# Patient Record
Sex: FEMALE | Race: BLACK OR AFRICAN AMERICAN | NOT HISPANIC OR LATINO | Employment: OTHER | URBAN - METROPOLITAN AREA
[De-identification: names, ages, dates, MRNs, and addresses within clinical notes are randomized per-mention and may not be internally consistent; named-entity substitution may affect disease eponyms.]

---

## 2023-05-17 ENCOUNTER — HOSPITAL ENCOUNTER (OUTPATIENT)
Dept: DATA CONVERSION | Facility: HOSPITAL | Age: 53
End: 2023-05-17
Attending: HOSPITALIST | Admitting: HOSPITALIST
Payer: COMMERCIAL

## 2023-05-17 DIAGNOSIS — Z99.2 DEPENDENCE ON RENAL DIALYSIS (CMS-HCC): ICD-10-CM

## 2023-05-17 DIAGNOSIS — I12.0 HYPERTENSIVE CHRONIC KIDNEY DISEASE WITH STAGE 5 CHRONIC KIDNEY DISEASE OR END STAGE RENAL DISEASE (MULTI): ICD-10-CM

## 2023-05-17 DIAGNOSIS — N18.6 END STAGE RENAL DISEASE (MULTI): ICD-10-CM

## 2023-05-17 DIAGNOSIS — I25.2 OLD MYOCARDIAL INFARCTION: ICD-10-CM

## 2023-05-17 DIAGNOSIS — Z01.810 ENCOUNTER FOR PREPROCEDURAL CARDIOVASCULAR EXAMINATION: ICD-10-CM

## 2023-05-17 DIAGNOSIS — E11.22 TYPE 2 DIABETES MELLITUS WITH DIABETIC CHRONIC KIDNEY DISEASE (MULTI): ICD-10-CM

## 2023-05-17 DIAGNOSIS — I25.10 ATHEROSCLEROTIC HEART DISEASE OF NATIVE CORONARY ARTERY WITHOUT ANGINA PECTORIS: ICD-10-CM

## 2023-05-17 DIAGNOSIS — Z95.5 PRESENCE OF CORONARY ANGIOPLASTY IMPLANT AND GRAFT: ICD-10-CM

## 2023-05-17 DIAGNOSIS — Z01.818 ENCOUNTER FOR OTHER PREPROCEDURAL EXAMINATION: ICD-10-CM

## 2023-05-17 DIAGNOSIS — Z94.0 KIDNEY TRANSPLANT STATUS (HHS-HCC): ICD-10-CM

## 2023-05-17 DIAGNOSIS — E78.5 HYPERLIPIDEMIA, UNSPECIFIED: ICD-10-CM

## 2023-05-17 DIAGNOSIS — Z86.718 PERSONAL HISTORY OF OTHER VENOUS THROMBOSIS AND EMBOLISM: ICD-10-CM

## 2023-05-17 LAB
ANION GAP IN SER/PLAS: 16 MMOL/L (ref 10–20)
CALCIUM (MG/DL) IN SER/PLAS: 10.4 MG/DL (ref 8.6–10.3)
CARBON DIOXIDE, TOTAL (MMOL/L) IN SER/PLAS: 29 MMOL/L (ref 21–32)
CHLORIDE (MMOL/L) IN SER/PLAS: 89 MMOL/L (ref 98–107)
CREATININE (MG/DL) IN SER/PLAS: 10.1 MG/DL (ref 0.5–1.05)
ERYTHROCYTE DISTRIBUTION WIDTH (RATIO) BY AUTOMATED COUNT: 14.4 % (ref 11.5–14.5)
ERYTHROCYTE MEAN CORPUSCULAR HEMOGLOBIN CONCENTRATION (G/DL) BY AUTOMATED: 35.2 G/DL (ref 32–36)
ERYTHROCYTE MEAN CORPUSCULAR VOLUME (FL) BY AUTOMATED COUNT: 88 FL (ref 80–100)
ERYTHROCYTES (10*6/UL) IN BLOOD BY AUTOMATED COUNT: 4.01 X10E12/L (ref 4–5.2)
GFR FEMALE: 4 ML/MIN/1.73M2
GLUCOSE (MG/DL) IN SER/PLAS: 181 MG/DL (ref 74–99)
HEMATOCRIT (%) IN BLOOD BY AUTOMATED COUNT: 35.2 % (ref 36–46)
HEMOGLOBIN (G/DL) IN BLOOD: 12.4 G/DL (ref 12–16)
LEUKOCYTES (10*3/UL) IN BLOOD BY AUTOMATED COUNT: 9.7 X10E9/L (ref 4.4–11.3)
NRBC (PER 100 WBCS) BY AUTOMATED COUNT: 0.2 /100 WBC (ref 0–0)
PLATELETS (10*3/UL) IN BLOOD AUTOMATED COUNT: 214 X10E9/L (ref 150–450)
POTASSIUM (MMOL/L) IN SER/PLAS: 4.3 MMOL/L (ref 3.5–5.3)
SODIUM (MMOL/L) IN SER/PLAS: 130 MMOL/L (ref 136–145)
UREA NITROGEN (MG/DL) IN SER/PLAS: 37 MG/DL (ref 6–23)

## 2023-06-01 LAB
FLOW AUTOCROSSMATCH: NORMAL
HLA CLASS I ANTIBODY SCREEN, FLOW CYTOMETRY: NORMAL

## 2023-10-02 DIAGNOSIS — E78.5 HYPERLIPIDEMIA, UNSPECIFIED HYPERLIPIDEMIA TYPE: ICD-10-CM

## 2023-10-02 RX ORDER — ATORVASTATIN CALCIUM 40 MG/1
40 TABLET, FILM COATED ORAL NIGHTLY
COMMUNITY
Start: 2023-06-26 | End: 2023-10-03 | Stop reason: ENTERED-IN-ERROR

## 2023-10-03 RX ORDER — ATORVASTATIN CALCIUM 40 MG/1
40 TABLET, FILM COATED ORAL NIGHTLY
Qty: 90 TABLET | Refills: 0 | Status: SHIPPED | OUTPATIENT
Start: 2023-10-03 | End: 2024-02-16

## 2023-10-12 PROCEDURE — 86808 CYTOTOXIC ANTIBODY SCREENING: CPT | Mod: OUT | Performed by: SURGERY

## 2023-10-26 ENCOUNTER — HOSPITAL ENCOUNTER (EMERGENCY)
Facility: HOSPITAL | Age: 53
Discharge: HOME | End: 2023-10-26
Attending: EMERGENCY MEDICINE
Payer: COMMERCIAL

## 2023-10-26 VITALS
HEIGHT: 67 IN | DIASTOLIC BLOOD PRESSURE: 73 MMHG | WEIGHT: 187.39 LBS | OXYGEN SATURATION: 96 % | BODY MASS INDEX: 29.41 KG/M2 | SYSTOLIC BLOOD PRESSURE: 126 MMHG | HEART RATE: 87 BPM | RESPIRATION RATE: 18 BRPM | TEMPERATURE: 97 F

## 2023-10-26 DIAGNOSIS — L02.91 ABSCESS: Primary | ICD-10-CM

## 2023-10-26 PROCEDURE — 99283 EMERGENCY DEPT VISIT LOW MDM: CPT | Mod: 25 | Performed by: EMERGENCY MEDICINE

## 2023-10-26 PROCEDURE — 76882 US LMTD JT/FCL EVL NVASC XTR: CPT | Performed by: EMERGENCY MEDICINE

## 2023-10-26 RX ORDER — DOXYCYCLINE 100 MG/1
100 TABLET ORAL 2 TIMES DAILY
Qty: 14 TABLET | Refills: 0 | Status: SHIPPED | OUTPATIENT
Start: 2023-10-26 | End: 2023-11-02

## 2023-10-26 ASSESSMENT — COLUMBIA-SUICIDE SEVERITY RATING SCALE - C-SSRS
6. HAVE YOU EVER DONE ANYTHING, STARTED TO DO ANYTHING, OR PREPARED TO DO ANYTHING TO END YOUR LIFE?: NO
2. HAVE YOU ACTUALLY HAD ANY THOUGHTS OF KILLING YOURSELF?: NO
1. IN THE PAST MONTH, HAVE YOU WISHED YOU WERE DEAD OR WISHED YOU COULD GO TO SLEEP AND NOT WAKE UP?: NO

## 2023-10-26 NOTE — ED PROVIDER NOTES
HPI   Chief Complaint   Patient presents with    Abscess     Abscess to left side of face/chin since Friday morning that has become increasingly more swollen.       HPI    HISTORY OF PRESENT ILLNESS:  52 y.o. female presenting to the ED with complaint of an abscess on the left cheek.  Has been present for about 1 week.  Has been increasing in size.  She tried to get appointment with her dermatologist, but was unable to schedule one until November.  She states it is mildly tender, but has increased in size, which prompted her ED visit today.  She denies any bleeding or drainage.  She denies any intraoral extension, and is unable to feel the abscess from the inside of the mouth.  No pain in the upper cheek, underside of the jaw, within the mouth, or the neck.  She has had no fevers or chills.  No neck pain or stiffness.  No dizziness or lightheadedness.  No headaches.  No blurry vision.  No other complaints     PMH: DM2, ESRD on HD  Family history: noncontributory  Social history: non smoker, no ETOH, no illicit substances                  No data recorded                Patient History   Past Medical History:   Diagnosis Date    Allergic purpura (CMS/HCC)     HSP (Henoch-Schonlein purpura) nephritis    Benign neoplasm of connective and other soft tissue, unspecified     Fibroid tumor    Compression of vein 11/16/2022    SVC syndrome    End stage renal disease (CMS/HCC)     ESRD (end stage renal disease)    Other conditions influencing health status     Acute Myocardial Infarction    Streptococcal meningitis     Streptococcal meningitis     Past Surgical History:   Procedure Laterality Date    HYSTEROSCOPY  05/15/2013    Hysteroscopy With Endometrial Ablation    OTHER SURGICAL HISTORY  05/15/2013    Renal Transplant    OTHER SURGICAL HISTORY  05/15/2013    Parathyroid Resection Sub-Total Parathyroidectomy    OTHER SURGICAL HISTORY  05/15/2013    Parathyroid Surgery    OTHER SURGICAL HISTORY  05/15/2013    Repair Of  Brow Ptosis Right Upper Eyelid    OTHER SURGICAL HISTORY  05/15/2013    Cardiac Cath Lesion 1, 1st Adjunct Treat Device: Stent    OTHER SURGICAL HISTORY  05/15/2013    Biopsy Renal    OTHER SURGICAL HISTORY  10/25/2018    Hemodialysis Access Type Arteriovenous Graft Right Thigh    OTHER SURGICAL HISTORY  10/25/2018    Hemodialysis Access Type Arteriovenous Fistula Left Arm    OTHER SURGICAL HISTORY  10/22/2014    Percutaneous Transluminal Angioplasty (Non-Coronary)     No family history on file.  Social History     Tobacco Use    Smoking status: Not on file    Smokeless tobacco: Not on file   Substance Use Topics    Alcohol use: Not on file    Drug use: Not on file       Physical Exam   ED Triage Vitals [10/26/23 1412]   Temp Heart Rate Resp BP   36.1 °C (97 °F) 87 18 126/73      SpO2 Temp src Heart Rate Source Patient Position   96 % -- -- --      BP Location FiO2 (%)     -- --       Physical Exam  Constitutional:       General: She is not in acute distress.     Appearance: She is not toxic-appearing.   HENT:      Head: Normocephalic and atraumatic.      Mouth/Throat:      Mouth: Mucous membranes are moist.   Eyes:      General: No scleral icterus.     Extraocular Movements: Extraocular movements intact.      Conjunctiva/sclera: Conjunctivae normal.      Pupils: Pupils are equal, round, and reactive to light.   Cardiovascular:      Rate and Rhythm: Normal rate and regular rhythm.      Pulses: Normal pulses.   Pulmonary:      Effort: Pulmonary effort is normal. No respiratory distress.   Abdominal:      Palpations: Abdomen is soft.   Musculoskeletal:         General: Normal range of motion.      Cervical back: Normal range of motion and neck supple. No rigidity.   Skin:     General: Skin is warm and dry.      Capillary Refill: Capillary refill takes less than 2 seconds.      Comments: +2cm focal area of soft edema on the mid to lower left cheek, there is a punctate head of purulence in the center, is not indurated,  soft but not overtly fluctuant, mobile, does not extend into the submandibular area of the neck, the upper cheek, or inside the mouth.  There are no lesions within the mouth, teeth are intact, no evidence of dental infection.  Full range of motion of the neck.  No tenderness or swelling over the anterior neck.  Normal phonation.   Neurological:      General: No focal deficit present.      Mental Status: She is alert and oriented to person, place, and time.      Cranial Nerves: No cranial nerve deficit.      Gait: Gait normal.   Psychiatric:         Mood and Affect: Mood normal.         Behavior: Behavior normal.         Judgment: Judgment normal.       ED Course & MDM   Diagnoses as of 10/26/23 1518   Abscess       Medical Decision Making    ED course / MDM     Summary:  Patient presented with an abscess on the left cheek which has been present for 1 week.  Not overtly painful.  Vital signs are stable, patient appears nontoxic.  She has no fevers or chills, no systemic symptoms.  On exam, there is a soft area of edema over the left cheek that is mobile, does not extend into the oral cavity, below the angle of the jaw, or into the superior cheek.  Not indurated, is soft but not overtly fluctuant.  There is a pustular head in the center. Patient case discussed with ED attending Dr. Chacon, who also saw and evaluated the patient, and completed a point-of-care ultrasound, which appears more consistent with a cyst or potentially a budding abscess. Results and differential were discussed in detail with the patient.  Discussed management options including antibiotic treatment versus incision and drainage, discussed that we would like to avoid an incision and drainage of the face if possible, and as the ultrasound did not reveal an obvious abscess and exam is not obviously reflective of an abscess, patient is in agreement the plan for antibiotic treatment with outpatient follow-up.  Prescription written for doxycycline.  Patient was given strict return precautions, understands reasons to return to the ED including signs of worsening infection. Also discussed supportive care instructions. I expressed the importance of outpatient follow up with their PCP.  She does have an appointment with dermatology next week.  All questions were answered, patient expressed understanding and stated that they would comply.    Patient was advised to follow up with PCP or recommended provider in 2-3 days for another evaluation and exam. I advised patient and family/friend/caregiver/guardian to return or go to closest emergency room immediately if symptoms change, get worse, new symptoms develop prior to follow up. If there is no improvement in symptoms in the next 24 hours they are advised to return for further evaluation and exam. I also explained the plan and treatment course. Patient and family/friend/caregiver/guardian is in agreement with plan, treatment course, and follow up and states verbally that they will comply.    Impression:  1. See diagnosis    Plan: Homegoing. I discussed the differential, results, and discharge plan with the patient and family/friend/caregiver. I emphasized the importance of follow-up with the physician I referred them to in the timeframe recommended.  I explained reasons for the patient to return to the Emergency Department. They agreed that if they feel their condition is worsening or if they have any other concern they should call 911 immediately for further assistance. We also discussed medications that were prescribed including common side effects and interactions. The patient was advised to abstain from driving, operating heavy machinery, or making significant decisions while taking medications such as opiates and muscle relaxers that may impair this. I gave the patient an opportunity to ask all questions they had and answered all of them accordingly. They understand return precautions and discharge instructions.  The patient and family/friend/caregiver expressed understanding verbally and that they would comply.       Disposition: Discharge    Patient seen and discussed with Dr. Chacon    This note has been transcribed using voice recognition and may contain grammatical errors, misplaced words, incorrect words, incorrect phrases or other errors.     Procedure  Procedures     Divine Cuellar PA-C  10/26/23 1855

## 2023-10-26 NOTE — ED PROCEDURE NOTE
Procedure    Performed by: Michael Chacon MD  Authorized by: Michael Chacon MD          Integumentary Indications: fluctuance and swelling        Procedure: Soft Tissue Ultrasound    Findings:  Fluid Collection: A FLUID COLLECTION was identified.        Impression:  Soft Tissue: The soft tissue ultrasound exam had ABNORMAL findings as specified. and superficial cystic hypoechoic structure approximately 1 cm x 0.5 cm without internal Doppler signal                   Michael Chacon MD  10/26/23 1500

## 2023-10-31 ENCOUNTER — LAB REQUISITION (OUTPATIENT)
Dept: LAB | Facility: CLINIC | Age: 53
End: 2023-10-31
Payer: COMMERCIAL

## 2023-10-31 DIAGNOSIS — N18.6 END STAGE RENAL DISEASE (MULTI): ICD-10-CM

## 2023-10-31 LAB — FREEZE CROSSMATCH: NORMAL

## 2023-12-05 DIAGNOSIS — Z01.810 ENCOUNTER FOR PREPROCEDURAL CARDIOVASCULAR EXAMINATION: ICD-10-CM

## 2023-12-05 DIAGNOSIS — Z01.818 PRE-TRANSPLANT EVALUATION FOR KIDNEY TRANSPLANT: Primary | ICD-10-CM

## 2023-12-05 RX ORDER — REGADENOSON 0.08 MG/ML
0.4 INJECTION, SOLUTION INTRAVENOUS
Status: CANCELLED | OUTPATIENT
Start: 2023-12-05

## 2023-12-11 ENCOUNTER — OTHER (OUTPATIENT)
Dept: TRANSPLANT | Facility: HOSPITAL | Age: 53
End: 2023-12-11
Payer: COMMERCIAL

## 2023-12-12 PROCEDURE — 86833 HLA CLASS II HIGH DEFIN QUAL: CPT | Mod: OUT | Performed by: SURGERY

## 2023-12-12 PROCEDURE — 86832 HLA CLASS I HIGH DEFIN QUAL: CPT | Mod: OUT | Performed by: SURGERY

## 2023-12-19 ENCOUNTER — LAB REQUISITION (OUTPATIENT)
Dept: LAB | Facility: CLINIC | Age: 53
End: 2023-12-19
Payer: COMMERCIAL

## 2023-12-19 DIAGNOSIS — N18.6 END STAGE RENAL DISEASE (MULTI): ICD-10-CM

## 2023-12-19 LAB
HLA RESULTS: NORMAL
HLA-A+B+C AB NFR SER: NORMAL %
HLA-DP+DQ+DR AB NFR SER: NORMAL %

## 2023-12-21 ENCOUNTER — APPOINTMENT (OUTPATIENT)
Dept: CARDIOLOGY | Facility: CLINIC | Age: 53
End: 2023-12-21
Payer: COMMERCIAL

## 2023-12-22 ENCOUNTER — APPOINTMENT (OUTPATIENT)
Dept: RADIOLOGY | Facility: CLINIC | Age: 53
End: 2023-12-22
Payer: COMMERCIAL

## 2023-12-22 ENCOUNTER — APPOINTMENT (OUTPATIENT)
Dept: CARDIOLOGY | Facility: CLINIC | Age: 53
End: 2023-12-22
Payer: COMMERCIAL

## 2023-12-27 ENCOUNTER — APPOINTMENT (OUTPATIENT)
Dept: TRANSPLANT | Facility: HOSPITAL | Age: 53
End: 2023-12-27
Payer: COMMERCIAL

## 2023-12-27 ENCOUNTER — LAB REQUISITION (OUTPATIENT)
Dept: LAB | Facility: CLINIC | Age: 53
End: 2023-12-27
Payer: COMMERCIAL

## 2023-12-27 DIAGNOSIS — N18.6 END STAGE RENAL DISEASE (MULTI): ICD-10-CM

## 2023-12-29 ENCOUNTER — APPOINTMENT (OUTPATIENT)
Dept: TRANSPLANT | Facility: HOSPITAL | Age: 53
End: 2023-12-29
Payer: COMMERCIAL

## 2023-12-29 ENCOUNTER — APPOINTMENT (OUTPATIENT)
Dept: CARDIOLOGY | Facility: HOSPITAL | Age: 53
End: 2023-12-29
Payer: COMMERCIAL

## 2024-01-09 PROCEDURE — 86808 CYTOTOXIC ANTIBODY SCREENING: CPT | Mod: OUT | Performed by: SURGERY

## 2024-01-10 PROBLEM — R42 DIZZINESS: Status: ACTIVE | Noted: 2024-01-10

## 2024-01-10 PROBLEM — E66.9 OBESITY, CLASS I, BMI 30-34.9: Status: ACTIVE | Noted: 2019-04-24

## 2024-01-10 PROBLEM — I89.0 LYMPHEDEMA: Status: ACTIVE | Noted: 2024-01-10

## 2024-01-10 PROBLEM — E87.5 HYPERKALEMIA: Status: ACTIVE | Noted: 2023-11-10

## 2024-01-10 PROBLEM — E11.3593 PROLIFERATIVE DIABETIC RETINOPATHY OF BOTH EYES WITHOUT MACULAR EDEMA ASSOCIATED WITH TYPE 2 DIABETES MELLITUS (MULTI): Status: ACTIVE | Noted: 2020-01-29

## 2024-01-10 PROBLEM — S06.5XAA SDH (SUBDURAL HEMATOMA) (MULTI): Status: ACTIVE | Noted: 2023-07-02

## 2024-01-10 PROBLEM — T82.590A DIALYSIS AV FISTULA MALFUNCTION (CMS-HCC): Status: ACTIVE | Noted: 2021-11-11

## 2024-01-10 PROBLEM — R60.9 SWELLING: Status: ACTIVE | Noted: 2017-04-12

## 2024-01-10 PROBLEM — I10 BENIGN ESSENTIAL HYPERTENSION: Status: ACTIVE | Noted: 2024-01-10

## 2024-01-10 PROBLEM — I25.10 CORONARY ARTERY STENOSIS: Status: ACTIVE | Noted: 2024-01-10

## 2024-01-10 PROBLEM — E66.811 OBESITY, CLASS I, BMI 30-34.9: Status: ACTIVE | Noted: 2019-04-24

## 2024-01-10 PROBLEM — E21.3 HYPERPARATHYROIDISM (MULTI): Status: ACTIVE | Noted: 2024-01-10

## 2024-01-10 PROBLEM — T82.41XA: Status: ACTIVE | Noted: 2023-11-05

## 2024-01-10 PROBLEM — D69.0: Status: ACTIVE | Noted: 2023-09-01

## 2024-01-10 PROBLEM — I95.9 HYPOTENSION: Status: ACTIVE | Noted: 2019-05-24

## 2024-01-10 PROBLEM — E78.5 HYPERLIPIDEMIA: Status: ACTIVE | Noted: 2024-01-10

## 2024-01-10 PROBLEM — M79.89 SWELLING, LIMB: Status: ACTIVE | Noted: 2019-02-13

## 2024-01-10 PROBLEM — L67.8 ABNORMAL FACIAL HAIR: Status: ACTIVE | Noted: 2024-01-10

## 2024-01-10 PROBLEM — I10 HTN (HYPERTENSION): Status: ACTIVE | Noted: 2024-01-10

## 2024-01-10 PROBLEM — N80.03 ADENOMYOSIS: Status: ACTIVE | Noted: 2024-01-10

## 2024-01-10 PROBLEM — N94.6 DYSMENORRHEA: Status: ACTIVE | Noted: 2024-01-10

## 2024-01-10 PROBLEM — I87.1 SUPERIOR VENA CAVA COMPRESSION SYNDROME: Status: ACTIVE | Noted: 2017-03-01

## 2024-01-10 PROBLEM — Z94.0 KIDNEY REPLACED BY TRANSPLANT (HHS-HCC): Status: ACTIVE | Noted: 2024-01-10

## 2024-01-10 PROBLEM — E11.9 CONTROLLED TYPE 2 DIABETES MELLITUS WITHOUT COMPLICATION, WITHOUT LONG-TERM CURRENT USE OF INSULIN (MULTI): Status: ACTIVE | Noted: 2019-04-24

## 2024-01-10 PROBLEM — E83.51 HYPOCALCEMIA: Status: ACTIVE | Noted: 2024-01-10

## 2024-01-10 PROBLEM — I77.0 AVF (ARTERIOVENOUS FISTULA) (CMS-HCC): Status: ACTIVE | Noted: 2022-08-03

## 2024-01-10 PROBLEM — R87.811 VAGINAL HIGH RISK HUMAN PAPILLOMAVIRUS (HPV) DNA TEST POSITIVE: Status: ACTIVE | Noted: 2020-11-26

## 2024-01-10 PROBLEM — G47.33 OBSTRUCTIVE SLEEP APNEA: Status: ACTIVE | Noted: 2024-01-10

## 2024-01-10 PROBLEM — R11.10 VOMITING: Status: ACTIVE | Noted: 2024-01-10

## 2024-01-10 PROBLEM — T82.858A AV FISTULA STENOSIS (CMS-HCC): Status: ACTIVE | Noted: 2019-02-13

## 2024-01-10 PROBLEM — K21.9 GASTROESOPHAGEAL REFLUX DISEASE WITHOUT ESOPHAGITIS: Status: ACTIVE | Noted: 2019-04-24

## 2024-01-10 PROBLEM — Z99.2: Status: ACTIVE | Noted: 2024-01-10

## 2024-01-10 PROBLEM — I82.90 VENOUS THROMBOSIS: Status: ACTIVE | Noted: 2024-01-10

## 2024-01-10 PROBLEM — R25.2 BILATERAL LEG CRAMPS: Status: ACTIVE | Noted: 2024-01-10

## 2024-01-10 PROBLEM — N08: Status: ACTIVE | Noted: 2023-09-01

## 2024-01-10 PROBLEM — F32.A DEPRESSION: Status: ACTIVE | Noted: 2024-01-10

## 2024-01-10 PROBLEM — N18.6 ESRD (END STAGE RENAL DISEASE) (MULTI): Status: ACTIVE | Noted: 2017-12-15

## 2024-01-10 PROBLEM — I73.9 PERIPHERAL VASCULAR DISEASE (CMS-HCC): Status: ACTIVE | Noted: 2024-01-10

## 2024-01-10 RX ORDER — HYDROQUINONE 40 MG/G
CREAM TOPICAL
COMMUNITY
Start: 2023-05-14 | End: 2024-02-05 | Stop reason: ALTCHOICE

## 2024-01-10 RX ORDER — DIPHENHYDRAMINE HCL 25 MG
TABLET ORAL NIGHTLY PRN
COMMUNITY
Start: 2018-10-25

## 2024-01-10 RX ORDER — TRIAMCINOLONE ACETONIDE 1 MG/G
OINTMENT TOPICAL
COMMUNITY
Start: 2023-10-19 | End: 2024-02-05 | Stop reason: ALTCHOICE

## 2024-01-10 RX ORDER — OMEPRAZOLE 40 MG/1
40 CAPSULE, DELAYED RELEASE ORAL 2 TIMES DAILY
COMMUNITY
Start: 2023-12-20

## 2024-01-10 RX ORDER — PREGABALIN 25 MG/1
25 CAPSULE ORAL DAILY
COMMUNITY
Start: 2023-10-11 | End: 2024-02-05 | Stop reason: ALTCHOICE

## 2024-01-10 RX ORDER — MINERAL/VITAMIN SUPPLEMENT 66; 40; 20; 10; 10; 1.7; 1.5; 1; .3; .006 MG/1; MG/1; MG/1; MG/1; MG/1; MG/1; MG/1; MG/1; MG/1; MG/1
1 TABLET, COATED ORAL DAILY
COMMUNITY
Start: 2023-09-30

## 2024-01-10 RX ORDER — SEVELAMER CARBONATE 800 MG/1
1600 TABLET, FILM COATED ORAL
COMMUNITY
Start: 2023-11-27

## 2024-01-10 RX ORDER — CETIRIZINE HYDROCHLORIDE 5 MG/1
5 TABLET ORAL
COMMUNITY
Start: 2023-11-14

## 2024-01-10 RX ORDER — CALCITRIOL 0.5 UG/1
CAPSULE ORAL
COMMUNITY

## 2024-01-10 RX ORDER — ACETAMINOPHEN 325 MG/1
650 TABLET ORAL EVERY 4 HOURS PRN
COMMUNITY
Start: 2021-11-13

## 2024-01-10 RX ORDER — GEMFIBROZIL 600 MG/1
600 TABLET, FILM COATED ORAL 2 TIMES DAILY
COMMUNITY
Start: 2003-03-18 | End: 2024-02-05 | Stop reason: ALTCHOICE

## 2024-01-10 RX ORDER — SODIUM BICARBONATE 325 MG/1
TABLET ORAL
COMMUNITY
Start: 2003-03-18

## 2024-01-10 RX ORDER — CLOBETASOL PROPIONATE 0.5 MG/G
OINTMENT TOPICAL
COMMUNITY
Start: 2023-06-21

## 2024-01-10 RX ORDER — ONDANSETRON 4 MG/1
TABLET, FILM COATED ORAL EVERY 8 HOURS PRN
COMMUNITY
Start: 2018-10-25

## 2024-01-10 RX ORDER — MULTIVITAMIN
1 TABLET ORAL DAILY
COMMUNITY

## 2024-01-10 RX ORDER — KETOCONAZOLE 20 MG/ML
SHAMPOO, SUSPENSION TOPICAL
COMMUNITY
Start: 2023-07-23

## 2024-01-10 RX ORDER — PREDNISONE 2.5 MG/1
TABLET ORAL
COMMUNITY
Start: 2003-02-22 | End: 2024-02-05 | Stop reason: ALTCHOICE

## 2024-01-10 RX ORDER — METOPROLOL SUCCINATE 25 MG/1
25 TABLET, EXTENDED RELEASE ORAL DAILY
COMMUNITY
Start: 2023-10-11 | End: 2024-02-21 | Stop reason: SDUPTHER

## 2024-01-10 RX ORDER — GLIPIZIDE 5 MG/1
10 TABLET ORAL 2 TIMES DAILY
COMMUNITY
Start: 2023-07-02

## 2024-01-10 RX ORDER — DOCUSATE SODIUM 100 MG/1
100 CAPSULE, LIQUID FILLED ORAL 2 TIMES DAILY
COMMUNITY
Start: 2023-07-04

## 2024-01-11 ENCOUNTER — APPOINTMENT (OUTPATIENT)
Dept: CARDIOLOGY | Facility: CLINIC | Age: 54
End: 2024-01-11
Payer: COMMERCIAL

## 2024-01-11 NOTE — PROGRESS NOTES
Location of visit: [address]   Type of Visit: New    Chief Complaint  Patient was referred to Cardiology by transplant Team for pretransplant evaluation.    History Of Present Illness:    Rody Espinosa is a 53 y.o. female, with history significant for ***, who visits Cardiology today as a new patient  for ***.    Patient denies chest pain, dyspnea on exertion, shortness of breath, orthopnea, PND, nocturia, edema, palpitations, dizziness, lightheadedness, syncope, or claudication.    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    Past Surgical History:  She has a past surgical history that includes Other surgical history (05/15/2013); Other surgical history (05/15/2013); Other surgical history (05/15/2013); Hysteroscopy (05/15/2013); Other surgical history (05/15/2013); Other surgical history (05/15/2013); Other surgical history (05/15/2013); Other surgical history (10/25/2018); Other surgical history (10/25/2018); and Other surgical history (10/22/2014).    Social History:  She has no history on file for tobacco use, alcohol use, and drug use.    No family history on file.  Allergies:  Betadine [povidone-iodine], Adhesive tape-silicones, and Iodine    Outpatient Medications:  Current Outpatient Medications   Medication Instructions    acetaminophen (TYLENOL) 650 mg, oral, Every 4 hours PRN    atorvastatin (LIPITOR) 40 mg, oral, Nightly    calcitriol (Rocaltrol) 0.5 mcg capsule oral    cetirizine (ZYRTEC) 5 mg, oral, Daily RT    clobetasol (Temovate) 0.05 % ointment Apply to itchy spot on back twice daily five days a week    diphenhydrAMINE (Benadryl Allergy) 25 mg  "tablet oral, Nightly PRN    docusate sodium (COLACE) 100 mg, oral, 2 times daily    gemfibrozil (LOPID) 600 mg, oral, 2 times daily    glipiZIDE (GLUCOTROL) 10 mg, oral, 2 times daily    hydroquinone 4 % cream apply to dark marks twice a day    ketoconazole (NIZOral) 2 % shampoo WASH SCALP ONCE EVERY OTHER WEEK  LEAVE ON FOR 15 MINUTES THEN RINSE    metoprolol succinate XL (TOPROL-XL) 25 mg, oral, Daily    multivitamin tablet 1 tablet, oral, Daily    Nephron FA 66 mg iron- 1,000 mcg tablet 1 tablet, oral, Daily    omeprazole (PRILOSEC) 40 mg, oral, 2 times daily    ondansetron (Zofran) 4 mg tablet oral, Every 8 hours PRN    predniSONE (Deltasone) 2.5 mg tablet 5mg po one day 2.5 mg other day    pregabalin (LYRICA) 25 mg, oral, Daily    sevelamer carbonate (RENVELA) 1,600 mg, oral, 3 times daily with meals    sodium bicarbonate 325 mg tablet 3 tablet po daily    triamcinolone (Kenalog) 0.1 % ointment apply to scalp every other day     Last Recorded Vitals:  There were no vitals filed for this visit.  Physical Exam:      10/26/2023     2:12 PM 8/21/2023    11:03 AM 3/13/2023    10:44 AM 11/23/2022     1:18 PM 11/16/2022     8:13 AM 11/10/2021    10:35 AM   Vitals   Systolic 126 143 116 121 109 110   Diastolic 73 79 73 74 65 69   Heart Rate 87 86 89 96 90 88   Temp 36.1 °C (97 °F)    36.4 °C (97.6 °F) 36.3 °C (97.3 °F)   Resp 18    18    Height (in) 1.689 m (5' 6.5\") 1.676 m (5' 6\") 1.676 m (5' 6\") 1.676 m (5' 6\") 1.715 m (5' 7.5\") 1.702 m (5' 7\")   Weight (lb) 187.39 189.06 192.5 192 188 192.2   BMI 29.79 kg/m2 30.52 kg/m2 31.07 kg/m2 30.99 kg/m2 29.01 kg/m2 30.1 kg/m2   BSA (m2) 2 m2 2 m2 2.02 m2 2.01 m2 2.02 m2 2.03 m2     Wt Readings from Last 5 Encounters:   10/26/23 85 kg (187 lb 6.3 oz)   08/21/23 85.8 kg (189 lb 1 oz)   03/13/23 87.3 kg (192 lb 8 oz)   11/23/22 87.1 kg (192 lb)   11/16/22 85.3 kg (188 lb)     General: Sitting up comfortably in chair; in no apparent distress.  HEENT: Normocephalic; atraumatic. " "Well hydrated.  Eyes: Anicteric sclera. Extraocular movement intact.  Neck: Supple; no thyromegaly; normal jugular venous pressure, no bruits.  Respiratory: Bilateral air entry equal. No wheezing.  Cardiovascular: Normal S1, S2; no murmurs auscultated.  Abdomen: Nondistended; nontender. (+) bowel sounds.  Extremities: No peripheral edema present. Pulses 2+ diffusely.  Neurological: Oriented to time, place, and person; nonfocal.  Psychiatric: Normal affect.     Last Labs Reviewed:  CBC -  Recent Labs     05/17/23  1205 04/04/23  1136 11/16/22  1240   WBC 9.7 7.7 7.2   HGB 12.4 12.9 14.3   HCT 35.2* 36.8 39.7    167 171   MCV 88 88 91     CMP -  Recent Labs     05/17/23  1205 04/04/23  1136 11/16/22  1240   * 135* 136   K 4.3 3.8 3.7   CL 89* 91* 93*   CO2 29 26 29   ANIONGAP 16 22* 18   BUN 37* 42* 31*   CREATININE 10.10* 12.43* 11.29*     Recent Labs     11/16/22  1240   ALBUMIN 4.3   ALKPHOS 72   ALT 20   AST 16   BILITOT 0.9     LIPID PANEL -   No results for input(s): \"CHOL\", \"LDLF\", \"LDLCALC\", \"HDL\", \"TRIG\" in the last 88722 hours.  COAGULATION PANEL -  Recent Labs     04/04/23  1136   INR 1.0     HEME/ENDO -  Recent Labs     11/16/22  1240 03/15/22  0902 12/30/20  1115 11/11/20  1531   HGBA1C 5.8* 6.4* 5.4 5.2      CARDIOVASCULAR  No results for input(s): \"LDH\", \"CKMB\", \"TROPHS\", \"BNP\", \"CRP\" in the last 37752 hours.    No lab exists for component: \"CK\", \"CKMBP\", \"CRPUS\", \"USCRP\"  Last Cardiology/Imaging Tests Personally Reviewed (if images available) and Interpreted:  ECG:  No results found for this or any previous visit (from the past 4464 hour(s)).No results found for this or any previous visit from the past 1095 days.    Echocardiogram:  ECHOCARDIOGRAM     Narrative  Ordered by an unspecified provider.  No results found for this or any previous visit from the past 1095 days.    Cath:  Adult Cath     Narrative  [facility], Cath Lab, [address] [place] [address] " [id number]    Cardiovascular Catheterization Report    Patient Name:     FERNANDO HERNANDEZ Performing Physician:  [id number] Jacobo Casanova MD  Study Date:       5/17/2023          Verifying Physician:   [id number] Jacobo Casanova MD  MRN/PID:          [id number]           Cardiologist/Co-scrub:  Accession/Order#: [id number]          Fellow:  YOB: 1970          Fellow:  Gender:           F                  Referring Physician:   JACOBO CASANOVA  Admit Date:                          Referring Physician:   X  Surgeon:                             Referring Physician:   [id number] Kenny Barrett MD      Study: Left Heart Catheterization      Indications:  FERNANDO HERNANDEZ is a 52 year old female who presents with diabetes, dyslipidemia, hypertension, prior percutaneous coronary intervention, renal failure and pre renal transplant evaluation. Cath indication - other, with an asymptomatic chest pain assessment. Study performed as an elective cath procedure.    Medical History:  Stress test performed: Yes. Stress test type: Stress Nuclear. Stress result: Negative. CTA performed: No. Agatston accessed: No. LVEF Assessed: Yes. LVEF = 75%.    Coronary Angiography:  The coronary circulation is left dominant.    Left Main Coronary Artery:  The left main coronary artery is a normal caliber vessel. The left main arises normally from the left coronary sinus of Valsalva and bifurcates into the LAD and circumflex coronary arteries. The left main coronary artery showed no significant disease or stenosis greater than 30%.    Left Anterior Descending Coronary Artery Distribution:  The left anterior descending coronary artery is a normal caliber vessel. The LAD arises normally from the left main coronary artery. The LAD demonstrated no significant disease or stenosis greater than 30%. The 1st diagonal branch showed no significant disease or stenosis greater than 30%. The 2nd diagonal branch demonstrated no significant disease or stenosis  greater than 30%.    Circumflex Coronary Artery Distribution:  The circumflex coronary artery is a normal caliber vessel. The circumflex arises normally from the left main coronary artery and terminates in the AV groove. The circumflex revealed no significant disease or stenosis greater than 30%. The 1st obtuse marginal branch showed no significant disease or stenosis greater than 30%. The 2nd obtuse marginal branch demonstrated no significant disease or stenosis greater than 30%. The left posterolateral branch showed no significant disease or stenosis greater than 30%. The left posterior descending artery showed no significant disease or stenosis greater than 30%.    Right Coronary Artery Distribution:    The right coronary artery is a normal caliber vessel. The RCA arises normally from the right sinus of Valsalva. The RCA showed no significant disease or stenosis greater than 30% and patent proximal-mid RCA stent.    Coronary Interventions:    Hemo Personnel:  +----------------------+-------------+  Name                  Duty           +----------------------+-------------+  Jacobo Casanova MD          PROC MD 1  +----------------------+-------------+  Magdalene Reyes RN    PROC CIRC 1  +----------------------+-------------+  Roxanna Gaines RN PROC RECORD 1  +----------------------+-------------+  Doug Skinner RN PROC NURSE 1  +----------------------+-------------+  Nicolasa Aguila RN       PROC NURSE 2  +----------------------+-------------+      Sedation Time:  +------------------------+----------------------------------------+  Sedation Start/End TimesTime                                      +------------------------+----------------------------------------+  Start                   5/17/2023 12:51:30                        +------------------------+----------------------------------------+  Drugs                   Fentanyl 25 mcg IV per physician for  sed  +------------------------+----------------------------------------+  End                     5/17/2023 13:09:11                        +------------------------+----------------------------------------+      Equipment Used:  +---------------------+--------------------------------------------------------+              Date/Time                                             Description  +---------------------+--------------------------------------------------------+  5/17/2023 12:40:29 PM   Omnipaque {Omnipaque} - Omnipaque 350 100 ml - Qty: 1                                                                Each Part #: 02  +---------------------+--------------------------------------------------------+  5/17/2023 12:40:31 PM  {6 Uzbek Sheaths} - 6 Uzbek 11 cm - Qty: 1 Each Part                                                                         #: 167  +---------------------+--------------------------------------------------------+  5/17/2023 12:40:36 PM  {Diagnostic Wires} - Versacor Straight Tip .035 mm 145                                                   cm - Qty: 1 Each Part #: 711  +---------------------+--------------------------------------------------------+  5/17/2023 12:44:44 PM    Terumo {Terumo} - Terumo TR band small - Qty: 1 Each                                                                   Part #: 8010  +---------------------+--------------------------------------------------------+  5/17/2023 12:44:56 PM{5 Uzbek Cath} - TIG 4.0 5Fr - Qty: 1 Each Part #: 8500  +---------------------+--------------------------------------------------------+      Fluoroscopy Time:  +--------------------------+--------+  X-Ray Summary Fluoro Time:3.80 min  +--------------------------+--------+      +----------+--------+  Contrast: Dose:     +----------+--------+  Omnipaque:25.00 ml  +----------+--------+      Hemodynamic  Pressures:    +----+--------------------+----------+-------------+--------------+---------+  Site     Date Time      Phase NameSystolic mmHgDiastolic mmHgMean mmHg  +----+--------------------+----------+-------------+--------------+---------+    AO5/17/2023 1:02:21 PM  AIR REST           64            45       52  +----+--------------------+----------+-------------+--------------+---------+      Complications:  No in-lab complications observed.    Cardiac Cath Transition of Care Summary:  Post Procedure Diagnosis: See below.  Blood Loss:               Estimated blood loss during the procedure was 5 mls.  Specimens Removed:        Number of specimen(s) removed: none.    ____________________________________________________________________________________  CONCLUSIONS:  1. Mild CAD with patent RCA stent in a left-dominant system.  2. FU with Dr. Barrett.    ____________________________________________________________________________________  CPT Codes:  Moderate Sedation Services initial 15 minutes patient >5 years-91272; Ultrasound guidance for needle placement-03250; Left Heart Cath (visualization of coronaries) and LV-01725    ICD 10 Codes:  Z01.810-Encounter for preprocedural cardiovascular examination    [id number] Jacobo Clifton MD  Performing Physician  Electronically signed by [id number] Jacobo Cilfton MD on 5/17/2023 at 1:42:46 PM      cc Report to: JACOBO CLIFTON    cc Report to: X    cc Report to: [id number] Kenny Barrett MD          *** Final ***  No results found for this or any previous visit from the past 3650 days.  No results found for this or any previous visit from the past 1095 days.    Stress Test:  NM cardiac stress rest (myocardial perfusion MIBI) 11/16/2022    Narrative  MRN: [id number]  Patient Name: FERNANDO HERNANDEZ    STUDY:  CARDIAC STRESS/REST INJECTION; PART 2 STRESS OR REST (NO CHARGE);  CARDIAC STRESS/REST (MYOCARDIAL PERFUSION/MIBI);  11/16/2022 12:03 pm    INDICATION:  listed for kidney  transplant  Z01.818: Pre-transplant evaluation for  kidney transplant.    COMPARISON:  11/17/2021.    ACCESSION NUMBER(S):  [id number]; [id number]; [id number]    ORDERING CLINICIAN:  SAUL SCHMID    TECHNIQUE:  DIVISION OF NUCLEAR MEDICINE  PHARMACOLOGIC STRESS MYOCARDIAL PERFUSION SCAN, ONE DAY PROTOCOL    The patient received an intravenous dose of  8.1 mCi of Tc-99m  Myoview and resting emission tomographic (SPECT) images of the  myocardium were acquired. The patient then received an intravenous  infusion of 0.4mg regadenoson (Lexiscan)  followed by an additional  dose of  25.5 mCi of Tc-99m  Myoview. Stress phase SPECT images of  the myocardium were then acquired. These included ECG-gated images to  assess and quantify ventricular function.  A low-dose, nondiagnostic  regional CT was utilized for attenuation correction purposes.    FINDINGS:  Both stress and rest studies demonstrate grossly normal perfusion  throughout the left ventricle.    The left ventricle is normal in size.    Gated images demonstrate normal LV wall motion with an LV EF  estimated at greater than 65% on the stress images.    Attenuation correction CT images demonstrate no gross anatomic  abnormalities.    Impression  1.  Normal myocardial perfusion study without evidence of inducible  ischemia or prior infarction. Findings similar to prior study.  2. The left ventricle is normal in size.  3. Normal LV wall motion with an LV EF estimated at greater than 65%.      I personally reviewed the images/study and I agree with the findings  as stated. This study was interpreted at [facility], [place], [place].  Nuclear Stress Test 11/16/2022    Cardiac CT/MRI:  No results found for this or any previous visit from the past 1825 days.  No results found for this or any previous visit from the past 1095 days.    Other CT:  No results found for this or any previous visit from the past 1825 days.  No results found for this or any  previous visit from the past 1825 days.    CV RISK FACTORS:   # Hypertension: Last BP:  .  # Hyperlipidemia: Last Tchol No results found for requested labs within last 365 days. / LDL No results found for requested labs within last 365 days. / HDL No results found for requested labs within last 365 days. / TRIG No results found for requested labs within last 365 days. (No results in last year.).  # Type II Diabetes Mellitus: Last A1c No results found for requested labs within last 365 days. (No results in last year.).  # Obesity: Last BMI:  .  # CKD: Last BUN/Cr (GFR): 37/10.10 (No results found for requested labs within last 365 days.), 5/17/2023: 12:05 PM.    ASCV RISK:  The ASCVD Risk score (Derrick DK, et al., 2019) failed to calculate for the following reasons:    The patient has a prior MI or stroke diagnosis    Assessment/Plan   ***    Recommendations:  - ***  - ***  - {Follow up results:89654}    Tomas Vuong MD

## 2024-01-19 ENCOUNTER — DOCUMENTATION (OUTPATIENT)
Dept: TRANSPLANT | Facility: HOSPITAL | Age: 54
End: 2024-01-19

## 2024-01-19 ENCOUNTER — OFFICE VISIT (OUTPATIENT)
Dept: TRANSPLANT | Facility: HOSPITAL | Age: 54
End: 2024-01-19
Payer: COMMERCIAL

## 2024-01-19 ENCOUNTER — SOCIAL WORK (OUTPATIENT)
Dept: TRANSPLANT | Facility: HOSPITAL | Age: 54
End: 2024-01-19
Payer: COMMERCIAL

## 2024-01-19 ENCOUNTER — HOSPITAL ENCOUNTER (OUTPATIENT)
Dept: CARDIOLOGY | Facility: HOSPITAL | Age: 54
Discharge: HOME | End: 2024-01-19
Payer: COMMERCIAL

## 2024-01-19 VITALS
HEART RATE: 89 BPM | BODY MASS INDEX: 30.54 KG/M2 | SYSTOLIC BLOOD PRESSURE: 140 MMHG | TEMPERATURE: 97.4 F | OXYGEN SATURATION: 95 % | DIASTOLIC BLOOD PRESSURE: 74 MMHG | WEIGHT: 192.1 LBS

## 2024-01-19 DIAGNOSIS — Z01.818 PRE-TRANSPLANT EVALUATION FOR KIDNEY TRANSPLANT: Primary | ICD-10-CM

## 2024-01-19 DIAGNOSIS — Z01.810 ENCOUNTER FOR PREPROCEDURAL CARDIOVASCULAR EXAMINATION: ICD-10-CM

## 2024-01-19 DIAGNOSIS — Z01.818 PRE-TRANSPLANT EVALUATION FOR KIDNEY TRANSPLANT: ICD-10-CM

## 2024-01-19 LAB
AORTIC VALVE PEAK VELOCITY: 1.03 M/S
AV PEAK GRADIENT: 4.2 MMHG
AVA (PEAK VEL): 3.81 CM2
EJECTION FRACTION APICAL 4 CHAMBER: 68.3
EJECTION FRACTION: 66 %
LEFT ATRIUM VOLUME AREA LENGTH INDEX BSA: 26.3 ML/M2
LEFT VENTRICLE INTERNAL DIMENSION DIASTOLE: 3.96 CM (ref 3.5–6)
LEFT VENTRICULAR OUTFLOW TRACT DIAMETER: 2.34 CM
MITRAL VALVE E/A RATIO: 1.08
MITRAL VALVE E/E' RATIO: 10.58
RIGHT VENTRICLE FREE WALL PEAK S': 10 CM/S
RIGHT VENTRICLE PEAK SYSTOLIC PRESSURE: 26.1 MMHG
TRICUSPID ANNULAR PLANE SYSTOLIC EXCURSION: 2 CM

## 2024-01-19 PROCEDURE — 3078F DIAST BP <80 MM HG: CPT | Performed by: STUDENT IN AN ORGANIZED HEALTH CARE EDUCATION/TRAINING PROGRAM

## 2024-01-19 PROCEDURE — 99214 OFFICE O/P EST MOD 30 MIN: CPT

## 2024-01-19 PROCEDURE — 3077F SYST BP >= 140 MM HG: CPT | Performed by: STUDENT IN AN ORGANIZED HEALTH CARE EDUCATION/TRAINING PROGRAM

## 2024-01-19 PROCEDURE — 3066F NEPHROPATHY DOC TX: CPT | Performed by: STUDENT IN AN ORGANIZED HEALTH CARE EDUCATION/TRAINING PROGRAM

## 2024-01-19 PROCEDURE — 93306 TTE W/DOPPLER COMPLETE: CPT | Performed by: INTERNAL MEDICINE

## 2024-01-19 PROCEDURE — 2500000004 HC RX 250 GENERAL PHARMACY W/ HCPCS (ALT 636 FOR OP/ED): Performed by: STUDENT IN AN ORGANIZED HEALTH CARE EDUCATION/TRAINING PROGRAM

## 2024-01-19 PROCEDURE — 93306 TTE W/DOPPLER COMPLETE: CPT

## 2024-01-19 RX ADMIN — PERFLUTREN 3 ML OF DILUTION: 6.52 INJECTION, SUSPENSION INTRAVENOUS at 11:43

## 2024-01-19 ASSESSMENT — PATIENT HEALTH QUESTIONNAIRE - PHQ9
5. POOR APPETITE OR OVEREATING: NOT AT ALL
SUM OF ALL RESPONSES TO PHQ9 QUESTIONS 1 & 2: 0
3. TROUBLE FALLING OR STAYING ASLEEP OR SLEEPING TOO MUCH: MORE THAN HALF THE DAYS
10. IF YOU CHECKED OFF ANY PROBLEMS, HOW DIFFICULT HAVE THESE PROBLEMS MADE IT FOR YOU TO DO YOUR WORK, TAKE CARE OF THINGS AT HOME, OR GET ALONG WITH OTHER PEOPLE: SOMEWHAT DIFFICULT
9. THOUGHTS THAT YOU WOULD BE BETTER OFF DEAD, OR OF HURTING YOURSELF: NOT AT ALL
1. LITTLE INTEREST OR PLEASURE IN DOING THINGS: NOT AT ALL
8. MOVING OR SPEAKING SO SLOWLY THAT OTHER PEOPLE COULD HAVE NOTICED. OR THE OPPOSITE, BEING SO FIGETY OR RESTLESS THAT YOU HAVE BEEN MOVING AROUND A LOT MORE THAN USUAL: NOT AT ALL
4. FEELING TIRED OR HAVING LITTLE ENERGY: NOT AT ALL
SUM OF ALL RESPONSES TO PHQ QUESTIONS 1-9: 3
6. FEELING BAD ABOUT YOURSELF - OR THAT YOU ARE A FAILURE OR HAVE LET YOURSELF OR YOUR FAMILY DOWN: NOT AT ALL
2. FEELING DOWN, DEPRESSED OR HOPELESS: NOT AT ALL
7. TROUBLE CONCENTRATING ON THINGS, SUCH AS READING THE NEWSPAPER OR WATCHING TELEVISION: SEVERAL DAYS

## 2024-01-19 ASSESSMENT — ANXIETY QUESTIONNAIRES
4. TROUBLE RELAXING: NOT AT ALL
2. NOT BEING ABLE TO STOP OR CONTROL WORRYING: NOT AT ALL
5. BEING SO RESTLESS THAT IT IS HARD TO SIT STILL: NOT AT ALL
3. WORRYING TOO MUCH ABOUT DIFFERENT THINGS: NOT AT ALL
7. FEELING AFRAID AS IF SOMETHING AWFUL MIGHT HAPPEN: NOT AT ALL
6. BECOMING EASILY ANNOYED OR IRRITABLE: NOT AT ALL
IF YOU CHECKED OFF ANY PROBLEMS ON THIS QUESTIONNAIRE, HOW DIFFICULT HAVE THESE PROBLEMS MADE IT FOR YOU TO DO YOUR WORK, TAKE CARE OF THINGS AT HOME, OR GET ALONG WITH OTHER PEOPLE: NOT DIFFICULT AT ALL
1. FEELING NERVOUS, ANXIOUS, OR ON EDGE: NOT AT ALL
GAD7 TOTAL SCORE: 0

## 2024-01-19 ASSESSMENT — PAIN SCALES - GENERAL: PAINLEVEL: 0-NO PAIN

## 2024-01-19 NOTE — PROGRESS NOTES
"SW met with Pt alone for psychosocial update. Pt was pleasant and engaged. Pt shared her primary support was parking the car during time of follow-up visit. Pt has [facility] for insurance. Pt reported she had a hospitalization in November, but she is doing better. Pt reported good compliance with her home hemo, medications, and appointments. Pts primary support is her spouse, Kemar ([phone]). Pt secondary support is her sister, Tory ([phone]). Pt changed her secondary from her mom, Reema to her sister, Tory due to Pt's mom having several strokes and being on \"the cusp of Alzheimer's.\" Pt shared Pt sister can take time off from work, drives and has a working vehicle, and has no limitations. SW called and confirmed secondary support. Pt shared she also has several sisters who can support Pt.  Pt described their mood as \"pretty much ok. 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 SW helped Pt fill out PHQ-9 and EVANGELISTA-7. Pt scored a 3 on the PHQ-9, indicating minimal clinical depression. Pt scored a 0 on the EVANGELISTA-7, indicating no clinical anxiety. Pt denied any current substance use. SW discussed the inpatient transplant stay and the need for support to be a part of education session. SW discussed the potential need for dialysis after transplant. SW also discussed the frequency of post op appointments - once a week surgeon/nephrologist visits and twice a week labs. Pt expressed understanding. Pt is low psychosocial risk. SW will follow up with Pt annually. 4 days     "

## 2024-01-19 NOTE — PROGRESS NOTES
Patient seen in annual clinic today with Dr. Holman.  Patient came with her .  She continues to do home hemo.  She denies any recent falls or blood transfusions.  She did have a recent hospital stay for an infiltrated graft that required a fistulogram with angio.  Patient is now able to access graft.  Patient will need to update her mammogram and pap/pelvic exam- she will call me once she completes the testings so that I can request the reports.  Dr. Holman would like for the patient to follow up with her dermatologist for a wound on her back.  - Patient may remain status 1 at this time.  She has a follow up visit in cardiology with Dr. Vuong in February.  Additional monthly blood sample kits will be mailed to her home.

## 2024-01-19 NOTE — PROGRESS NOTES
Chief Complaint: Patient presents for kidney transplant annual update    History of Present Illness:  Rody Espinosa  is a 53 y.o. female presents for her yearly update.    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 She underwent a  donor renal transplant on 2007. The kidney allograft had lasted for 7 years and it failed in . Also has hx of RUE lymphedema from sirolimus. Rody is currently doing home hemodialysis, using Right thigh AVF, 4 days per week. Anuric. No potential living donor at this time.      Has been having vision loss. 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 Pending further interventions in right. Taken off coumadin for recurrent bleeding in eye. Continuing on ASA. Prior clots assisiated with her SVC syndrome. Denies lower extremity DVT    She is 100% PRA.    Last seen with cardiology 2023.     She has right lower extremity AVG and right subclavian/brachiocephalic stent.  Large collaterals along left side in subcutaneous tissue. Prior kidney transplant on the right side.    Adult cath 23:   1. Mild CAD with patent RCA stent in a left-dominant system.    Nuclear Stress 22:   1.  Normal myocardial perfusion study without evidence of inducible  ischemia or prior infarction. Findings similar to prior study.  2. The left ventricle is normal in size.  3. Normal LV wall motion with an LV EF estimated at greater than 65%.    Hemodialysis: 4 days a week at home via    ROS: anuric since her last transplant failed (15 years)     Past Medical History:  HTN  CAD s/p stents   Hx SVC syndrome, DVT associated with SVC syn    Past Surgical History:  RLE AVG  Renal transplant , failed   Lap hysterectomy    Review of Systems:  Cardiac: Denies chest pain,  palpitations  : Normal urine output. Denies history of gross hematuria, nephrolithiasis, urinary retention, or recurrent UTIs.  Vascular: Denies personal or familial history of DVT/PE. No active claudication or non-healing LE wounds.  Functional Status: Can walk up 2 flights of stairs    Prior transplant: yes    Physical Exam:  Gen: A+OX3; NAD  HEENT: PERRL, sclera anicteric, MMM  Cardiac: RRR  Chest: Normal inspiratory effort  Abdomen: S/NT/ND.  Ext: No LE edema  Vascular: 2+ palpable femoral pulses left, RLE graft  Psychiatric: Normal mood, affect    Assessment/Plan:  - Needs to see dermatology for right back chronic skin changes  - At time of surgery plan for midline incision to preserve left abdominal venous collaterals which are draining her LUE   - ECHO pending   - Optho follow up    Time Attestation:  I spent 60 minutes with the patient, over 50 minutes in counseling and education as outlined above.

## 2024-01-31 ENCOUNTER — LAB REQUISITION (OUTPATIENT)
Dept: LAB | Facility: CLINIC | Age: 54
End: 2024-01-31
Payer: COMMERCIAL

## 2024-01-31 DIAGNOSIS — N18.6 END STAGE RENAL DISEASE (MULTI): ICD-10-CM

## 2024-01-31 LAB — FREEZE CROSSMATCH: NORMAL

## 2024-02-01 ENCOUNTER — APPOINTMENT (OUTPATIENT)
Dept: CARDIOLOGY | Facility: CLINIC | Age: 54
End: 2024-02-01
Payer: COMMERCIAL

## 2024-02-02 PROBLEM — N80.9 ENDOMETRIOSIS: Status: ACTIVE | Noted: 2024-02-02

## 2024-02-02 PROBLEM — Z98.890 HISTORY OF CARDIOVASCULAR SURGERY: Status: ACTIVE | Noted: 2023-04-04

## 2024-02-02 PROBLEM — N18.5 HYPERTENSIVE KIDNEY DISEASE, STAGE V (MULTI): Status: ACTIVE | Noted: 2023-04-04

## 2024-02-02 PROBLEM — I12.0 HYPERTENSIVE KIDNEY DISEASE, STAGE V (MULTI): Status: ACTIVE | Noted: 2023-04-04

## 2024-02-05 ENCOUNTER — CONSULT (OUTPATIENT)
Dept: CARDIOLOGY | Facility: CLINIC | Age: 54
End: 2024-02-05
Payer: COMMERCIAL

## 2024-02-05 VITALS
SYSTOLIC BLOOD PRESSURE: 127 MMHG | HEIGHT: 66 IN | HEART RATE: 85 BPM | DIASTOLIC BLOOD PRESSURE: 84 MMHG | BODY MASS INDEX: 31.01 KG/M2

## 2024-02-05 DIAGNOSIS — I25.10 CORONARY ARTERY DISEASE INVOLVING NATIVE CORONARY ARTERY OF NATIVE HEART WITHOUT ANGINA PECTORIS: Primary | ICD-10-CM

## 2024-02-05 DIAGNOSIS — Z01.810 PREOP CARDIOVASCULAR EXAM: ICD-10-CM

## 2024-02-05 DIAGNOSIS — I10 ESSENTIAL HYPERTENSION: ICD-10-CM

## 2024-02-05 PROCEDURE — 99214 OFFICE O/P EST MOD 30 MIN: CPT | Performed by: STUDENT IN AN ORGANIZED HEALTH CARE EDUCATION/TRAINING PROGRAM

## 2024-02-05 PROCEDURE — 93010 ELECTROCARDIOGRAM REPORT: CPT | Performed by: INTERNAL MEDICINE

## 2024-02-05 PROCEDURE — 93005 ELECTROCARDIOGRAM TRACING: CPT | Performed by: STUDENT IN AN ORGANIZED HEALTH CARE EDUCATION/TRAINING PROGRAM

## 2024-02-05 ASSESSMENT — PAIN SCALES - GENERAL: PAINLEVEL: 0-NO PAIN

## 2024-02-05 ASSESSMENT — PATIENT HEALTH QUESTIONNAIRE - PHQ9
2. FEELING DOWN, DEPRESSED OR HOPELESS: NOT AT ALL
1. LITTLE INTEREST OR PLEASURE IN DOING THINGS: NOT AT ALL
SUM OF ALL RESPONSES TO PHQ9 QUESTIONS 1 AND 2: 0

## 2024-02-05 ASSESSMENT — ENCOUNTER SYMPTOMS
DEPRESSION: 0
OCCASIONAL FEELINGS OF UNSTEADINESS: 0
LOSS OF SENSATION IN FEET: 1

## 2024-02-05 NOTE — LETTER
February 5, 2024     Willow Haile MD  [id number]  Department Of Surgery-Transplant  [facility] [id number]    Patient: Rody Espinosa   YOB: 1970   Date of Visit: 2/5/2024       Dear Dr. Willow Haile MD:    Thank you for referring Rody Espinosa to me for evaluation. Below are my notes for this consultation.  If you have questions, please do not hesitate to call me. I look forward to following your patient along with you.       Sincerely,     Tomas Vuong MD      CC: Jaci Locke RN  ______________________________________________________________________________________    Location of visit: [address]   Type of Visit: New    Chief Complaint:  Patient was referred to Cardiology by Abdominal Transplant Team for pretransplant evaluation.    History Of Present Illness:    Rody Espinosa is a 53 y.o. 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    Patient denies chest pain, dyspnea on exertion, shortness of breath, orthopnea, PND, palpitations, dizziness, lightheadedness, syncope, or claudication. She has lymphedema on her left arm after transplant.    Today's ECG shows sinus rhythm at 85 bpm, normal AV conduction and diffuse ventricular repolarization abnormalities.    Contrast allergy: yes    Calcium Score: N/A    [facility]: 5/17/2023  - Mild left coronary artery CAD with patent RCA stent in a co-dominant system.     Stress test: nuclear stress test with Lexiscan, Date 11/16/2022. Results:  1.  Normal myocardial perfusion study without evidence of inducible ischemia or prior infarction. Findings similar to prior study.  2. The left ventricle is normal in size.  3. Normal LV wall motion  with an LV EF estimated at greater than 65%.    Transthoracic echocardiogram 1/19/2024: normal LV function with 65% LVEF, mild LV wall thickening, no regional wall motion abnormalities, normal diastolic function.    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    Past Surgical History:  She has a past surgical history that includes right lower extremity AVG ; Hysteroscopy (05/15/2013),right subclavian/brachiocephalic stent.     Social History:  She has no history on file for tobacco use, alcohol use, and drug use.    Family history:  No family history on file.    Allergies:  Betadine [povidone-iodine], Iodinated contrast media, Adhesive tape-silicones, and Iodine    Outpatient Medications:  Current Outpatient Medications   Medication Instructions   • acetaminophen (TYLENOL) 650 mg, oral, Every 4 hours PRN   • atorvastatin (LIPITOR) 40 mg, oral, Nightly   • calcitriol (Rocaltrol) 0.5 mcg capsule oral   • cetirizine (ZYRTEC) 5 mg, oral, Daily RT   • clobetasol (Temovate) 0.05 % ointment Apply to itchy spot on back twice daily five days a week   • diphenhydrAMINE (Benadryl Allergy) 25 mg tablet oral, Nightly PRN   • docusate sodium (COLACE) 100 mg, oral, 2 times daily   • glipiZIDE (GLUCOTROL) 10 mg, oral, 2 times daily   • ketoconazole (NIZOral) 2 % shampoo WASH SCALP ONCE EVERY OTHER WEEK  LEAVE ON FOR 15 MINUTES THEN RINSE   • metoprolol succinate XL (TOPROL-XL) 25 mg, oral, Daily   • multivitamin tablet 1 tablet, oral, Daily   • Nephron FA 66 mg iron- 1,000 mcg tablet 1 tablet, oral, Daily   • omeprazole (PRILOSEC) 40 mg, oral, 2 times daily   • ondansetron (Zofran) 4 mg tablet oral, Every 8 hours PRN   • sevelamer carbonate (RENVELA) 1,600 mg, oral, 3 times daily with meals   •  "sodium bicarbonate 325 mg tablet 3 tablet po daily     Last Recorded Vitals:  Vitals:    02/05/24 1506   BP: 127/84   BP Location: Left leg   Patient Position: Sitting   BP Cuff Size: Large adult   Pulse: 85   Height: 1.676 m (5' 6\")     Physical Exam:      2/5/2024     3:06 PM 1/19/2024     9:57 AM 10/26/2023     2:12 PM 8/21/2023    11:03 AM 3/13/2023    10:44 AM 11/23/2022     1:18 PM   Vitals   Systolic 127 140 126 143 116 121   Diastolic 84 74 73 79 73 74   Heart Rate 85 89 87 86 89 96   Temp  36.3 °C (97.4 °F) 36.1 °C (97 °F)      Resp   18      Height (in) 1.676 m (5' 6\")  1.689 m (5' 6.5\") 1.676 m (5' 6\") 1.676 m (5' 6\") 1.676 m (5' 6\")   Weight (lb)  192.1 187.39 189.06 192.5 192   BMI 31.01 kg/m2 30.54 kg/m2 29.79 kg/m2 30.52 kg/m2 31.07 kg/m2 30.99 kg/m2   BSA (m2) 2.01 m2 2.02 m2 2 m2 2 m2 2.02 m2 2.01 m2   Visit Report  Report         Wt Readings from Last 5 Encounters:   01/19/24 87.1 kg (192 lb 1.6 oz)   10/26/23 85 kg (187 lb 6.3 oz)   08/21/23 85.8 kg (189 lb 1 oz)   03/13/23 87.3 kg (192 lb 8 oz)   11/23/22 87.1 kg (192 lb)     General: Sitting up comfortably in chair; in no apparent distress.  HEENT: Normocephalic; atraumatic. Well hydrated.  Eyes: Anicteric sclera. Extraocular movement intact.  Neck: Supple; no thyromegaly; normal jugular venous pressure, no bruits.  Respiratory: Bilateral air entry equal. No wheezing.  Cardiovascular: Normal S1, S2; no murmurs auscultated.  Abdomen: Nondistended; nontender. (+) bowel sounds.  Extremities: No peripheral edema present. Pulses 2+ diffusely.  Neurological: Oriented to time, place, and person; nonfocal.  Psychiatric: Normal affect.     Last Labs Reviewed:  CBC -  Recent Labs     05/17/23  1205 04/04/23  1136 11/16/22  1240   WBC 9.7 7.7 7.2   HGB 12.4 12.9 14.3   HCT 35.2* 36.8 39.7    167 171   MCV 88 88 91     CMP -  Recent Labs     05/17/23  1205 04/04/23  1136 11/16/22  1240   * 135* 136   K 4.3 3.8 3.7   CL 89* 91* 93*   CO2 29 26 " 29   ANIONGAP 16 22* 18   BUN 37* 42* 31*   CREATININE 10.10* 12.43* 11.29*     Recent Labs     22  1240   ALBUMIN 4.3   ALKPHOS 72   ALT 20   AST 16   BILITOT 0.9     COAGULATION PANEL -  Recent Labs     23  1136   INR 1.0     HEME/ENDO -  Recent Labs     22  1240 03/15/22  0902 20  1115 20  1531   HGBA1C 5.8* 6.4* 5.4 5.2     Last Cardiology/Imaging Tests Personally Reviewed (if images available) and Interpreted:  EC2023 Sinus rhythm at 82 bpm, normal AV conduction, and normal ventricular repolarization     Echocardiogram:  Transthoracic Echo (TTE) Complete 2024  Height:            170.18 cm            Weight:            87.09 kg             Admission Status:     Outpatient  BSA:               1.99 m2               Blood Pressure: 140 /74 mmHg    Left Ventricle: The left ventricular systolic function is normal, with an estimated ejection fraction of 65%. There are no regional wall motion abnormalities. The left ventricular cavity size is normal. Spectral Doppler shows a normal pattern of left ventricular diastolic filling.  Left Atrium: The left atrium is normal in size.  Right Ventricle: The right ventricle is normal in size. There is normal right ventricular global systolic function.  Right Atrium: The right atrium is normal in size.  Aortic Valve: The aortic valve is trileaflet. There is no evidence of aortic valve regurgitation. The peak instantaneous gradient of the aortic valve is 4.2 mmHg.  Mitral Valve: The mitral valve is normal in structure. There is trace mitral valve regurgitation.  Tricuspid Valve: The tricuspid valve is structurally normal. There is trace to mild tricuspid regurgitation. The Doppler estimated RVSP is within normal limits at 26.1 mmHg.  Pulmonic Valve: The pulmonic valve is structurally normal. There is trace pulmonic valve regurgitation.  Pericardium: There is no pericardial effusion noted.  Aorta: The aortic root is normal.  In comparison  to the previous echocardiogram(s): Compared with study from 11/16/2022, no significant change.    CONCLUSIONS:   1. Left ventricular systolic function is normal with a 65% estimated ejection fraction.   2. RVSP within normal limits.    Cath:  Adult Cath   Patient Name:     FERNANDO HERNANDEZ Performing Physician:  [id number] Casanova  Study Date:       5/17/2023          Verifying Physician:   [id number] Casanova  MRN/PID:          [id number]           Cardiologist/Co-scrub:    Study: Left Heart Catheterization    Coronary Angiography:  The coronary circulation is left dominant.    Left Main Coronary Artery:  The left main coronary artery is a normal caliber vessel. The left main arises normally from the left coronary sinus of Valsalva and bifurcates into the LAD and circumflex coronary arteries. The left main coronary artery showed no significant disease or stenosis greater than 30%.    Left Anterior Descending Coronary Artery Distribution:  The left anterior descending coronary artery is a normal caliber vessel. The LAD arises normally from the left main coronary artery. The LAD demonstrated no significant disease or stenosis greater than 30%. The 1st diagonal branch showed no significant disease or stenosis greater than 30%. The 2nd diagonal branch demonstrated no significant disease or stenosis greater than 30%.    Circumflex Coronary Artery Distribution:  The circumflex coronary artery is a normal caliber vessel. The circumflex arises normally from the left main coronary artery and terminates in the AV groove. The circumflex revealed no significant disease or stenosis greater than 30%. The 1st obtuse marginal branch showed no significant disease or stenosis greater than 30%. The 2nd obtuse marginal branch demonstrated no significant disease or stenosis greater than 30%. The left posterolateral branch showed no significant disease or stenosis greater than 30%. The left posterior descending artery showed no  significant disease or stenosis greater than 30%.    Right Coronary Artery Distribution:  The right coronary artery is a normal caliber vessel. The RCA arises normally from the right sinus of Valsalva. The RCA showed no significant disease or stenosis greater than 30% and patent proximal-mid RCA stent.  ____________________________________________________________________________________  CONCLUSIONS:  1. Mild CAD with patent RCA stent in a left-dominant system.    Stress Test:  NM cardiac stress rest (myocardial perfusion MIBI) 11/16/2022  STUDY:  CARDIAC STRESS/REST INJECTION; PART 2 STRESS OR REST (NO CHARGE); CARDIAC STRESS/REST (MYOCARDIAL PERFUSION/MIBI);  11/16/2022 12:03 pm    FINDINGS:  Both stress and rest studies demonstrate grossly normal perfusion throughout the left ventricle.    The left ventricle is normal in size.    Gated images demonstrate normal LV wall motion with an LV EF estimated at greater than 65% on the stress images.    Attenuation correction CT images demonstrate no gross anatomic abnormalities.    Impression  1.  Normal myocardial perfusion study without evidence of inducible ischemia or prior infarction. Findings similar to prior study.  2. The left ventricle is normal in size.  3. Normal LV wall motion with an LV EF estimated at greater than 65%.    Cardiac CT/MRI:  No results found.     Other CT:  No results found.    CV RISK FACTORS:   # Hypertension: Last BP: 127/84.  # Hyperlipidemia: Last Tchol No results found for requested labs within last 365 days. / LDL No results found for requested labs within last 365 days. / HDL No results found for requested labs within last 365 days. / TRIG No results found for requested labs within last 365 days. (No results in last year.).  # Type II Diabetes Mellitus: Last A1c No results found for requested labs within last 365 days. (No results in last year.).  # Obesity: Last BMI:  .  # CKD: Last BUN/Cr (GFR): 37/10.10 (No results found for  requested labs within last 365 days.), 5/17/2023: 12:05 PM.    ASCV RISK:  The ASCVD Risk score (Derrick ROGERS, et al., 2019) failed to calculate for the following reasons:    The patient has a prior MI or stroke diagnosis    Assessment/Plan  53 y.o. 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 No new cardiovascular symptoms/no red flags like chest pain, JONES, palpitations or syncope. Hypertension is well controlled with diet and low dose beta blocker.  She has known CAD with a LHC from 2023 which ruled out new significant coronary disease and showed patent stent.     Recommendations:  - Pretransplant evaluation: No cardiac contraindication for being listed for renal transplant, repeat LHC every 3 years is continues asymptomatic and not transplanted yet  - CAD: check lipid panel with PCP and regular cardiologist to confirm LDL is <70 and closer to 55  - HTN: continue low dose Toprol XL  - I will contact the Transplant team to report that cardiac clearance is completed  - I spent 55 minutes assessing the case between pre-charting, face-to-face patient interaction, and documentation    Tomas Vuong MD

## 2024-02-05 NOTE — PROGRESS NOTES
Location of visit: [address]   Type of Visit: New    Chief Complaint:  Patient was referred to Cardiology by Abdominal Transplant Team for pretransplant evaluation.    History Of Present Illness:    Rody Espinosa is a 53 y.o. 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    Patient denies chest pain, dyspnea on exertion, shortness of breath, orthopnea, PND, palpitations, dizziness, lightheadedness, syncope, or claudication. She has lymphedema on her left arm after transplant.    Today's ECG shows sinus rhythm at 85 bpm, normal AV conduction and diffuse ventricular repolarization abnormalities.    Contrast allergy: yes    Calcium Score: N/A    [facility]: 5/17/2023  - Mild left coronary artery CAD with patent RCA stent in a co-dominant system.     Stress test: nuclear stress test with Lexiscan, Date 11/16/2022. Results:  1.  Normal myocardial perfusion study without evidence of inducible ischemia or prior infarction. Findings similar to prior study.  2. The left ventricle is normal in size.  3. Normal LV wall motion with an LV EF estimated at greater than 65%.    Transthoracic echocardiogram 1/19/2024: normal LV function with 65% LVEF, mild LV wall thickening, no regional wall motion abnormalities, normal diastolic function.    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    Past Surgical History:  She has a past surgical history that includes right lower extremity AVG ; Hysteroscopy  "(05/15/2013),right subclavian/brachiocephalic stent.     Social History:  She has no history on file for tobacco use, alcohol use, and drug use.    Family history:  No family history on file.    Allergies:  Betadine [povidone-iodine], Iodinated contrast media, Adhesive tape-silicones, and Iodine    Outpatient Medications:  Current Outpatient Medications   Medication Instructions    acetaminophen (TYLENOL) 650 mg, oral, Every 4 hours PRN    atorvastatin (LIPITOR) 40 mg, oral, Nightly    calcitriol (Rocaltrol) 0.5 mcg capsule oral    cetirizine (ZYRTEC) 5 mg, oral, Daily RT    clobetasol (Temovate) 0.05 % ointment Apply to itchy spot on back twice daily five days a week    diphenhydrAMINE (Benadryl Allergy) 25 mg tablet oral, Nightly PRN    docusate sodium (COLACE) 100 mg, oral, 2 times daily    glipiZIDE (GLUCOTROL) 10 mg, oral, 2 times daily    ketoconazole (NIZOral) 2 % shampoo WASH SCALP ONCE EVERY OTHER WEEK  LEAVE ON FOR 15 MINUTES THEN RINSE    metoprolol succinate XL (TOPROL-XL) 25 mg, oral, Daily    multivitamin tablet 1 tablet, oral, Daily    Nephron FA 66 mg iron- 1,000 mcg tablet 1 tablet, oral, Daily    omeprazole (PRILOSEC) 40 mg, oral, 2 times daily    ondansetron (Zofran) 4 mg tablet oral, Every 8 hours PRN    sevelamer carbonate (RENVELA) 1,600 mg, oral, 3 times daily with meals    sodium bicarbonate 325 mg tablet 3 tablet po daily     Last Recorded Vitals:  Vitals:    02/05/24 1506   BP: 127/84   BP Location: Left leg   Patient Position: Sitting   BP Cuff Size: Large adult   Pulse: 85   Height: 1.676 m (5' 6\")     Physical Exam:      2/5/2024     3:06 PM 1/19/2024     9:57 AM 10/26/2023     2:12 PM 8/21/2023    11:03 AM 3/13/2023    10:44 AM 11/23/2022     1:18 PM   Vitals   Systolic 127 140 126 143 116 121   Diastolic 84 74 73 79 73 74   Heart Rate 85 89 87 86 89 96   Temp  36.3 °C (97.4 °F) 36.1 °C (97 °F)      Resp   18      Height (in) 1.676 m (5' 6\")  1.689 m (5' 6.5\") 1.676 m (5' 6\") 1.676 m " "(5' 6\") 1.676 m (5' 6\")   Weight (lb)  192.1 187.39 189.06 192.5 192   BMI 31.01 kg/m2 30.54 kg/m2 29.79 kg/m2 30.52 kg/m2 31.07 kg/m2 30.99 kg/m2   BSA (m2) 2.01 m2 2.02 m2 2 m2 2 m2 2.02 m2 2.01 m2   Visit Report  Report         Wt Readings from Last 5 Encounters:   24 87.1 kg (192 lb 1.6 oz)   10/26/23 85 kg (187 lb 6.3 oz)   23 85.8 kg (189 lb 1 oz)   23 87.3 kg (192 lb 8 oz)   22 87.1 kg (192 lb)     General: Sitting up comfortably in chair; in no apparent distress.  HEENT: Normocephalic; atraumatic. Well hydrated.  Eyes: Anicteric sclera. Extraocular movement intact.  Neck: Supple; no thyromegaly; normal jugular venous pressure, no bruits.  Respiratory: Bilateral air entry equal. No wheezing.  Cardiovascular: Normal S1, S2; no murmurs auscultated.  Abdomen: Nondistended; nontender. (+) bowel sounds.  Extremities: No peripheral edema present. Pulses 2+ diffusely.  Neurological: Oriented to time, place, and person; nonfocal.  Psychiatric: Normal affect.     Last Labs Reviewed:  CBC -  Recent Labs     23  1205 23  1136 22  1240   WBC 9.7 7.7 7.2   HGB 12.4 12.9 14.3   HCT 35.2* 36.8 39.7    167 171   MCV 88 88 91     CMP -  Recent Labs     23  1205 23  1136 22  1240   * 135* 136   K 4.3 3.8 3.7   CL 89* 91* 93*   CO2 29 26 29   ANIONGAP 16 22* 18   BUN 37* 42* 31*   CREATININE 10.10* 12.43* 11.29*     Recent Labs     22  1240   ALBUMIN 4.3   ALKPHOS 72   ALT 20   AST 16   BILITOT 0.9     HEME/ENDO -  Recent Labs     22  1240 03/15/22  0902 20  1115 20  1531   HGBA1C 5.8* 6.4* 5.4 5.2     Last Cardiology/Imaging Tests Personally Reviewed (if images available) and Interpreted:  EC2023 Sinus rhythm at 82 bpm, normal AV conduction, and normal ventricular repolarization     Echocardiogram:  Transthoracic Echo (TTE) Complete 2024  Height:            170.18 cm            Weight:            87.09 kg             " Admission Status:     Outpatient  BSA:               1.99 m2               Blood Pressure: 140 /74 mmHg    Left Ventricle: The left ventricular systolic function is normal, with an estimated ejection fraction of 65%. There are no regional wall motion abnormalities. The left ventricular cavity size is normal. Spectral Doppler shows a normal pattern of left ventricular diastolic filling.  Left Atrium: The left atrium is normal in size.  Right Ventricle: The right ventricle is normal in size. There is normal right ventricular global systolic function.  Right Atrium: The right atrium is normal in size.  Aortic Valve: The aortic valve is trileaflet. There is no evidence of aortic valve regurgitation. The peak instantaneous gradient of the aortic valve is 4.2 mmHg.  Mitral Valve: The mitral valve is normal in structure. There is trace mitral valve regurgitation.  Tricuspid Valve: The tricuspid valve is structurally normal. There is trace to mild tricuspid regurgitation. The Doppler estimated RVSP is within normal limits at 26.1 mmHg.  Pulmonic Valve: The pulmonic valve is structurally normal. There is trace pulmonic valve regurgitation.  Pericardium: There is no pericardial effusion noted.  Aorta: The aortic root is normal.  In comparison to the previous echocardiogram(s): Compared with study from 11/16/2022, no significant change.    CONCLUSIONS:   1. Left ventricular systolic function is normal with a 65% estimated ejection fraction.   2. RVSP within normal limits.    Cath:  Adult Cath   Patient Name:     FERNANDO HERNANDEZ Performing Physician:  [id number] Casanova  Study Date:       5/17/2023          Verifying Physician:   [id number] Casanova  MRN/PID:          [id number]           Cardiologist/Co-scrub:    Study: Left Heart Catheterization    Coronary Angiography:  The coronary circulation is left dominant.    Left Main Coronary Artery:  The left main coronary artery is a normal caliber vessel. The left main arises  normally from the left coronary sinus of Valsalva and bifurcates into the LAD and circumflex coronary arteries. The left main coronary artery showed no significant disease or stenosis greater than 30%.    Left Anterior Descending Coronary Artery Distribution:  The left anterior descending coronary artery is a normal caliber vessel. The LAD arises normally from the left main coronary artery. The LAD demonstrated no significant disease or stenosis greater than 30%. The 1st diagonal branch showed no significant disease or stenosis greater than 30%. The 2nd diagonal branch demonstrated no significant disease or stenosis greater than 30%.    Circumflex Coronary Artery Distribution:  The circumflex coronary artery is a normal caliber vessel. The circumflex arises normally from the left main coronary artery and terminates in the AV groove. The circumflex revealed no significant disease or stenosis greater than 30%. The 1st obtuse marginal branch showed no significant disease or stenosis greater than 30%. The 2nd obtuse marginal branch demonstrated no significant disease or stenosis greater than 30%. The left posterolateral branch showed no significant disease or stenosis greater than 30%. The left posterior descending artery showed no significant disease or stenosis greater than 30%.    Right Coronary Artery Distribution:  The right coronary artery is a normal caliber vessel. The RCA arises normally from the right sinus of Valsalva. The RCA showed no significant disease or stenosis greater than 30% and patent proximal-mid RCA stent.  ____________________________________________________________________________________  CONCLUSIONS:  1. Mild CAD with patent RCA stent in a left-dominant system.    Stress Test:  NM cardiac stress rest (myocardial perfusion MIBI) 11/16/2022  STUDY:  CARDIAC STRESS/REST INJECTION; PART 2 STRESS OR REST (NO CHARGE); CARDIAC STRESS/REST (MYOCARDIAL PERFUSION/MIBI);  11/16/2022 12:03  pm    FINDINGS:  Both stress and rest studies demonstrate grossly normal perfusion throughout the left ventricle.    The left ventricle is normal in size.    Gated images demonstrate normal LV wall motion with an LV EF estimated at greater than 65% on the stress images.    Attenuation correction CT images demonstrate no gross anatomic abnormalities.    Impression  1.  Normal myocardial perfusion study without evidence of inducible ischemia or prior infarction. Findings similar to prior study.  2. The left ventricle is normal in size.  3. Normal LV wall motion with an LV EF estimated at greater than 65%.    Cardiac CT/MRI:  No results found.     Other CT:  No results found.    CV RISK FACTORS:   # Hypertension: Last BP: 127/84.  # Hyperlipidemia: Last Tchol No results found for requested labs within last 365 days. / LDL No results found for requested labs within last 365 days. / HDL No results found for requested labs within last 365 days. / TRIG No results found for requested labs within last 365 days. (No results in last year.).  # Type II Diabetes Mellitus: Last A1c No results found for requested labs within last 365 days. (No results in last year.).  # Obesity: Last BMI:  .  # CKD: Last BUN/Cr (GFR): 37/10.10 (No results found for requested labs within last 365 days.), 5/17/2023: 12:05 PM.    ASCV RISK:  The ASCVD Risk score (Derrick ROGERS, et al., 2019) failed to calculate for the following reasons:    The patient has a prior MI or stroke diagnosis    Assessment/Plan   53 y.o. 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 No new cardiovascular symptoms/no red flags like chest pain, JONES, palpitations or syncope.  Hypertension is well controlled with diet and low dose beta blocker.  She has known CAD with a LHC from 2023 which ruled out new significant coronary disease and showed patent stent.     Recommendations:  - Pretransplant evaluation: No cardiac contraindication for being listed for renal transplant, repeat LHC every 3 years is continues asymptomatic and not transplanted yet  - CAD: check lipid panel with PCP and regular cardiologist to confirm LDL is <70 and closer to 55  - HTN: continue low dose Toprol XL  - I will contact the Transplant team to report that cardiac clearance is completed  - I spent 55 minutes assessing the case between pre-charting, face-to-face patient interaction, and documentation    Tomas Vuong MD

## 2024-02-14 LAB
ATRIAL RATE: 85 BPM
P AXIS: 68 DEGREES
P OFFSET: 187 MS
P ONSET: 134 MS
PR INTERVAL: 168 MS
Q ONSET: 218 MS
QRS COUNT: 14 BEATS
QRS DURATION: 84 MS
QT INTERVAL: 402 MS
QTC CALCULATION(BAZETT): 478 MS
QTC FREDERICIA: 451 MS
R AXIS: 62 DEGREES
T AXIS: 71 DEGREES
T OFFSET: 419 MS
VENTRICULAR RATE: 85 BPM

## 2024-02-15 PROCEDURE — 86808 CYTOTOXIC ANTIBODY SCREENING: CPT | Mod: OUT | Performed by: SURGERY

## 2024-02-16 DIAGNOSIS — E78.5 HYPERLIPIDEMIA, UNSPECIFIED HYPERLIPIDEMIA TYPE: ICD-10-CM

## 2024-02-16 RX ORDER — ATORVASTATIN CALCIUM 40 MG/1
40 TABLET, FILM COATED ORAL NIGHTLY
Qty: 90 TABLET | Refills: 3 | Status: SHIPPED | OUTPATIENT
Start: 2024-02-16

## 2024-02-20 RX ORDER — METRONIDAZOLE 500 MG/1
TABLET ORAL
COMMUNITY
Start: 2024-02-12 | End: 2024-02-21 | Stop reason: ALTCHOICE

## 2024-02-21 ENCOUNTER — OFFICE VISIT (OUTPATIENT)
Dept: CARDIOLOGY | Facility: CLINIC | Age: 54
End: 2024-02-21
Payer: COMMERCIAL

## 2024-02-21 VITALS
HEIGHT: 66 IN | DIASTOLIC BLOOD PRESSURE: 75 MMHG | HEART RATE: 94 BPM | SYSTOLIC BLOOD PRESSURE: 113 MMHG | WEIGHT: 188.8 LBS | OXYGEN SATURATION: 96 % | BODY MASS INDEX: 30.34 KG/M2

## 2024-02-21 DIAGNOSIS — E11.9 DIABETES MELLITUS TYPE II, NON INSULIN DEPENDENT (MULTI): Primary | ICD-10-CM

## 2024-02-21 PROCEDURE — 1036F TOBACCO NON-USER: CPT | Performed by: INTERNAL MEDICINE

## 2024-02-21 PROCEDURE — 3074F SYST BP LT 130 MM HG: CPT | Performed by: INTERNAL MEDICINE

## 2024-02-21 PROCEDURE — 3078F DIAST BP <80 MM HG: CPT | Performed by: INTERNAL MEDICINE

## 2024-02-21 PROCEDURE — 99214 OFFICE O/P EST MOD 30 MIN: CPT | Performed by: INTERNAL MEDICINE

## 2024-02-21 RX ORDER — METOPROLOL SUCCINATE 25 MG/1
25 TABLET, EXTENDED RELEASE ORAL DAILY
Qty: 30 TABLET | Refills: 3 | Status: SHIPPED | OUTPATIENT
Start: 2024-02-21 | End: 2025-02-20

## 2024-02-21 ASSESSMENT — ENCOUNTER SYMPTOMS
DEPRESSION: 0
LOSS OF SENSATION IN FEET: 0
OCCASIONAL FEELINGS OF UNSTEADINESS: 1

## 2024-02-21 NOTE — PROGRESS NOTES
Primary Care Physician: Perfecto Mcghee MD  Date of Visit: 02/21/2024  9:40 AM EST  Location of [place] [facility] [place] [address]     Chief Complaint:   Chief Complaint   Patient presents with    Follow-up        HPI / Summary:   Rody Espinosa is a 53 y.o. female presents for followup.     Hypertension, type 2 diabetes, PCI to the RCA in 2006, DDKT 2008, history of DVT prior on warfarin, ESRD,  Was seen by my colleague this month and prerenal transplant office evaluation.  Multiple procedures for SVC  Dialysis access has been a problem, had to go to [facility] for cataracts  5/17/2023 cath mild CAD, patent RCA stent.  Normal EF.  Specialty Problems          Cardiology Problems    MI (myocardial infarction) (CMS/HCC)     Last Assessment & Plan: Formatting of this note might be different from the original. Assessment: History of MI with 2 Cardiac stents. Takes Daily Aspirin. Denies any current CP, Heart Palpitations or SOB.         Superior vena cava compression syndrome    AV fistula stenosis (CMS/HCC)    Hypotension    Dialysis AV fistula malfunction (CMS/HCC)    AVF (arteriovenous fistula) (CMS/HCC)    Benign essential hypertension    Coronary artery disease involving native coronary artery of native heart without angina pectoris    Essential hypertension     Last Assessment & Plan: Formatting of this note might be different from the original. Resume home BP medications         Hyperlipidemia    Peripheral vascular disease (CMS/HCC)    Venous thrombosis    History of cardiovascular surgery    Preop cardiovascular exam        Past Medical History:   Diagnosis Date    Allergic purpura (CMS/HCC)     HSP (Henoch-Schonlein purpura) nephritis    Benign neoplasm of connective and other soft tissue, unspecified     Fibroid tumor    Compression of vein 11/16/2022    SVC syndrome    End stage renal disease (CMS/HCC)     ESRD (end stage renal disease)    Other conditions influencing health status     Acute  Myocardial Infarction    Streptococcal meningitis     Streptococcal meningitis          Past Surgical History:   Procedure Laterality Date    HYSTEROSCOPY  05/15/2013    Hysteroscopy With Endometrial Ablation    OTHER SURGICAL HISTORY  05/15/2013    Renal Transplant    OTHER SURGICAL HISTORY  05/15/2013    Parathyroid Resection Sub-Total Parathyroidectomy    OTHER SURGICAL HISTORY  05/15/2013    Parathyroid Surgery    OTHER SURGICAL HISTORY  05/15/2013    Repair Of Brow Ptosis Right Upper Eyelid    OTHER SURGICAL HISTORY  05/15/2013    Cardiac Cath Lesion 1, 1st Adjunct Treat Device: Stent    OTHER SURGICAL HISTORY  05/15/2013    Biopsy Renal    OTHER SURGICAL HISTORY  10/25/2018    Hemodialysis Access Type Arteriovenous Graft Right Thigh    OTHER SURGICAL HISTORY  10/25/2018    Hemodialysis Access Type Arteriovenous Fistula Left Arm    OTHER SURGICAL HISTORY  10/22/2014    Percutaneous Transluminal Angioplasty (Non-Coronary)          Social History:   reports that she has never smoked. She has never used smokeless tobacco. She reports current alcohol use. She reports that she does not use drugs.      Allergies:  Allergies   Allergen Reactions    Betadine [Povidone-Iodine] Unknown    Iodinated Contrast Media Itching    Adhesive Tape-Silicones Rash    Iodine Rash     Mild rash       Outpatient Medications:  Current Outpatient Medications   Medication Instructions    acetaminophen (TYLENOL) 650 mg, oral, Every 4 hours PRN    atorvastatin (LIPITOR) 40 mg, oral, Nightly    calcitriol (Rocaltrol) 0.5 mcg capsule oral    cetirizine (ZYRTEC) 5 mg, oral, Daily RT    clobetasol (Temovate) 0.05 % ointment Apply to itchy spot on back twice daily five days a week    diphenhydrAMINE (Benadryl Allergy) 25 mg tablet oral, Nightly PRN    docusate sodium (COLACE) 100 mg, oral, 2 times daily    glipiZIDE (GLUCOTROL) 10 mg, oral, 2 times daily    ketoconazole (NIZOral) 2 % shampoo WASH SCALP ONCE EVERY OTHER WEEK  LEAVE ON FOR 15  "MINUTES THEN RINSE    metoprolol succinate XL (TOPROL-XL) 25 mg, oral, Daily    multivitamin tablet 1 tablet, oral, Daily    Nephron FA 66 mg iron- 1,000 mcg tablet 1 tablet, oral, Daily    omeprazole (PRILOSEC) 40 mg, oral, 2 times daily    ondansetron (Zofran) 4 mg tablet oral, Every 8 hours PRN    sevelamer carbonate (RENVELA) 1,600 mg, oral, 3 times daily with meals    sodium bicarbonate 325 mg tablet 3 tablet po daily       ROS     Physical Exam:  Vitals:    02/21/24 1004   BP: 113/75   BP Location: Left leg   Patient Position: Sitting   BP Cuff Size: Large adult   Pulse: 94   SpO2: 96%   Weight: 85.6 kg (188 lb 12.8 oz)   Height: 1.676 m (5' 6\")     Wt Readings from Last 5 Encounters:   02/21/24 85.6 kg (188 lb 12.8 oz)   01/19/24 87.1 kg (192 lb 1.6 oz)   10/26/23 85 kg (187 lb 6.3 oz)   08/21/23 85.8 kg (189 lb 1 oz)   03/13/23 87.3 kg (192 lb 8 oz)     Body mass index is 30.47 kg/m².   Obvious facial swelling right arm swelling,  Regular rhythm.  Clear lungs.  Soft abdomen.  Good close overall right femoral access.       Last Labs:  CMP:  Recent Labs     05/17/23  1205 04/04/23  1136 11/16/22  1240   * 135* 136   K 4.3 3.8 3.7   CL 89* 91* 93*   CO2 29 26 29   ANIONGAP 16 22* 18   BUN 37* 42* 31*   CREATININE 10.10* 12.43* 11.29*   GLUCOSE 181* 76 133*     Recent Labs     11/16/22  1240   ALBUMIN 4.3   ALKPHOS 72   ALT 20   AST 16   BILITOT 0.9     CBC:  Recent Labs     05/17/23  1205 04/04/23  1136 11/16/22  1240   WBC 9.7 7.7 7.2   HGB 12.4 12.9 14.3   HCT 35.2* 36.8 39.7    167 171   MCV 88 88 91     COAG:   Recent Labs     04/04/23  1136   INR 1.0     HEME/ENDO:  Recent Labs     11/16/22  1240 03/15/22  0902 12/30/20  1115 11/11/20  1531   HGBA1C 5.8* 6.4* 5.4 5.2      CARDIAC: No results for input(s): \"LDH\", \"CKMB\", \"TROPHS\", \"BNP\" in the last 55129 hours.    No lab exists for component: \"CK\", \"CKMBP\"No results for input(s): \"CHOL\", \"LDLF\", \"HDL\", \"TRIG\" in the last 60559 hours.    Last " Cardiology Tests:  ECG:      Echo:  Echo Results:  Transthoracic Echo (TTE) Complete With Contrast 01/19/2024    Narrative  TRANSTHORACIC ECHOCARDIOGRAM REPORT      Patient Name:      FERNANDOJUAN CARLOS HERNANDEZ   Reading Physician:    [id number] Solomon Doe MD  Study Date:        1/19/2024            Ordering Provider:    [id number] ETIENNE HU  MRN/PID:           [id number]             Fellow:  Accession#:        [id number]         Nurse:                Cindi Ruff RN  Date of Birth/Age: 1970 / 53 years Sonographer:          VIOLETA Jacobson RDCS  Gender:            F                    Additional Staff:  Height:            170.18 cm            Admit Date:  Weight:            87.09 kg             Admission Status:     Outpatient  BSA:               1.99 m2              Encounter#:           [id number]  Department Location:  [facility] Non  Invasive  Blood Pressure: 140 /74 mmHg    Study Type:    TRANSTHORACIC ECHO (TTE) COMPLETE  Diagnosis/ICD: Encounter for preprocedural cardiovascular examination-Z01.810  Indication:    Pre-kidney transplant evaluation  CPT Code:      Echo Complete w Full Doppler-16013    Patient History:  Drug Allergies:    None  Pertinent History: ESRD s/p tx , DMII, Htn, HLD, failed tx w/ current HD.    Study Detail: The following Echo studies were performed: 2D, M-Mode, Doppler and  color flow. Image quality for this study is less than ideal.  Definity used as a contrast agent for endocardial border  definition. Total contrast used for this procedure was 3 mL via IV  push. The patient was awake.      PHYSICIAN INTERPRETATION:  Left Ventricle: The left ventricular systolic function is normal, with an estimated ejection fraction of 65%. There are no regional wall motion abnormalities. The left ventricular cavity size is normal. Spectral Doppler shows a normal pattern of left ventricular diastolic filling.  Left Atrium: The left atrium is normal in size.  Right Ventricle: The right ventricle  is normal in size. There is normal right ventricular global systolic function.  Right Atrium: The right atrium is normal in size.  Aortic Valve: The aortic valve is trileaflet. There is no evidence of aortic valve regurgitation. The peak instantaneous gradient of the aortic valve is 4.2 mmHg.  Mitral Valve: The mitral valve is normal in structure. There is trace mitral valve regurgitation.  Tricuspid Valve: The tricuspid valve is structurally normal. There is trace to mild tricuspid regurgitation. The Doppler estimated RVSP is within normal limits at 26.1 mmHg.  Pulmonic Valve: The pulmonic valve is structurally normal. There is trace pulmonic valve regurgitation.  Pericardium: There is no pericardial effusion noted.  Aorta: The aortic root is normal.  In comparison to the previous echocardiogram(s): Compared with study from 11/16/2022, no significant change.      CONCLUSIONS:  1. Left ventricular systolic function is normal with a 65% estimated ejection fraction.  2. RVSP within normal limits.    QUANTITATIVE DATA SUMMARY:  2D MEASUREMENTS:  Normal Ranges:  LAs:           3.40 cm   (2.7-4.0cm)  RVIDd:         1.80 cm   (0.9-3.6cm)  IVSd:          1.13 cm   (0.6-1.1cm)  LVPWd:         1.05 cm   (0.6-1.1cm)  LVIDd:         3.96 cm   (3.9-5.9cm)  LVIDs:         2.52 cm  LV Mass Index: 71.1 g/m2  LV % FS        36.3 %    LA VOLUME:  Normal Ranges:  LA Vol A4C:        47.6 ml    (22+/-6mL/m2)  LA Vol A2C:        52.4 ml  LA Vol BP:         52.2 ml  LA Vol Index A4C:  24.0ml/m2  LA Vol Index A2C:  26.4 ml/m2  LA Vol Index BP:   26.3 ml/m2  LA Area A4C:       15.7 cm2  LA Area A2C:       17.2 cm2  LA Major Axis A4C: 4.4 cm  LA Major Axis A2C: 4.8 cm    RA VOLUME BY A/L METHOD:  Normal Ranges:  RA Vol A4C:        26.3 ml    (8.3-19.5ml)  RA Vol Index A4C:  13.2 ml/m2  RA Area A4C:       11.4 cm2  RA Major Axis A4C: 4.2 cm    AORTA MEASUREMENTS:  Normal Ranges:  AoV Meghan,s: 3.00 cm (1.4-2.6cm)  Asc Ao, d: 2.40 cm  (2.1-3.4cm)    LV SYSTOLIC FUNCTION BY 2D PLANIMETRY (MOD):  Normal Ranges:  EF-A4C View: 68.3 % (>=55%)  EF-A2C View: 65.3 %  EF-Biplane:  66.1 %    LV DIASTOLIC FUNCTION:  Normal Ranges:  MV Peak E:        0.74 m/s   (0.7-1.2 m/s)  MV Peak A:        0.68 m/s   (0.42-0.7 m/s)  E/A Ratio:        1.08       (1.0-2.2)  MV e'             0.07 m/s   (>8.0)  MV lateral e'     0.07 m/s  MV medial e'      0.06 m/s  E/e' Ratio:       10.58      (<8.0)  a'                0.10 m/s  PulmV Sys Jaiden:    52.72 cm/s  PulmV Brown Jaiden:   41.06 cm/s  PulmV S/D Jaiden:    1.28  PulmV A Revs Jaiden: 33.40 cm/s  PulmV A Revs Dur: 99.19 msec    MITRAL VALVE:  Normal Ranges:  MV DT: 152 msec (150-240msec)    AORTIC VALVE:  Normal Ranges:  AoV Vmax:      1.03 m/s (<=1.7m/s)  AoV Peak P.2 mmHg (<20mmHg)  LVOT Max Jaiden:  0.91 m/s (<=1.1m/s)  LVOT VTI:      22.32 cm  LVOT Diameter: 2.34 cm  (1.8-2.4cm)  AoV Area,Vmax: 3.81 cm2 (2.5-4.5cm2)      RIGHT VENTRICLE:  RV Basal 3.40 cm  RV Mid   1.80 cm  RV Major 6.8 cm  TAPSE:   20.0 mm  RV s'    0.10 m/s    TRICUSPID VALVE/RVSP:  Normal Ranges:  Peak TR Velocity: 2.40 m/s  RV Syst Pressure: 26.1 mmHg (< 30mmHg)    PULMONIC VALVE:  Normal Ranges:  PV Max Jaiden: 0.9 m/s  (0.6-0.9m/s)  PV Max PG:  3.0 mmHg    Pulmonary Veins:  PulmV A Revs Dur: 99.19 msec  PulmV A Revs Jaiden: 33.40 cm/s  PulmV Brown Jaiden:   41.06 cm/s  PulmV S/D Jaiden:    1.28  PulmV Sys Jaiden:    52.72 cm/s    AORTA:  Asc Ao Diam 2.45 cm      [id number] Solomon Doe MD  Electronically signed on 2024 at 1:05:52 PM        ** Final **       Cath:      Stress Test:  Stress Results:  No results found for this or any previous visit from the past 365 days.         Cardiac Imaging:  ECG 12 Lead  Normal sinus rhythm  Prolonged QT  Abnormal ECG  When compared with ECG of 21-AUG-2023 11:09,  Nonspecific T wave abnormality now evident in Inferior leads  Confirmed by Boris Larry ([id number]) on 2024 9:47:06 AM        Assessment/Plan       End-stage renal  disease with type 2 diabetes active transplant evaluation.  Placed referral to endocrinology try to get her off of sulfonylurea.  Renewed prescription for metoprolol.  Continue other meds as prescribed.  Acceptable CV risk for renal transplant.  Office follow-up 6 months    Orders:  No orders of the defined types were placed in this encounter.     Followup Appts:  No future appointments.        ____________________________________________________________  Kenny Barrett MD    Senior Attending Physician  [facility]  [facility]

## 2024-02-22 ENCOUNTER — LAB REQUISITION (OUTPATIENT)
Dept: LAB | Facility: CLINIC | Age: 54
End: 2024-02-22
Payer: COMMERCIAL

## 2024-02-22 DIAGNOSIS — N18.6 END STAGE RENAL DISEASE (MULTI): ICD-10-CM

## 2024-02-22 LAB — FREEZE CROSSMATCH: NORMAL

## 2024-03-12 PROCEDURE — 86832 HLA CLASS I HIGH DEFIN QUAL: CPT | Mod: OUT | Performed by: SURGERY

## 2024-03-25 ENCOUNTER — LAB REQUISITION (OUTPATIENT)
Dept: LAB | Facility: CLINIC | Age: 54
End: 2024-03-25
Payer: COMMERCIAL

## 2024-03-25 DIAGNOSIS — N18.6 END STAGE RENAL DISEASE (MULTI): ICD-10-CM

## 2024-03-25 LAB
HLA RESULTS: NORMAL
HLA-A+B+C AB NFR SER: NORMAL %
HLA-DP+DQ+DR AB NFR SER: NORMAL %

## 2024-04-24 ENCOUNTER — TELEPHONE (OUTPATIENT)
Dept: TRANSPLANT | Facility: HOSPITAL | Age: 54
End: 2024-04-24
Payer: COMMERCIAL

## 2024-04-24 NOTE — TELEPHONE ENCOUNTER
Called and spoke to patient to remind them to update monthly HLA sample. Patient was confused about the process and asked to be transferred over to [facility]. Transferred patients call to pre kidney department.

## 2024-04-30 ENCOUNTER — TELEPHONE (OUTPATIENT)
Dept: TRANSPLANT | Facility: HOSPITAL | Age: 54
End: 2024-04-30
Payer: COMMERCIAL

## 2024-06-03 PROCEDURE — 86832 HLA CLASS I HIGH DEFIN QUAL: CPT | Mod: OUT | Performed by: SURGERY

## 2024-06-14 ENCOUNTER — LAB REQUISITION (OUTPATIENT)
Dept: LAB | Facility: CLINIC | Age: 54
End: 2024-06-14
Payer: COMMERCIAL

## 2024-06-14 DIAGNOSIS — N18.6 END STAGE RENAL DISEASE (MULTI): ICD-10-CM

## 2024-06-14 LAB
HLA RESULTS: NORMAL
HLA-A+B+C AB NFR SER: NORMAL %
HLA-DP+DQ+DR AB NFR SER: NORMAL %

## 2024-07-10 ENCOUNTER — TELEPHONE (OUTPATIENT)
Dept: TRANSPLANT | Facility: HOSPITAL | Age: 54
End: 2024-07-10

## 2024-07-10 NOTE — TELEPHONE ENCOUNTER
Called and spoke to patient to remind them to update monthly HLA sample at either an approved  lab or dialysis unit, if applicable.  Patient verbalized an understanding and said she completed lab work on 7/10/24.  Coordinator notified.

## 2024-07-12 PROCEDURE — 86808 CYTOTOXIC ANTIBODY SCREENING: CPT | Mod: OUT | Performed by: SURGERY

## 2024-07-30 ENCOUNTER — LAB REQUISITION (OUTPATIENT)
Dept: LAB | Facility: CLINIC | Age: 54
End: 2024-07-30
Payer: COMMERCIAL

## 2024-07-30 DIAGNOSIS — N18.6 END STAGE RENAL DISEASE (MULTI): ICD-10-CM

## 2024-07-31 LAB — FREEZE CROSSMATCH: NORMAL

## 2024-08-08 PROCEDURE — 86808 CYTOTOXIC ANTIBODY SCREENING: CPT | Mod: OUT | Performed by: SURGERY

## 2024-08-21 ENCOUNTER — APPOINTMENT (OUTPATIENT)
Dept: CARDIOLOGY | Facility: CLINIC | Age: 54
End: 2024-08-21
Payer: COMMERCIAL

## 2024-08-29 ENCOUNTER — LAB REQUISITION (OUTPATIENT)
Dept: LAB | Facility: CLINIC | Age: 54
End: 2024-08-29
Payer: COMMERCIAL

## 2024-08-29 DIAGNOSIS — N18.6 END STAGE RENAL DISEASE (MULTI): ICD-10-CM

## 2024-08-29 LAB — FREEZE CROSSMATCH: NORMAL

## 2024-09-06 PROCEDURE — 86833 HLA CLASS II HIGH DEFIN QUAL: CPT | Mod: OUT | Performed by: SURGERY

## 2024-09-13 ENCOUNTER — OFFICE VISIT (OUTPATIENT)
Dept: CARDIOLOGY | Facility: CLINIC | Age: 54
End: 2024-09-13
Payer: COMMERCIAL

## 2024-09-13 VITALS
HEIGHT: 67 IN | WEIGHT: 187.25 LBS | OXYGEN SATURATION: 96 % | BODY MASS INDEX: 29.39 KG/M2 | SYSTOLIC BLOOD PRESSURE: 136 MMHG | HEART RATE: 84 BPM | DIASTOLIC BLOOD PRESSURE: 87 MMHG

## 2024-09-13 DIAGNOSIS — I87.1 SUPERIOR VENA CAVA COMPRESSION SYNDROME: Primary | ICD-10-CM

## 2024-09-13 PROCEDURE — 99214 OFFICE O/P EST MOD 30 MIN: CPT | Performed by: INTERNAL MEDICINE

## 2024-09-13 PROCEDURE — 3075F SYST BP GE 130 - 139MM HG: CPT | Performed by: INTERNAL MEDICINE

## 2024-09-13 PROCEDURE — 3079F DIAST BP 80-89 MM HG: CPT | Performed by: INTERNAL MEDICINE

## 2024-09-13 PROCEDURE — 1036F TOBACCO NON-USER: CPT | Performed by: INTERNAL MEDICINE

## 2024-09-13 PROCEDURE — 3008F BODY MASS INDEX DOCD: CPT | Performed by: INTERNAL MEDICINE

## 2024-09-13 ASSESSMENT — ENCOUNTER SYMPTOMS
DEPRESSION: 0
OCCASIONAL FEELINGS OF UNSTEADINESS: 0
LOSS OF SENSATION IN FEET: 1

## 2024-09-13 ASSESSMENT — PAIN SCALES - GENERAL: PAINLEVEL: 5

## 2024-09-13 NOTE — PROGRESS NOTES
Primary Care Physician: Perfecto Mcghee MD  Date of Visit: 09/13/2024  2:20 PM EDT  Location of visit: [facility] [address]     Chief Complaint:   Chief Complaint   Patient presents with    Follow-up    Hyperlipidemia    Hypertension    Coronary Artery Disease        HPI / Summary:   Rody Espinosa is a 53 y.o. female presents for followup.     2006 RCA stent( unrelenting back reyna,n jaw pain)  DDKT 2008  History of DVT on warfarin.  Cath in May 2023 patent RCA stents mild left system disease echocardiogram January 2024 normal EF no appreciable significant valvular issues   and mother had CVA they are in NH  Feels under a lot of stress.  Right groin access didn't function today.  Chest flutters when laying in bed, and felt like someone standing on chest  Just a minute comes and goes  Feels better deep breathing and sitting up.  Swollen left arm and face  Numbness and tingline legs and arms.    Specialty Problems          Cardiology Problems    MI (myocardial infarction) (Multi)     Last Assessment & Plan: Formatting of this note might be different from the original. Assessment: History of MI with 2 Cardiac stents. Takes Daily Aspirin. Denies any current CP, Heart Palpitations or SOB.         Superior vena cava compression syndrome    AV fistula stenosis (CMS-HCC)    Hypotension    Dialysis AV fistula malfunction (CMS-HCC)    AVF (arteriovenous fistula) (CMS-HCC)    Benign essential hypertension    Coronary artery disease involving native coronary artery of native heart without angina pectoris    Essential hypertension     Last Assessment & Plan: Formatting of this note might be different from the original. 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[facility])     Streptococcal meningitis          Past Surgical History:   Procedure Laterality Date    HYSTEROSCOPY  05/15/2013    Hysteroscopy With Endometrial Ablation    OTHER SURGICAL HISTORY  05/15/2013    Renal Transplant    OTHER SURGICAL HISTORY  05/15/2013    Parathyroid Resection Sub-Total Parathyroidectomy    OTHER SURGICAL HISTORY  05/15/2013    Parathyroid Surgery    OTHER SURGICAL HISTORY  05/15/2013    Repair Of Brow Ptosis Right Upper Eyelid    OTHER SURGICAL HISTORY  05/15/2013    Cardiac Cath Lesion 1, 1st Adjunct Treat Device: Stent    OTHER SURGICAL HISTORY  05/15/2013    Biopsy Renal    OTHER SURGICAL HISTORY  10/25/2018    Hemodialysis Access Type Arteriovenous Graft Right Thigh    OTHER SURGICAL HISTORY  10/25/2018    Hemodialysis Access Type Arteriovenous Fistula Left Arm    OTHER SURGICAL HISTORY  10/22/2014    Percutaneous Transluminal Angioplasty (Non-Coronary)          Social History:   reports that she has never smoked. She has never used smokeless tobacco. She reports current alcohol use. She reports that she does not use drugs.      Allergies:  Allergies   Allergen Reactions    Betadine [Povidone-Iodine] Unknown    Iodinated Contrast Media Itching    Adhesive Tape-Silicones Rash    Iodine Rash     Mild rash       Outpatient Medications:  Current Outpatient Medications   Medication Instructions    acetaminophen (TYLENOL) 650 mg, oral, Every 4 hours PRN    atorvastatin (LIPITOR) 40 mg, oral, Nightly    calcitriol (Rocaltrol) 0.5 mcg capsule oral    cetirizine (ZYRTEC) 5 mg, oral, Daily RT    clobetasol (Temovate) 0.05 % ointment Apply to itchy spot on back twice daily five days a week     "diphenhydrAMINE (Benadryl Allergy) 25 mg tablet oral, Nightly PRN    docusate sodium (COLACE) 100 mg, oral, 2 times daily    glipiZIDE (GLUCOTROL) 10 mg, oral, 2 times daily    ketoconazole (NIZOral) 2 % shampoo WASH SCALP ONCE EVERY OTHER WEEK  LEAVE ON FOR 15 MINUTES THEN RINSE    metoprolol succinate XL (TOPROL-XL) 25 mg, oral, Daily    multivitamin tablet 1 tablet, oral, Daily    Nephron FA 66 mg iron- 1,000 mcg tablet 1 tablet, oral, Daily    omeprazole (PRILOSEC) 40 mg, oral, 2 times daily    ondansetron (Zofran) 4 mg tablet oral, Every 8 hours PRN    sevelamer carbonate (RENVELA) 1,600 mg, oral, 3 times daily (morning, midday, late afternoon)    sodium bicarbonate 325 mg tablet 3 tablet po daily       ROS     Physical Exam:  Vitals:    09/13/24 1430   BP: 136/87   BP Location: Left leg   Patient Position: Sitting   BP Cuff Size: Large adult   Pulse: 84   SpO2: 96%   Weight: 84.9 kg (187 lb 4 oz)   Height: 1.689 m (5' 6.5\")     Wt Readings from Last 5 Encounters:   09/13/24 84.9 kg (187 lb 4 oz)   02/21/24 85.6 kg (188 lb 12.8 oz)   01/19/24 87.1 kg (192 lb 1.6 oz)   10/26/23 85 kg (187 lb 6.3 oz)   08/21/23 85.8 kg (189 lb 1 oz)     Body mass index is 29.77 kg/m².   Facial edema particularly around the orbits.  Difficult to  neck veins.  Rhythm was regular.  Lungs were clear.  Abdominal soft.  Felt as if she had good flows over her right leg AV fistula leg was warm     Last Labs:  CMP:  Recent Labs     05/17/23  1205 04/04/23  1136 11/16/22  1240   * 135* 136   K 4.3 3.8 3.7   CL 89* 91* 93*   CO2 29 26 29   ANIONGAP 16 22* 18   BUN 37* 42* 31*   CREATININE 10.10* 12.43* 11.29*   GLUCOSE 181* 76 133*     Recent Labs     11/16/22  1240   ALBUMIN 4.3   ALKPHOS 72   ALT 20   AST 16   BILITOT 0.9     CBC:  Recent Labs     05/17/23  1205 04/04/23  1136 11/16/22  1240   WBC 9.7 7.7 7.2   HGB 12.4 12.9 14.3   HCT 35.2* 36.8 39.7    167 171   MCV 88 88 91     COAG:   Recent Labs     04/04/23  1136 " "  INR 1.0     HEME/ENDO:  Recent Labs     11/16/22  1240 03/15/22  0902 12/30/20  1115 11/11/20  1531   HGBA1C 5.8* 6.4* 5.4 5.2      CARDIAC: No results for input(s): \"LDH\", \"CKMB\", \"TROPHS\", \"BNP\" in the last 52525 hours.    No lab exists for component: \"CK\", \"CKMBP\"No results for input(s): \"CHOL\", \"LDLF\", \"HDL\", \"TRIG\" in the last 39228 hours.    Last Cardiology Tests:  ECG:      Echo:  Echo Results:  Transthoracic Echo (TTE) Complete With Contrast 01/19/2024    Narrative  TRANSTHORACIC ECHOCARDIOGRAM REPORT      Patient Name:      FERNANDO CRUZ MARY   Reading Physician:    [id number] Solomon Doe MD  Study Date:        1/19/2024            Ordering Provider:    [id number] ETIENNE HU  MRN/PID:           [id number]             Fellow:  Accession#:        [id number]         Nurse:                Cindi Ruff RN  Date of Birth/Age: 1970 / 53 years Sonographer:          VIOLETA Jacobson RDCS  Gender:            F                    Additional Staff:  Height:            170.18 cm            Admit Date:  Weight:            87.09 kg             Admission Status:     Outpatient  BSA:               1.99 m2              Encounter#:           [id number]  Department Location:  [facility] Non  Invasive  Blood Pressure: 140 /74 mmHg    Study Type:    TRANSTHORACIC ECHO (TTE) COMPLETE  Diagnosis/ICD: Encounter for preprocedural cardiovascular examination-Z01.810  Indication:    Pre-kidney transplant evaluation  CPT Code:      Echo Complete w Full Doppler-46399    Patient History:  Drug Allergies:    None  Pertinent History: ESRD s/p tx , DMII, Htn, HLD, failed tx w/ current HD.    Study Detail: The following Echo studies were performed: 2D, M-Mode, Doppler and  color flow. Image quality for this study is less than ideal.  Definity used as a contrast agent for endocardial border  definition. Total contrast used for this procedure was 3 mL via IV  push. The patient was awake.      PHYSICIAN INTERPRETATION:  Left " Ventricle: The left ventricular systolic function is normal, with an estimated ejection fraction of 65%. There are no regional wall motion abnormalities. The left ventricular cavity size is normal. Spectral Doppler shows a normal pattern of left ventricular diastolic filling.  Left Atrium: The left atrium is normal in size.  Right Ventricle: The right ventricle is normal in size. There is normal right ventricular global systolic function.  Right Atrium: The right atrium is normal in size.  Aortic Valve: The aortic valve is trileaflet. There is no evidence of aortic valve regurgitation. The peak instantaneous gradient of the aortic valve is 4.2 mmHg.  Mitral Valve: The mitral valve is normal in structure. There is trace mitral valve regurgitation.  Tricuspid Valve: The tricuspid valve is structurally normal. There is trace to mild tricuspid regurgitation. The Doppler estimated RVSP is within normal limits at 26.1 mmHg.  Pulmonic Valve: The pulmonic valve is structurally normal. There is trace pulmonic valve regurgitation.  Pericardium: There is no pericardial effusion noted.  Aorta: The aortic root is normal.  In comparison to the previous echocardiogram(s): Compared with study from 11/16/2022, no significant change.      CONCLUSIONS:  1. Left ventricular systolic function is normal with a 65% estimated ejection fraction.  2. RVSP within normal limits.    QUANTITATIVE DATA SUMMARY:  2D MEASUREMENTS:  Normal Ranges:  LAs:           3.40 cm   (2.7-4.0cm)  RVIDd:         1.80 cm   (0.9-3.6cm)  IVSd:          1.13 cm   (0.6-1.1cm)  LVPWd:         1.05 cm   (0.6-1.1cm)  LVIDd:         3.96 cm   (3.9-5.9cm)  LVIDs:         2.52 cm  LV Mass Index: 71.1 g/m2  LV % FS        36.3 %    LA VOLUME:  Normal Ranges:  LA Vol A4C:        47.6 ml    (22+/-6mL/m2)  LA Vol A2C:        52.4 ml  LA Vol BP:         52.2 ml  LA Vol Index A4C:  24.0ml/m2  LA Vol Index A2C:  26.4 ml/m2  LA Vol Index BP:   26.3 ml/m2  LA Area A4C:       15.7  cm2  LA Area A2C:       17.2 cm2  LA Major Axis A4C: 4.4 cm  LA Major Axis A2C: 4.8 cm    RA VOLUME BY A/L METHOD:  Normal Ranges:  RA Vol A4C:        26.3 ml    (8.3-19.5ml)  RA Vol Index A4C:  13.2 ml/m2  RA Area A4C:       11.4 cm2  RA Major Axis A4C: 4.2 cm    AORTA MEASUREMENTS:  Normal Ranges:  AoV Meghan,s: 3.00 cm (1.4-2.6cm)  Asc Ao, d: 2.40 cm (2.1-3.4cm)    LV SYSTOLIC FUNCTION BY 2D PLANIMETRY (MOD):  Normal Ranges:  EF-A4C View: 68.3 % (>=55%)  EF-A2C View: 65.3 %  EF-Biplane:  66.1 %    LV DIASTOLIC FUNCTION:  Normal Ranges:  MV Peak E:        0.74 m/s   (0.7-1.2 m/s)  MV Peak A:        0.68 m/s   (0.42-0.7 m/s)  E/A Ratio:        1.08       (1.0-2.2)  MV e'             0.07 m/s   (>8.0)  MV lateral e'     0.07 m/s  MV medial e'      0.06 m/s  E/e' Ratio:       10.58      (<8.0)  a'                0.10 m/s  PulmV Sys Jaiden:    52.72 cm/s  PulmV Brown Jaiden:   41.06 cm/s  PulmV S/D Jaiden:    1.28  PulmV A Revs Jaiden: 33.40 cm/s  PulmV A Revs Dur: 99.19 msec    MITRAL VALVE:  Normal Ranges:  MV DT: 152 msec (150-240msec)    AORTIC VALVE:  Normal Ranges:  AoV Vmax:      1.03 m/s (<=1.7m/s)  AoV Peak P.2 mmHg (<20mmHg)  LVOT Max Jaiden:  0.91 m/s (<=1.1m/s)  LVOT VTI:      22.32 cm  LVOT Diameter: 2.34 cm  (1.8-2.4cm)  AoV Area,Vmax: 3.81 cm2 (2.5-4.5cm2)      RIGHT VENTRICLE:  RV Basal 3.40 cm  RV Mid   1.80 cm  RV Major 6.8 cm  TAPSE:   20.0 mm  RV s'    0.10 m/s    TRICUSPID VALVE/RVSP:  Normal Ranges:  Peak TR Velocity: 2.40 m/s  RV Syst Pressure: 26.1 mmHg (< 30mmHg)    PULMONIC VALVE:  Normal Ranges:  PV Max Jaiden: 0.9 m/s  (0.6-0.9m/s)  PV Max PG:  3.0 mmHg    Pulmonary Veins:  PulmV A Revs Dur: 99.19 msec  PulmV A Revs Jaiden: 33.40 cm/s  PulmV Brown Jaiden:   41.06 cm/s  PulmV S/D Jaiden:    1.28  PulmV Sys Jaiden:    52.72 cm/s    AORTA:  Asc Ao Diam 2.45 cm      [id number] Solomon Doe MD  Electronically signed on 2024 at 1:05:52 PM        ** Final **       Cath:      Stress Test:  Stress Results:  No results found for  this or any previous visit from the past 365 days.         Cardiac Imaging:  ECG 12 Lead  Normal sinus rhythm  Prolonged QT  Abnormal ECG  When compared with ECG of 21-AUG-2023 11:09,  Nonspecific T wave abnormality now evident in Inferior leads  Confirmed by Boris Larry ([id number]) on 2/14/2024 9:47:06 AM        Assessment/Plan       53-year-old female with end-stage renal disease, status post failed renal transplant, RCA stent in the setting of what sounds like ACS in 2006, normal EF, multiple other medical problems.  I do not think her current chest pain symptoms are strongly suggestive of angina.  I suggested that she decrease the frequency of follow-up I will see her again in 1 year.  If she is once again listed for transplant I would estimate her cardiovascular risk is being excepted    Orders:  No orders of the defined types were placed in this encounter.     Followup Appts:  No future appointments.        ____________________________________________________________  Kenny Barrett MD    Senior Attending Physician  [facility] [place]  [facility]

## 2024-09-16 ENCOUNTER — DOCUMENTATION (OUTPATIENT)
Dept: TRANSPLANT | Facility: HOSPITAL | Age: 54
End: 2024-09-16
Payer: COMMERCIAL

## 2024-09-16 NOTE — PROGRESS NOTES
Per letter from [facility] transplant; approved kidney txp 09/09/24 to 09/13/24. REF # [id number].

## 2024-09-19 ENCOUNTER — LAB REQUISITION (OUTPATIENT)
Dept: LAB | Facility: CLINIC | Age: 54
End: 2024-09-19
Payer: COMMERCIAL

## 2024-09-19 DIAGNOSIS — N18.6 END STAGE RENAL DISEASE (MULTI): ICD-10-CM

## 2024-10-02 PROCEDURE — 86808 CYTOTOXIC ANTIBODY SCREENING: CPT | Mod: OUT | Performed by: SURGERY

## 2024-10-29 ENCOUNTER — LAB REQUISITION (OUTPATIENT)
Dept: LAB | Facility: CLINIC | Age: 54
End: 2024-10-29
Payer: COMMERCIAL

## 2024-10-29 DIAGNOSIS — N18.6 END STAGE RENAL DISEASE (MULTI): ICD-10-CM

## 2024-10-30 LAB — FREEZE CROSSMATCH: NORMAL

## 2024-11-06 PROCEDURE — 86808 CYTOTOXIC ANTIBODY SCREENING: CPT | Mod: OUT | Performed by: SURGERY

## 2024-11-12 LAB
HLA RESULTS: NORMAL
HLA-A+B+C AB NFR SER: NORMAL %
HLA-DP+DQ+DR AB NFR SER: NORMAL %

## 2024-11-26 ENCOUNTER — DOCUMENTATION (OUTPATIENT)
Dept: TRANSPLANT | Facility: HOSPITAL | Age: 54
End: 2024-11-26
Payer: COMMERCIAL

## 2024-11-26 NOTE — PROGRESS NOTES
Lab Results   Component Value Date    HEPBSAB 31.1 11/16/2022       There is no immunization history for the selected administration types on file for this patient.  Tasked SA to obtain immunization record.

## 2024-11-27 ENCOUNTER — LAB REQUISITION (OUTPATIENT)
Dept: LAB | Facility: CLINIC | Age: 54
End: 2024-11-27
Payer: COMMERCIAL

## 2024-11-27 DIAGNOSIS — N18.6 END STAGE RENAL DISEASE (MULTI): ICD-10-CM

## 2024-11-27 LAB — FREEZE CROSSMATCH: NORMAL

## 2024-11-29 ENCOUNTER — HOSPITAL ENCOUNTER (INPATIENT)
Facility: HOSPITAL | Age: 54
End: 2024-11-29
Attending: EMERGENCY MEDICINE | Admitting: INTERNAL MEDICINE
Payer: COMMERCIAL

## 2024-11-29 ENCOUNTER — APPOINTMENT (OUTPATIENT)
Dept: RADIOLOGY | Facility: HOSPITAL | Age: 54
End: 2024-11-29
Payer: COMMERCIAL

## 2024-11-29 DIAGNOSIS — M86.9 OSTEOMYELITIS OF RIGHT FOOT, UNSPECIFIED TYPE (MULTI): Primary | ICD-10-CM

## 2024-11-29 DIAGNOSIS — Z99.2 ESRD ON HEMODIALYSIS (MULTI): ICD-10-CM

## 2024-11-29 DIAGNOSIS — N18.6 ESRD ON HEMODIALYSIS (MULTI): ICD-10-CM

## 2024-11-29 PROBLEM — M79.604 LEG PAIN, ANTERIOR, RIGHT: Status: ACTIVE | Noted: 2024-11-29

## 2024-11-29 PROBLEM — T82.898A: Status: ACTIVE | Noted: 2024-05-06

## 2024-11-29 PROBLEM — L97.519 DIABETIC ULCER OF TOE OF RIGHT FOOT ASSOCIATED WITH TYPE 2 DIABETES MELLITUS (MULTI): Status: ACTIVE | Noted: 2024-11-29

## 2024-11-29 PROBLEM — E11.621 DIABETIC ULCER OF TOE OF RIGHT FOOT ASSOCIATED WITH TYPE 2 DIABETES MELLITUS (MULTI): Status: ACTIVE | Noted: 2024-11-29

## 2024-11-29 PROBLEM — E11.42 CONTROLLED TYPE 2 DIABETES MELLITUS WITH DIABETIC POLYNEUROPATHY, WITHOUT LONG-TERM CURRENT USE OF INSULIN: Status: ACTIVE | Noted: 2024-11-29

## 2024-11-29 LAB
ALBUMIN SERPL BCP-MCNC: 4 G/DL (ref 3.4–5)
ALP SERPL-CCNC: 66 U/L (ref 33–110)
ALT SERPL W P-5'-P-CCNC: 8 U/L (ref 7–45)
ANION GAP SERPL CALC-SCNC: 16 MMOL/L (ref 10–20)
AST SERPL W P-5'-P-CCNC: 7 U/L (ref 9–39)
BASOPHILS # BLD AUTO: 0.08 X10*3/UL (ref 0–0.1)
BASOPHILS NFR BLD AUTO: 0.8 %
BILIRUB SERPL-MCNC: 0.6 MG/DL (ref 0–1.2)
BUN SERPL-MCNC: 20 MG/DL (ref 6–23)
CALCIUM SERPL-MCNC: 9.1 MG/DL (ref 8.6–10.3)
CHLORIDE SERPL-SCNC: 93 MMOL/L (ref 98–107)
CO2 SERPL-SCNC: 32 MMOL/L (ref 21–32)
CREAT SERPL-MCNC: 7.77 MG/DL (ref 0.5–1.05)
CRP SERPL-MCNC: 1.66 MG/DL
EGFRCR SERPLBLD CKD-EPI 2021: 6 ML/MIN/1.73M*2
EOSINOPHIL # BLD AUTO: 1.73 X10*3/UL (ref 0–0.7)
EOSINOPHIL NFR BLD AUTO: 16.8 %
ERYTHROCYTE [DISTWIDTH] IN BLOOD BY AUTOMATED COUNT: 14.5 % (ref 11.5–14.5)
ERYTHROCYTE [SEDIMENTATION RATE] IN BLOOD BY WESTERGREN METHOD: 124 MM/H (ref 0–30)
GLUCOSE BLD MANUAL STRIP-MCNC: 128 MG/DL (ref 74–99)
GLUCOSE SERPL-MCNC: 71 MG/DL (ref 74–99)
HCT VFR BLD AUTO: 33.4 % (ref 36–46)
HGB BLD-MCNC: 11.7 G/DL (ref 12–16)
IMM GRANULOCYTES # BLD AUTO: 0.02 X10*3/UL (ref 0–0.7)
IMM GRANULOCYTES NFR BLD AUTO: 0.2 % (ref 0–0.9)
LACTATE SERPL-SCNC: 1.5 MMOL/L (ref 0.4–2)
LYMPHOCYTES # BLD AUTO: 1.72 X10*3/UL (ref 1.2–4.8)
LYMPHOCYTES NFR BLD AUTO: 16.7 %
MCH RBC QN AUTO: 30.5 PG (ref 26–34)
MCHC RBC AUTO-ENTMCNC: 35 G/DL (ref 32–36)
MCV RBC AUTO: 87 FL (ref 80–100)
MONOCYTES # BLD AUTO: 1.09 X10*3/UL (ref 0.1–1)
MONOCYTES NFR BLD AUTO: 10.6 %
NEUTROPHILS # BLD AUTO: 5.66 X10*3/UL (ref 1.2–7.7)
NEUTROPHILS NFR BLD AUTO: 54.9 %
NRBC BLD-RTO: 0 /100 WBCS (ref 0–0)
PLATELET # BLD AUTO: 191 X10*3/UL (ref 150–450)
POTASSIUM SERPL-SCNC: 4.4 MMOL/L (ref 3.5–5.3)
PROT SERPL-MCNC: 9.2 G/DL (ref 6.4–8.2)
RBC # BLD AUTO: 3.83 X10*6/UL (ref 4–5.2)
SODIUM SERPL-SCNC: 137 MMOL/L (ref 136–145)
WBC # BLD AUTO: 10.3 X10*3/UL (ref 4.4–11.3)

## 2024-11-29 PROCEDURE — 83605 ASSAY OF LACTIC ACID: CPT

## 2024-11-29 PROCEDURE — 2500000004 HC RX 250 GENERAL PHARMACY W/ HCPCS (ALT 636 FOR OP/ED)

## 2024-11-29 PROCEDURE — 80053 COMPREHEN METABOLIC PANEL: CPT

## 2024-11-29 PROCEDURE — 2500000004 HC RX 250 GENERAL PHARMACY W/ HCPCS (ALT 636 FOR OP/ED): Performed by: EMERGENCY MEDICINE

## 2024-11-29 PROCEDURE — 2500000005 HC RX 250 GENERAL PHARMACY W/O HCPCS

## 2024-11-29 PROCEDURE — 87040 BLOOD CULTURE FOR BACTERIA: CPT | Mod: AHULAB

## 2024-11-29 PROCEDURE — 36415 COLL VENOUS BLD VENIPUNCTURE: CPT

## 2024-11-29 PROCEDURE — 96375 TX/PRO/DX INJ NEW DRUG ADDON: CPT

## 2024-11-29 PROCEDURE — 86140 C-REACTIVE PROTEIN: CPT

## 2024-11-29 PROCEDURE — 85652 RBC SED RATE AUTOMATED: CPT

## 2024-11-29 PROCEDURE — 99285 EMERGENCY DEPT VISIT HI MDM: CPT

## 2024-11-29 PROCEDURE — 2500000001 HC RX 250 WO HCPCS SELF ADMINISTERED DRUGS (ALT 637 FOR MEDICARE OP): Performed by: PHYSICIAN ASSISTANT

## 2024-11-29 PROCEDURE — 96365 THER/PROPH/DIAG IV INF INIT: CPT

## 2024-11-29 PROCEDURE — 85025 COMPLETE CBC W/AUTO DIFF WBC: CPT

## 2024-11-29 PROCEDURE — 82947 ASSAY GLUCOSE BLOOD QUANT: CPT

## 2024-11-29 PROCEDURE — 99223 1ST HOSP IP/OBS HIGH 75: CPT | Performed by: PHYSICIAN ASSISTANT

## 2024-11-29 PROCEDURE — 83036 HEMOGLOBIN GLYCOSYLATED A1C: CPT | Mod: AHULAB | Performed by: PHYSICIAN ASSISTANT

## 2024-11-29 PROCEDURE — 73630 X-RAY EXAM OF FOOT: CPT | Mod: RT

## 2024-11-29 PROCEDURE — 2500000001 HC RX 250 WO HCPCS SELF ADMINISTERED DRUGS (ALT 637 FOR MEDICARE OP): Performed by: EMERGENCY MEDICINE

## 2024-11-29 PROCEDURE — 2060000001 HC INTERMEDIATE ICU ROOM DAILY

## 2024-11-29 PROCEDURE — 73630 X-RAY EXAM OF FOOT: CPT | Mod: RIGHT SIDE | Performed by: RADIOLOGY

## 2024-11-29 RX ORDER — ATORVASTATIN CALCIUM 40 MG/1
40 TABLET, FILM COATED ORAL NIGHTLY
Status: DISPENSED | OUTPATIENT
Start: 2024-11-29

## 2024-11-29 RX ORDER — HYDROMORPHONE HYDROCHLORIDE 0.2 MG/ML
0.2 INJECTION INTRAMUSCULAR; INTRAVENOUS; SUBCUTANEOUS EVERY 4 HOURS PRN
Status: DISPENSED | OUTPATIENT
Start: 2024-11-29

## 2024-11-29 RX ORDER — PANTOPRAZOLE SODIUM 40 MG/1
40 TABLET, DELAYED RELEASE ORAL
Status: DISPENSED | OUTPATIENT
Start: 2024-11-30

## 2024-11-29 RX ORDER — SEVELAMER CARBONATE 800 MG/1
1600 TABLET, FILM COATED ORAL
Status: DISPENSED | OUTPATIENT
Start: 2024-11-30

## 2024-11-29 RX ORDER — TIMOLOL MALEATE 5 MG/ML
1 SOLUTION/ DROPS OPHTHALMIC 2 TIMES DAILY
Status: ON HOLD | COMMUNITY
Start: 2024-03-27

## 2024-11-29 RX ORDER — DEXTROSE 50 % IN WATER (D50W) INTRAVENOUS SYRINGE
12.5
Status: DISPENSED | OUTPATIENT
Start: 2024-11-29

## 2024-11-29 RX ORDER — PANTOPRAZOLE SODIUM 40 MG/10ML
40 INJECTION, POWDER, LYOPHILIZED, FOR SOLUTION INTRAVENOUS
Status: ACTIVE | OUTPATIENT
Start: 2024-11-30

## 2024-11-29 RX ORDER — HYDROMORPHONE HYDROCHLORIDE 0.2 MG/ML
0.2 INJECTION INTRAMUSCULAR; INTRAVENOUS; SUBCUTANEOUS ONCE
Status: COMPLETED | OUTPATIENT
Start: 2024-11-29 | End: 2024-11-29

## 2024-11-29 RX ORDER — ASPIRIN 81 MG/1
81 TABLET ORAL DAILY
Status: DISPENSED | OUTPATIENT
Start: 2024-11-30

## 2024-11-29 RX ORDER — VANCOMYCIN HYDROCHLORIDE 1 G/20ML
INJECTION, POWDER, LYOPHILIZED, FOR SOLUTION INTRAVENOUS DAILY PRN
Status: DISPENSED | OUTPATIENT
Start: 2024-11-29

## 2024-11-29 RX ORDER — PREGABALIN 100 MG/1
100 CAPSULE ORAL NIGHTLY
Status: DISPENSED | OUTPATIENT
Start: 2024-11-29

## 2024-11-29 RX ORDER — MIDODRINE HYDROCHLORIDE 5 MG/1
5 TABLET ORAL EVERY 8 HOURS
Status: DISCONTINUED | OUTPATIENT
Start: 2024-11-29 | End: 2024-11-30

## 2024-11-29 RX ORDER — DEXTROSE 50 % IN WATER (D50W) INTRAVENOUS SYRINGE
25
Status: DISPENSED | OUTPATIENT
Start: 2024-11-29

## 2024-11-29 RX ORDER — ASPIRIN 81 MG/1
81 TABLET ORAL
Status: ON HOLD | COMMUNITY
Start: 2024-05-09

## 2024-11-29 RX ORDER — DOCUSATE SODIUM 100 MG/1
100 CAPSULE, LIQUID FILLED ORAL 2 TIMES DAILY
Status: DISPENSED | OUTPATIENT
Start: 2024-11-29

## 2024-11-29 RX ORDER — POLYETHYLENE GLYCOL 3350 17 G/17G
17 POWDER, FOR SOLUTION ORAL DAILY
Status: DISPENSED | OUTPATIENT
Start: 2024-11-30

## 2024-11-29 RX ORDER — GLIPIZIDE 5 MG/1
10 TABLET ORAL 2 TIMES DAILY
Status: DISPENSED | OUTPATIENT
Start: 2024-11-30

## 2024-11-29 RX ORDER — INSULIN LISPRO 100 [IU]/ML
0-5 INJECTION, SOLUTION INTRAVENOUS; SUBCUTANEOUS
Status: DISPENSED | OUTPATIENT
Start: 2024-11-30

## 2024-11-29 RX ORDER — ACETAMINOPHEN 325 MG/1
650 TABLET ORAL EVERY 4 HOURS PRN
Status: DISPENSED | OUTPATIENT
Start: 2024-11-29

## 2024-11-29 RX ORDER — TALC
3 POWDER (GRAM) TOPICAL NIGHTLY PRN
Status: ACTIVE | OUTPATIENT
Start: 2024-11-29

## 2024-11-29 RX ORDER — ONDANSETRON 4 MG/1
4 TABLET, ORALLY DISINTEGRATING ORAL EVERY 8 HOURS PRN
Status: ACTIVE | OUTPATIENT
Start: 2024-11-29

## 2024-11-29 RX ORDER — HYDROCODONE BITARTRATE AND ACETAMINOPHEN 5; 325 MG/1; MG/1
1 TABLET ORAL ONCE
Status: COMPLETED | OUTPATIENT
Start: 2024-11-29 | End: 2024-11-29

## 2024-11-29 RX ORDER — METOPROLOL SUCCINATE 25 MG/1
25 TABLET, EXTENDED RELEASE ORAL DAILY
Status: DISPENSED | OUTPATIENT
Start: 2024-11-30

## 2024-11-29 RX ORDER — PREGABALIN 25 MG/1
100 CAPSULE ORAL NIGHTLY
Status: ON HOLD | COMMUNITY
Start: 2024-08-30

## 2024-11-29 RX ORDER — TIMOLOL MALEATE 5 MG/ML
1 SOLUTION/ DROPS OPHTHALMIC DAILY
Status: DISPENSED | OUTPATIENT
Start: 2024-11-30

## 2024-11-29 RX ORDER — ONDANSETRON HYDROCHLORIDE 2 MG/ML
4 INJECTION, SOLUTION INTRAVENOUS EVERY 8 HOURS PRN
Status: ACTIVE | OUTPATIENT
Start: 2024-11-29

## 2024-11-29 ASSESSMENT — PAIN SCALES - GENERAL
PAINLEVEL_OUTOF10: 10 - WORST POSSIBLE PAIN

## 2024-11-29 ASSESSMENT — PAIN DESCRIPTION - LOCATION
LOCATION: TOE (COMMENT WHICH ONE)
LOCATION: TOE (COMMENT WHICH ONE)

## 2024-11-29 ASSESSMENT — COLUMBIA-SUICIDE SEVERITY RATING SCALE - C-SSRS
2. HAVE YOU ACTUALLY HAD ANY THOUGHTS OF KILLING YOURSELF?: NO
1. IN THE PAST MONTH, HAVE YOU WISHED YOU WERE DEAD OR WISHED YOU COULD GO TO SLEEP AND NOT WAKE UP?: NO
6. HAVE YOU EVER DONE ANYTHING, STARTED TO DO ANYTHING, OR PREPARED TO DO ANYTHING TO END YOUR LIFE?: NO

## 2024-11-29 ASSESSMENT — PAIN - FUNCTIONAL ASSESSMENT: PAIN_FUNCTIONAL_ASSESSMENT: 0-10

## 2024-11-29 ASSESSMENT — PAIN DESCRIPTION - PAIN TYPE: TYPE: ACUTE PAIN

## 2024-11-29 NOTE — ED TRIAGE NOTES
TRIAGE NOTE   I saw the patient as the Clinician in Triage and performed a brief history and physical exam, established acuity, and ordered appropriate tests to develop basic plan of care. Patient will be seen by an GEN, resident and/or physician who will independently evaluate the patient. Please see subsequent provider notes for further details and disposition.     Brief HPI: In brief, Rody Espinosa is a 53 y.o. female that presents for right second toe infection.  Patient reports that for the past week or so she has increasing discharge and foul-smelling odor coming from the right second toe.  She reports she has had pain and ulcer on the area for years now.  No fevers.  No nausea or vomiting.  Does have diabetic neuropathy and diabetes.  No other symptoms or concerns at this time.    Focused Physical exam:   The right second digit has purulent drainage and is necrotic.  Has a diabetic ulcer and part of the toe is hanging off.  Right lower extremity is warm and moist and well-perfused.  Head atraumatic normocephalic.  Appears to be nontoxic.  Is tachycardic and has a temperature of 99.3.  Plan/MDM:   Initiating CBC, CMP, lactate, ESR, CRP, blood cultures, vancomycin, Zosyn, x-ray of the right foot.  Patient will be seen in the back of the ED for further evaluation and treatment.  I will not be following with this patient during her ED visit.  This is a preliminary evaluation during triage.    I evaluated this patient in triage with the RN. Due to the patients complaint labs and or imaging were ordered by myself in an attempt to expedite patient care however I am not participating in care after evaluation. This is a preliminary assessment. Pt does not appear in acute distress at this time. They will have a full evaluation as soon as possible. They will be cared for by another provider who will possibly order more labs, imaging or interventions. Pt did not have a full ROS or PE completed by myself however  below is a summary with reasons for orders.  For the remainder of the patient's workup and ED course, please refer to the main ED provider note. We discussed need for diagnostic testing including laboratory studies and imaging.  We also discussed that patient may be asked to wait in the waiting room while these tests are pending.  They understand that if they choose to leave without having the testing completed or resulted that we cannot rule out acute life threatening illnesses and the risks involved could lead to worsening condition, permanent disability or even death.     Please see subsequent provider note for further details and disposition

## 2024-11-29 NOTE — ED TRIAGE NOTES
Pt states her right second toe has a foul odor. Per pt she recently saw her podiatrist and he said her feet looked ok but today pt had pain and odor.

## 2024-11-30 LAB
ANION GAP SERPL CALC-SCNC: 15 MMOL/L (ref 10–20)
BUN SERPL-MCNC: 27 MG/DL (ref 6–23)
CALCIUM SERPL-MCNC: 9.3 MG/DL (ref 8.6–10.3)
CHLORIDE SERPL-SCNC: 96 MMOL/L (ref 98–107)
CO2 SERPL-SCNC: 30 MMOL/L (ref 21–32)
CREAT SERPL-MCNC: 9.77 MG/DL (ref 0.5–1.05)
EGFRCR SERPLBLD CKD-EPI 2021: 4 ML/MIN/1.73M*2
ERYTHROCYTE [DISTWIDTH] IN BLOOD BY AUTOMATED COUNT: 14.2 % (ref 11.5–14.5)
EST. AVERAGE GLUCOSE BLD GHB EST-MCNC: 117 MG/DL
GLUCOSE BLD MANUAL STRIP-MCNC: 159 MG/DL (ref 74–99)
GLUCOSE BLD MANUAL STRIP-MCNC: 47 MG/DL (ref 74–99)
GLUCOSE BLD MANUAL STRIP-MCNC: 51 MG/DL (ref 74–99)
GLUCOSE BLD MANUAL STRIP-MCNC: 55 MG/DL (ref 74–99)
GLUCOSE BLD MANUAL STRIP-MCNC: 66 MG/DL (ref 74–99)
GLUCOSE BLD MANUAL STRIP-MCNC: 80 MG/DL (ref 74–99)
GLUCOSE SERPL-MCNC: 62 MG/DL (ref 74–99)
HBA1C MFR BLD: 5.7 %
HCT VFR BLD AUTO: 28.6 % (ref 36–46)
HGB BLD-MCNC: 10.2 G/DL (ref 12–16)
MCH RBC QN AUTO: 31.4 PG (ref 26–34)
MCHC RBC AUTO-ENTMCNC: 35.7 G/DL (ref 32–36)
MCV RBC AUTO: 88 FL (ref 80–100)
NRBC BLD-RTO: 0 /100 WBCS (ref 0–0)
PLATELET # BLD AUTO: 157 X10*3/UL (ref 150–450)
POTASSIUM SERPL-SCNC: 4.7 MMOL/L (ref 3.5–5.3)
RBC # BLD AUTO: 3.25 X10*6/UL (ref 4–5.2)
SODIUM SERPL-SCNC: 136 MMOL/L (ref 136–145)
WBC # BLD AUTO: 9.7 X10*3/UL (ref 4.4–11.3)

## 2024-11-30 PROCEDURE — 2500000004 HC RX 250 GENERAL PHARMACY W/ HCPCS (ALT 636 FOR OP/ED): Performed by: PHYSICIAN ASSISTANT

## 2024-11-30 PROCEDURE — 82947 ASSAY GLUCOSE BLOOD QUANT: CPT

## 2024-11-30 PROCEDURE — 99233 SBSQ HOSP IP/OBS HIGH 50: CPT | Performed by: INTERNAL MEDICINE

## 2024-11-30 PROCEDURE — 2500000005 HC RX 250 GENERAL PHARMACY W/O HCPCS: Performed by: PHYSICIAN ASSISTANT

## 2024-11-30 PROCEDURE — 2500000001 HC RX 250 WO HCPCS SELF ADMINISTERED DRUGS (ALT 637 FOR MEDICARE OP): Performed by: PHYSICIAN ASSISTANT

## 2024-11-30 PROCEDURE — 85027 COMPLETE CBC AUTOMATED: CPT | Performed by: PHYSICIAN ASSISTANT

## 2024-11-30 PROCEDURE — 2060000001 HC INTERMEDIATE ICU ROOM DAILY

## 2024-11-30 PROCEDURE — 2500000004 HC RX 250 GENERAL PHARMACY W/ HCPCS (ALT 636 FOR OP/ED): Performed by: INTERNAL MEDICINE

## 2024-11-30 PROCEDURE — 82374 ASSAY BLOOD CARBON DIOXIDE: CPT | Performed by: PHYSICIAN ASSISTANT

## 2024-11-30 PROCEDURE — 2500000001 HC RX 250 WO HCPCS SELF ADMINISTERED DRUGS (ALT 637 FOR MEDICARE OP): Performed by: INTERNAL MEDICINE

## 2024-11-30 PROCEDURE — 36415 COLL VENOUS BLD VENIPUNCTURE: CPT | Performed by: PHYSICIAN ASSISTANT

## 2024-11-30 PROCEDURE — 80048 BASIC METABOLIC PNL TOTAL CA: CPT | Performed by: PHYSICIAN ASSISTANT

## 2024-11-30 PROCEDURE — 2500000002 HC RX 250 W HCPCS SELF ADMINISTERED DRUGS (ALT 637 FOR MEDICARE OP, ALT 636 FOR OP/ED): Performed by: PHYSICIAN ASSISTANT

## 2024-11-30 RX ORDER — HEPARIN SODIUM 5000 [USP'U]/ML
5000 INJECTION, SOLUTION INTRAVENOUS; SUBCUTANEOUS EVERY 8 HOURS
Status: DISPENSED | OUTPATIENT
Start: 2024-11-30

## 2024-11-30 RX ORDER — MIDODRINE HYDROCHLORIDE 5 MG/1
5 TABLET ORAL EVERY 8 HOURS
Status: DISPENSED | OUTPATIENT
Start: 2024-11-30

## 2024-11-30 SDOH — SOCIAL STABILITY: SOCIAL INSECURITY: ARE YOU OR HAVE YOU BEEN THREATENED OR ABUSED PHYSICALLY, EMOTIONALLY, OR SEXUALLY BY ANYONE?: NO

## 2024-11-30 SDOH — ECONOMIC STABILITY: HOUSING INSECURITY: AT ANY TIME IN THE PAST 12 MONTHS, WERE YOU HOMELESS OR LIVING IN A SHELTER (INCLUDING NOW)?: NO

## 2024-11-30 SDOH — SOCIAL STABILITY: SOCIAL INSECURITY: WERE YOU ABLE TO COMPLETE ALL THE BEHAVIORAL HEALTH SCREENINGS?: YES

## 2024-11-30 SDOH — ECONOMIC STABILITY: HOUSING INSECURITY: IN THE LAST 12 MONTHS, WAS THERE A TIME WHEN YOU WERE NOT ABLE TO PAY THE MORTGAGE OR RENT ON TIME?: NO

## 2024-11-30 SDOH — ECONOMIC STABILITY: FOOD INSECURITY: WITHIN THE PAST 12 MONTHS, THE FOOD YOU BOUGHT JUST DIDN'T LAST AND YOU DIDN'T HAVE MONEY TO GET MORE.: NEVER TRUE

## 2024-11-30 SDOH — ECONOMIC STABILITY: INCOME INSECURITY: IN THE PAST 12 MONTHS HAS THE ELECTRIC, GAS, OIL, OR WATER COMPANY THREATENED TO SHUT OFF SERVICES IN YOUR HOME?: NO

## 2024-11-30 SDOH — SOCIAL STABILITY: SOCIAL INSECURITY
WITHIN THE LAST YEAR, HAVE YOU BEEN KICKED, HIT, SLAPPED, OR OTHERWISE PHYSICALLY HURT BY YOUR PARTNER OR EX-PARTNER?: NO

## 2024-11-30 SDOH — SOCIAL STABILITY: SOCIAL INSECURITY: HAVE YOU HAD THOUGHTS OF HARMING ANYONE ELSE?: NO

## 2024-11-30 SDOH — ECONOMIC STABILITY: HOUSING INSECURITY: IN THE PAST 12 MONTHS, HOW MANY TIMES HAVE YOU MOVED WHERE YOU WERE LIVING?: 0

## 2024-11-30 SDOH — HEALTH STABILITY: PHYSICAL HEALTH: ON AVERAGE, HOW MANY MINUTES DO YOU ENGAGE IN EXERCISE AT THIS LEVEL?: 0 MIN

## 2024-11-30 SDOH — SOCIAL STABILITY: SOCIAL INSECURITY
WITHIN THE LAST YEAR, HAVE YOU BEEN RAPED OR FORCED TO HAVE ANY KIND OF SEXUAL ACTIVITY BY YOUR PARTNER OR EX-PARTNER?: NO

## 2024-11-30 SDOH — ECONOMIC STABILITY: FOOD INSECURITY: WITHIN THE PAST 12 MONTHS, YOU WORRIED THAT YOUR FOOD WOULD RUN OUT BEFORE YOU GOT THE MONEY TO BUY MORE.: NEVER TRUE

## 2024-11-30 SDOH — SOCIAL STABILITY: SOCIAL INSECURITY: WITHIN THE LAST YEAR, HAVE YOU BEEN AFRAID OF YOUR PARTNER OR EX-PARTNER?: NO

## 2024-11-30 SDOH — ECONOMIC STABILITY: TRANSPORTATION INSECURITY: IN THE PAST 12 MONTHS, HAS LACK OF TRANSPORTATION KEPT YOU FROM MEDICAL APPOINTMENTS OR FROM GETTING MEDICATIONS?: NO

## 2024-11-30 SDOH — ECONOMIC STABILITY: FOOD INSECURITY: HOW HARD IS IT FOR YOU TO PAY FOR THE VERY BASICS LIKE FOOD, HOUSING, MEDICAL CARE, AND HEATING?: NOT VERY HARD

## 2024-11-30 SDOH — SOCIAL STABILITY: SOCIAL INSECURITY: DO YOU FEEL UNSAFE GOING BACK TO THE PLACE WHERE YOU ARE LIVING?: NO

## 2024-11-30 SDOH — SOCIAL STABILITY: SOCIAL INSECURITY: WITHIN THE LAST YEAR, HAVE YOU BEEN HUMILIATED OR EMOTIONALLY ABUSED IN OTHER WAYS BY YOUR PARTNER OR EX-PARTNER?: NO

## 2024-11-30 SDOH — SOCIAL STABILITY: SOCIAL INSECURITY: DO YOU FEEL ANYONE HAS EXPLOITED OR TAKEN ADVANTAGE OF YOU FINANCIALLY OR OF YOUR PERSONAL PROPERTY?: NO

## 2024-11-30 SDOH — HEALTH STABILITY: MENTAL HEALTH
DO YOU FEEL STRESS - TENSE, RESTLESS, NERVOUS, OR ANXIOUS, OR UNABLE TO SLEEP AT NIGHT BECAUSE YOUR MIND IS TROUBLED ALL THE TIME - THESE DAYS?: NOT AT ALL

## 2024-11-30 SDOH — SOCIAL STABILITY: SOCIAL INSECURITY: ABUSE: ADULT

## 2024-11-30 SDOH — SOCIAL STABILITY: SOCIAL INSECURITY: ARE THERE ANY APPARENT SIGNS OF INJURIES/BEHAVIORS THAT COULD BE RELATED TO ABUSE/NEGLECT?: NO

## 2024-11-30 SDOH — HEALTH STABILITY: PHYSICAL HEALTH: ON AVERAGE, HOW MANY DAYS PER WEEK DO YOU ENGAGE IN MODERATE TO STRENUOUS EXERCISE (LIKE A BRISK WALK)?: 0 DAYS

## 2024-11-30 SDOH — SOCIAL STABILITY: SOCIAL INSECURITY: HAVE YOU HAD ANY THOUGHTS OF HARMING ANYONE ELSE?: NO

## 2024-11-30 SDOH — SOCIAL STABILITY: SOCIAL INSECURITY: HAS ANYONE EVER THREATENED TO HURT YOUR FAMILY OR YOUR PETS?: NO

## 2024-11-30 SDOH — SOCIAL STABILITY: SOCIAL INSECURITY: DOES ANYONE TRY TO KEEP YOU FROM HAVING/CONTACTING OTHER FRIENDS OR DOING THINGS OUTSIDE YOUR HOME?: NO

## 2024-11-30 ASSESSMENT — LIFESTYLE VARIABLES
AUDIT-C TOTAL SCORE: 0
HOW OFTEN DO YOU HAVE A DRINK CONTAINING ALCOHOL: NEVER
HOW OFTEN DO YOU HAVE 6 OR MORE DRINKS ON ONE OCCASION: NEVER
HOW MANY STANDARD DRINKS CONTAINING ALCOHOL DO YOU HAVE ON A TYPICAL DAY: PATIENT DOES NOT DRINK
SKIP TO QUESTIONS 9-10: 1
AUDIT-C TOTAL SCORE: 0

## 2024-11-30 ASSESSMENT — ACTIVITIES OF DAILY LIVING (ADL)
GROOMING: INDEPENDENT
HEARING - LEFT EAR: FUNCTIONAL
FEEDING YOURSELF: INDEPENDENT
HEARING - RIGHT EAR: FUNCTIONAL
DRESSING YOURSELF: INDEPENDENT
JUDGMENT_ADEQUATE_SAFELY_COMPLETE_DAILY_ACTIVITIES: YES
LACK_OF_TRANSPORTATION: NO
LACK_OF_TRANSPORTATION: NO
BATHING: INDEPENDENT
LACK_OF_TRANSPORTATION: NO
ADEQUATE_TO_COMPLETE_ADL: YES
LACK_OF_TRANSPORTATION: NO
WALKS IN HOME: INDEPENDENT
PATIENT'S MEMORY ADEQUATE TO SAFELY COMPLETE DAILY ACTIVITIES?: YES
TOILETING: INDEPENDENT

## 2024-11-30 ASSESSMENT — COGNITIVE AND FUNCTIONAL STATUS - GENERAL
MOBILITY SCORE: 24
DAILY ACTIVITIY SCORE: 24
PATIENT BASELINE BEDBOUND: NO

## 2024-11-30 ASSESSMENT — PAIN SCALES - GENERAL
PAINLEVEL_OUTOF10: 5 - MODERATE PAIN
PAINLEVEL_OUTOF10: 0 - NO PAIN

## 2024-11-30 ASSESSMENT — PAIN SCALES - WONG BAKER: WONGBAKER_NUMERICALRESPONSE: NO HURT

## 2024-11-30 ASSESSMENT — PATIENT HEALTH QUESTIONNAIRE - PHQ9
SUM OF ALL RESPONSES TO PHQ9 QUESTIONS 1 & 2: 0
1. LITTLE INTEREST OR PLEASURE IN DOING THINGS: NOT AT ALL
2. FEELING DOWN, DEPRESSED OR HOPELESS: NOT AT ALL

## 2024-11-30 NOTE — ED PROVIDER NOTES
History/Exam limitations: none.   Additional history was obtained from patient and past medical records.          HPI:    Rody Espinosa is a 53 y.o. female PMH diabetes, ESRD on HD presenting for evaluation of right second toe foul-smelling drainage and pain.  Patient states that she saw podiatry recently she was having some difficulty with her toenails.  She states that she had her toenails trimmed and they are concerned about the darkening of the skin of the toes.  They recommended getting vascular studies performed.  In the last few days she has noticed a foul smell and noted that there is discharge from the second toe.  No headache, vision changes, chest pain, abdominal pain or diarrhea, dysuria.  No missed dialysis sessions.         Physical Exam:  ED Triage Vitals [11/29/24 1808]   Temperature Heart Rate Respirations BP   37.4 °C (99.3 °F) (!) 109 18 113/73      Pulse Ox Temp Source Heart Rate Source Patient Position   95 % Temporal Monitor Sitting      BP Location FiO2 (%)     Left arm --        GEN:      Alert, NAD  Eyes:       PERRL, EOMI  HENT:      NC/AT, OP clear, airway patent, MM  CV:      Tachycardic rate, RR, no MRG, no LE pitting edema, 2+ pedal pulses  PULM:     CTAB, no w/r/r, easy WOB, symmetric chest rise  ABD:      Soft, NT, ND, no masses, BS +  :       No CVA TTP  NEURO:   A&Ox3, no focal deficits    MSK:      Palpable DP pulses b/l, R second toe with purulent discharge to the medial aspect, dark and necrotic appearing skin, warmth and erythema of the right foot   SKIN:       Warm and dry  PSYCH:    Appropriate mood and affect         MDM/ED Course:   Rody Espinosa is a 53 y.o. female PMH diabetes, ESRD on HD presenting for evaluation of right second toe foul-smelling drainage and pain.  Triage vitals markable for tachycardia, temp 99.3, blood pressure 113/73. Patient request blood pressure is being taken on her left lower extremity, states that she has baseline hypotensive and  when she sits up and improves, did improve to 110s over 60s when sitting upright in bed on initial assessment in ED room.  Exam as documented above.  Patient's workup was initiated in triage area and cultures ordered and broad-spectrum antibiotics with vancomycin and Zosyn were ordered.  Differential includes osteomyelitis, cellulitis, abscess, necrotizing soft tissue infection.  Patient does have a palpable DP pulse, less likely acute vascular occlusion.  No evidence of abscess on exam however there is foul-smelling discharge from toe wound.  No palpable crepitus or evidence of necrotizing soft tissue infection at this time.  IV placed and labs drawn.  CBC with no leukocytosis or left shift, hemoglobin reassuring.  ESR elevated at 124, CRP elevated 1.66.  Chemistry with elevated creatinine consistent with known CKD/ESRD history, potassium reassuring at 4.4.  Glucose low at 71, patient tolerating p.o. and glucose normal on reassessment.  Patient's lactate is reassuring at 1.5.  Reassuring mental status.  Patient was initially given p.o. Norco for pain control and still was reporting severe pain so low-dose 0.2 mg IV Dilaudid was given.  X-ray with soft tissue ulceration distal second toe with well-defined osteolysis.  Patient is well-appearing and hemodynamically stable in the ED and ultimately signed out to the admitting team for hospitalization for continued IV antibiotics, podiatry consultation, continued management.      ED Course as of 12/03/24 1135   Fri Nov 29, 2024   2331 While awaiting a bed patient began having some downtrending blood pressures.  When she wakes up and sits up her blood pressure does improve to 91/65.  Her heart rates in the 80s at 87 currently.  Her pain was not controlled and was still severe after p.o. Norco so very low-dose 0.2 Dilaudid has been given.  Also giving some additional IV fluids, patient appears relatively dry on exam, has a normal ejection fraction.  Her mental status is  reassuring and she feels well she states that there is frequently concerns about her blood pressure, when is taken in the clinic or at dialysis she states she sitting up with her legs below her. ICU paged to discuss ICU vs stepdown.  [JM]   9986 Patient supine with repeat blood pressure 95/77 potentially fluid responsive versus responding to midodrine.  Much more reassuring.  Patient currently sedated for stepdown.  Oncoming ED cholic to discussed with ICU attending. [JM]      ED Course User Index  [JM] Dean Mcneil MD         Diagnoses as of 12/03/24 1135   Osteomyelitis of right foot, unspecified type (Multi)   ESRD on hemodialysis (Multi)         Chronic medical conditions affecting care listed in MDM. I independently reviewed imaging studies and agreed with radiology reads. I reviewed recent and relevant outside records including PCP notes, Prior discharge summaries, and prior radiology reports.    Procedure  Procedures    Diagnosis:   1.  Right second toe osteomyelitis  2.  ESRD on HD    Dispo: Hospitalized in stable condition      Disclaimer: Portions of this note were dictated by speech recognition. An attempt at proof reading was made to minimize errors. Minor errors in transcription may be present.  Please call if questions.     Dean Mcneil MD  12/03/24 1130

## 2024-11-30 NOTE — CONSULTS
Inpatient consult to Nephrology  Consult performed by: Jay Loaiza DO  Consult ordered by: Ely Waddell PA-C      Reason For Consult  ESRD on hemodialysis    History Of Present Illness  Rody Espinosa is a 53 y.o. female with a past medical history of ESRD status post failed renal transplant in 2001 who dialyzes Monday Wednesday Friday at [facility] via right thigh AV fistula who is under the care of Dr. Vega.  Ms. Espinosa dialyzes on F180 x 4 hours, BFR//800 mL/minute, dry weight of 83.5 kg.  She has a history of hypertension, dyslipidemia, coronary artery disease, MI in 2006 status post PCI with RUTH x 2, diabetes, obstructive sleep apnea, GERD, history of parathyroidectomy, prior SVC syndrome, AV fistula stenosis, and history of meningitis who presents for evaluation of a soft tissue ulcer noted to be discolored and painful for the last several months.  She reports a few days history with foul-smelling drainage.  She was recently seen by podiatry and plan for vascular studies, not yet completed.  She presented to the ED for further evaluation.  She is afebrile without leukocytosis.  X-ray of the foot showed soft tissue ulceration of the distal second toe.  There is well-defined osteolysis.  She was started on antimicrobial coverage.  Infectious disease and podiatry evaluation requested.  Nephrology was consulted for renal care.     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)     Streptococcal meningitis       Past Surgical History:   Procedure Laterality Date    HYSTEROSCOPY  05/15/2013    Hysteroscopy  With Endometrial Ablation    OTHER SURGICAL HISTORY  05/15/2013    Renal Transplant    OTHER SURGICAL HISTORY  05/15/2013    Parathyroid Resection Sub-Total Parathyroidectomy    OTHER SURGICAL HISTORY  05/15/2013    Parathyroid Surgery    OTHER SURGICAL HISTORY  05/15/2013    Repair Of Brow Ptosis Right Upper Eyelid    OTHER SURGICAL HISTORY  05/15/2013    Cardiac Cath Lesion 1, 1st Adjunct Treat Device: Stent    OTHER SURGICAL HISTORY  05/15/2013    Biopsy Renal    OTHER SURGICAL HISTORY  10/25/2018    Hemodialysis Access Type Arteriovenous Graft Right Thigh    OTHER SURGICAL HISTORY  10/25/2018    Hemodialysis Access Type Arteriovenous Fistula Left Arm    OTHER SURGICAL HISTORY  10/22/2014    Percutaneous Transluminal Angioplasty (Non-Coronary)       Social History     Tobacco Use    Smoking status: Never    Smokeless tobacco: Never   Substance Use Topics    Alcohol use: Yes     Comment: occasional    Drug use: Never        Family History  No family history on file.     Allergies  Betadine [povidone-iodine], Iodinated contrast media, Adhesive tape-silicones, and Iodine    Review of Systems    GENERAL: No weight loss, malaise or fevers  HEENT: Negative for frequent or significant headaches, No changes in hearing or vision, no nose bleeds or other nasal problems  RESPIRATORY: Negative for cough, hemoptysis, wheezing or shortness of breath  CARDIOVASCULAR: Negative for chest pain, leg swelling or palpitations  GI: No nausea, vomiting; no diarrhea  MUSCULOSKELETAL: Negative for joint pain or swelling, back pain or muscle pain. + For toe discomfort.   SKIN: Negative for lesions, rash, and itching  PSYCH: Negative for sleep disturbance, mood disorder and recent psychosocial stressors  HEMATOLOGY/LYMPHOLOGY: Negative for prolonged bleeding, bruising easily or swollen nodes  ENDOCRINE: Negative for cold or heat intolerance, polyuria, polydipsia and goiter  NEURO: No history of headaches, syncope, paralysis, seizures or  "tremors    Physical Exam   GENERAL: Alert, and oriented x 3, no acute distress  SKIN: Skin color, texture, turgor normal.   HEAD: Atraumatic and normocephalic  EYES: PERRL, clear sclera  OROPHARYNX: Oropharynx normal.  NECK: No jugulovenous distention, No carotid bruits. Supple  Musculoskeletal: No synovitis.  LUNGS: Lungs clear to auscultation, Good diaphragmatic excursion.  CARDIAC: Normal S1 and S2; no rubs, murmurs, or gallops  ABDOMEN: Abdomen soft, non-tender, BS normal, No masses or organomegaly  EXTREMITIES: Rt thigh AV fistula within normal limits.  No leg edema. Chronic right arm edema. Right toe wound is covered.   NEURO: Cranial nerves II-XII intact.  Nonfocal.       I&O 24HR    Intake/Output Summary (Last 24 hours) at 11/30/2024 0918  Last data filed at 11/30/2024 0246  Gross per 24 hour   Intake 1650 ml   Output --   Net 1650 ml       Vitals 24HR  Heart Rate:  []   Temp:  [36.5 °C (97.7 °F)-37.4 °C (99.3 °F)]   Resp:  [16-18]   BP: ()/(50-77)   Height:  [170.2 cm (5' 7\")]   Weight:  [83.5 kg (184 lb 1.4 oz)]   SpO2:  [94 %-100 %]     Scheduled Medications  aspirin, 81 mg, oral, Daily  atorvastatin, 40 mg, oral, Nightly  docusate sodium, 100 mg, oral, BID  glipiZIDE, 10 mg, oral, BID  insulin lispro, 0-5 Units, subcutaneous, TID AC  metoprolol succinate XL, 25 mg, oral, Daily  midodrine, 5 mg, oral, q8h  pantoprazole, 40 mg, oral, BID AC   Or  pantoprazole, 40 mg, intravenous, BID AC  piperacillin-tazobactam, 2.25 g, intravenous, q8h  polyethylene glycol, 17 g, oral, Daily  pregabalin, 100 mg, oral, Nightly  sevelamer carbonate, 1,600 mg, oral, TID  timolol, 1 drop, ophthalmic (eye), Daily      Continuous medications       PRN medications: acetaminophen, dextrose, dextrose, glucagon, glucagon, HYDROmorphone, HYDROmorphone, melatonin, ondansetron ODT **OR** ondansetron, vancomycin     Relevant Results  Results from last 7 days   Lab Units 11/30/24  0611 11/29/24  1843   WBC AUTO x10*3/uL 9.7 " 10.3   HEMOGLOBIN g/dL 10.2* 11.7*   HEMATOCRIT % 28.6* 33.4*   PLATELETS AUTO x10*3/uL 157 191   NEUTROS PCT AUTO %  --  54.9   LYMPHS PCT AUTO %  --  16.7   MONOS PCT AUTO %  --  10.6   EOS PCT AUTO %  --  16.8      Results from last 7 days   Lab Units 11/30/24  0611 11/29/24  1843   SODIUM mmol/L 136 137   POTASSIUM mmol/L 4.7 4.4   CHLORIDE mmol/L 96* 93*   CO2 mmol/L 30 32   BUN mg/dL 27* 20   CREATININE mg/dL 9.77* 7.77*   CALCIUM mg/dL 9.3 9.1   PROTEIN TOTAL g/dL  --  9.2*   BILIRUBIN TOTAL mg/dL  --  0.6   ALK PHOS U/L  --  66   ALT U/L  --  8   AST U/L  --  7*   GLUCOSE mg/dL 62* 71*      XR foot right 3+ views   Final Result   Soft tissue ulceration distal second toe, with well-defined osteolysis   involving the mid portion of the adjacent distal phalanx   Signed by Flaco Tim MD            Assessment/Plan   Rody ANTHONY Espinosa is a 53 y.o. female with a past medical history of ESRD status post failed renal transplant in 2001 who dialyzes Monday Wednesday Friday at [facility] via right thigh AV fistula who is under the care of Dr. Vega.  Ms. Espinosa dialyzes on F180 x 4 hours, BFR//800 mL/minute, dry weight of 83.5 kg.  She has a history of hypertension, dyslipidemia, coronary artery disease, MI in 2006 status post PCI with RUTH x 2, diabetes, obstructive sleep apnea, GERD, history of parathyroidectomy, prior SVC syndrome, AV fistula stenosis, and history of meningitis who presents for evaluation of a soft tissue ulcer noted to be discolored and painful for the last several months.  She reports a few days history with foul-smelling drainage.  She was recently seen by podiatry and plan for vascular studies, not yet completed.  She presented to the ED for further evaluation.  She is afebrile without leukocytosis.  X-ray of the foot showed soft tissue ulceration of the distal second toe.  There is well-defined osteolysis.  She was started on antimicrobial coverage.  Infectious disease and  podiatry evaluation requested.  Nephrology was consulted for renal care. Ms Espinosa was dialyzed and her unit yesterday uneventfully.  Her electrolytes and volume status are acceptable.  Her hemoglobin is at target.  She is ordered a phosphorus binder.  I anticipate her next dialysis to be on Monday.  I discussed her care with ID, they recommend transfer to [facility].  Nephrology will follow while in house.      Principal Problem:    Osteomyelitis of right foot, unspecified type (Multi)  Active Problems:    Leg pain, anterior, right    Diabetic ulcer of toe of right foot associated with type 2 diabetes mellitus (Multi)    ESRD (end stage renal disease) on dialysis (Multi)    Controlled type 2 diabetes mellitus with diabetic polyneuropathy, without long-term current use of insulin      I spent 50 minutes in the professional and overall care of this patient.      Jay Loaiza, DO

## 2024-11-30 NOTE — PROGRESS NOTES
11/30/24 0752   Discharge Planning   Assistance Needed PCP: Perfecto Mcghee MD  Primary Contact:  Extended Emergency Contact Information  Primary Emergency Contact: Montez Rosario  Address: [id number] [place] [address]  Home Phone: [phone]  Mobile Phone: [phone]  Relation: Significant other  Secondary Emergency Contact: Tory Reeder  Mobile Phone: [phone]  Relation: Sister  Admission Status: Inpatient  Insurance Provider: [employer]     Rody Espinosa is a 53 y.o. female on day 1 of admission presenting with Osteomyelitis of right foot, unspecified type (Multi).    Jeanie Miranda RN

## 2024-11-30 NOTE — PROGRESS NOTES
Vancomycin Dosing by Pharmacy- INITIAL    Rody Espinosa is a 53 y.o. year old female who Pharmacy has been consulted for vancomycin dosing for osteomyelitis/septic arthritis. Based on the patient's indication and renal status this patient will be dosed based on a goal pre-HD level of 20-25.     Renal function is currently ESRD on HD MWF.    Visit Vitals  BP 90/63   Pulse 99   Temp 37.1 °C (98.8 °F)   Resp 18        Lab Results   Component Value Date    CREATININE 7.77 (H) 2024    CREATININE 10.10 (H) 2023    CREATININE 12.43 (H) 2023    CREATININE 11.29 (H) 2022        Patient weight is as follows:   Vitals:    24 1808   Weight: 83.5 kg (184 lb 1.4 oz)       Cultures:  No results found for the encounter in last 14 days.        No intake/output data recorded.  I/O during current shift:  I/O this shift:  In: 100 [IV Piggyback:100]  Out: -     Temp (24hrs), Av.3 °C (99.1 °F), Min:37.1 °C (98.8 °F), Max:37.4 °C (99.3 °F)         Assessment/Plan     Patient has already been given a loading dose of 2000 mg.  Will initiate vancomycin maintenance dose by level.    Follow-up level will be ordered on  at 0500 unless clinically indicated sooner based on HD schedule.  Will continue to monitor renal function daily while on vancomycin and order serum creatinine at least every 48 hours if not already ordered.  Follow for continued vancomycin needs, clinical response, and signs/symptoms of toxicity.       Aamir Burns, PharmD

## 2024-11-30 NOTE — PROGRESS NOTES
"Rody Espinosa is a 53 y.o. female on day 1 of admission presenting with Osteomyelitis of right foot, unspecified type (Multi).    Subjective   No acute events overnight. Pain is much better controlled today. Overall feeling alright.        Objective     VITALS  Blood pressure 87/56, pulse 78, temperature 37.2 °C (99 °F), temperature source Temporal, resp. rate 17, height 1.702 m (5' 7\"), weight 83.5 kg (184 lb 1.4 oz), SpO2 97%.  Physical Exam  Vitals and nursing note reviewed.   Constitutional:       General: She is not in acute distress.     Appearance: Normal appearance.   HENT:      Head: Normocephalic and atraumatic.   Cardiovascular:      Rate and Rhythm: Normal rate and regular rhythm.      Heart sounds: Normal heart sounds.   Pulmonary:      Effort: Pulmonary effort is normal. No respiratory distress.      Breath sounds: Normal breath sounds. No wheezing.   Abdominal:      General: Abdomen is flat. Bowel sounds are normal. There is no distension.      Palpations: Abdomen is soft.      Tenderness: There is no abdominal tenderness.   Musculoskeletal:         General: No swelling or tenderness. Normal range of motion.   Skin:     General: Skin is warm and dry.      Capillary Refill: Capillary refill takes less than 2 seconds.      Comments: R foot w/ necrotic ulceration to the second toe w/o purulent drainage. Second toe w/ slight soft tissue swelling   Neurological:      General: No focal deficit present.      Mental Status: She is alert and oriented to person, place, and time.   Psychiatric:         Mood and Affect: Mood normal.           Intake/Output last 3 Shifts:  I/O last 3 completed shifts:  In: 1650 (19.8 mL/kg) [IV Piggyback:1650]  Out: - (0 mL/kg)   Weight: 83.5 kg     Relevant Results  Results for orders placed or performed during the hospital encounter of 11/29/24 (from the past 24 hours)   CBC and Auto Differential   Result Value Ref Range    WBC 10.3 4.4 - 11.3 x10*3/uL    nRBC 0.0 0.0 - 0.0 " /100 WBCs    RBC 3.83 (L) 4.00 - 5.20 x10*6/uL    Hemoglobin 11.7 (L) 12.0 - 16.0 g/dL    Hematocrit 33.4 (L) 36.0 - 46.0 %    MCV 87 80 - 100 fL    MCH 30.5 26.0 - 34.0 pg    MCHC 35.0 32.0 - 36.0 g/dL    RDW 14.5 11.5 - 14.5 %    Platelets 191 150 - 450 x10*3/uL    Neutrophils % 54.9 40.0 - 80.0 %    Immature Granulocytes %, Automated 0.2 0.0 - 0.9 %    Lymphocytes % 16.7 13.0 - 44.0 %    Monocytes % 10.6 2.0 - 10.0 %    Eosinophils % 16.8 0.0 - 6.0 %    Basophils % 0.8 0.0 - 2.0 %    Neutrophils Absolute 5.66 1.20 - 7.70 x10*3/uL    Immature Granulocytes Absolute, Automated 0.02 0.00 - 0.70 x10*3/uL    Lymphocytes Absolute 1.72 1.20 - 4.80 x10*3/uL    Monocytes Absolute 1.09 (H) 0.10 - 1.00 x10*3/uL    Eosinophils Absolute 1.73 (H) 0.00 - 0.70 x10*3/uL    Basophils Absolute 0.08 0.00 - 0.10 x10*3/uL   Comprehensive Metabolic Panel   Result Value Ref Range    Glucose 71 (L) 74 - 99 mg/dL    Sodium 137 136 - 145 mmol/L    Potassium 4.4 3.5 - 5.3 mmol/L    Chloride 93 (L) 98 - 107 mmol/L    Bicarbonate 32 21 - 32 mmol/L    Anion Gap 16 10 - 20 mmol/L    Urea Nitrogen 20 6 - 23 mg/dL    Creatinine 7.77 (H) 0.50 - 1.05 mg/dL    eGFR 6 (L) >60 mL/min/1.73m*2    Calcium 9.1 8.6 - 10.3 mg/dL    Albumin 4.0 3.4 - 5.0 g/dL    Alkaline Phosphatase 66 33 - 110 U/L    Total Protein 9.2 (H) 6.4 - 8.2 g/dL    AST 7 (L) 9 - 39 U/L    Bilirubin, Total 0.6 0.0 - 1.2 mg/dL    ALT 8 7 - 45 U/L   Lactate   Result Value Ref Range    Lactate 1.5 0.4 - 2.0 mmol/L   Blood Culture    Specimen: Peripheral Venipuncture; Blood culture   Result Value Ref Range    Blood Culture Loaded on Instrument - Culture in progress    Blood Culture    Specimen: Peripheral Venipuncture; Blood culture   Result Value Ref Range    Blood Culture Loaded on Instrument - Culture in progress    Sedimentation rate, automated   Result Value Ref Range    Sedimentation Rate 124 (H) 0 - 30 mm/h   C-reactive protein   Result Value Ref Range    C-Reactive Protein 1.66  (H) <1.00 mg/dL   Hemoglobin A1C   Result Value Ref Range    Hemoglobin A1C 5.7 (H) See comment %    Estimated Average Glucose 117 Not Established mg/dL   POCT GLUCOSE   Result Value Ref Range    POCT Glucose 128 (H) 74 - 99 mg/dL   CBC   Result Value Ref Range    WBC 9.7 4.4 - 11.3 x10*3/uL    nRBC 0.0 0.0 - 0.0 /100 WBCs    RBC 3.25 (L) 4.00 - 5.20 x10*6/uL    Hemoglobin 10.2 (L) 12.0 - 16.0 g/dL    Hematocrit 28.6 (L) 36.0 - 46.0 %    MCV 88 80 - 100 fL    MCH 31.4 26.0 - 34.0 pg    MCHC 35.7 32.0 - 36.0 g/dL    RDW 14.2 11.5 - 14.5 %    Platelets 157 150 - 450 x10*3/uL   Basic metabolic panel   Result Value Ref Range    Glucose 62 (L) 74 - 99 mg/dL    Sodium 136 136 - 145 mmol/L    Potassium 4.7 3.5 - 5.3 mmol/L    Chloride 96 (L) 98 - 107 mmol/L    Bicarbonate 30 21 - 32 mmol/L    Anion Gap 15 10 - 20 mmol/L    Urea Nitrogen 27 (H) 6 - 23 mg/dL    Creatinine 9.77 (H) 0.50 - 1.05 mg/dL    eGFR 4 (L) >60 mL/min/1.73m*2    Calcium 9.3 8.6 - 10.3 mg/dL   POCT GLUCOSE   Result Value Ref Range    POCT Glucose 55 (L) 74 - 99 mg/dL       Imaging Results  XR foot right 3+ views   Final Result   Soft tissue ulceration distal second toe, with well-defined osteolysis   involving the mid portion of the adjacent distal phalanx   Signed by Flaco Tim MD          Medications:  aspirin, 81 mg, oral, Daily  atorvastatin, 40 mg, oral, Nightly  docusate sodium, 100 mg, oral, BID  glipiZIDE, 10 mg, oral, BID  insulin lispro, 0-5 Units, subcutaneous, TID AC  metoprolol succinate XL, 25 mg, oral, Daily  midodrine, 5 mg, oral, q8h  pantoprazole, 40 mg, oral, BID AC   Or  pantoprazole, 40 mg, intravenous, BID AC  piperacillin-tazobactam, 2.25 g, intravenous, q8h  polyethylene glycol, 17 g, oral, Daily  pregabalin, 100 mg, oral, Nightly  sevelamer carbonate, 1,600 mg, oral, TID  timolol, 1 drop, ophthalmic (eye), Daily       PRN medications: acetaminophen, dextrose, dextrose, glucagon, glucagon, HYDROmorphone, HYDROmorphone,  melatonin, ondansetron ODT **OR** ondansetron, vancomycin        Assessment/Plan          Principal Problem:    Osteomyelitis of right foot, unspecified type (Multi)  Active Problems:    Leg pain, anterior, right    Diabetic ulcer of toe of right foot associated with type 2 diabetes mellitus (Multi)    ESRD (end stage renal disease) on dialysis (Multi)    Controlled type 2 diabetes mellitus with diabetic polyneuropathy, without long-term current use of insulin    Osteomyelitis of R foot  -Continue with vanc and Zosyn  -Consult Pods   -Consult ID  -IV pain control     DM type II  -Corrective insulin   -Glipizide   -Hypoglycemic protocol     ESRD  -Nephro following for dialysis   -Sevelamer       DVT Prophylaxis:  Heparin subq    Disposition:  Awaiting Pods recs     Solomon Deleon, [facility]

## 2024-11-30 NOTE — H&P
History Of Present Illness  Rody Espinosa is a 53 y.o. female presenting with osteomyelitis of the right second toe, diabetic soft tissue ulcer of the right second toe, end-stage renal disease on hemodialysis, type 2 diabetes with polyneuropathy, right anterior lower leg pain radiating from the foot,.  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  Patient presented to the emergency department for foul-smelling drainage from the right second toe for the last few days.  Patient has had soft tissue ulcer and discoloration of the toes with severe pain for the last several months.  She rates her pain a 20 out of 10.  She saw podiatry recently to have her toenails trimmed and he had concern for the increased pigmentation and ordered vascular studies which have not been done yet.  She does see a vascular surgeon at the [facility].    Past medical history: Type 2 diabetes with polyneuropathy, end-stage renal disease on hemodialysis Mondays Wednesdays and Fridays, AV fistula with AV fistula stenosis, hypotension, proptosis, blind in the left eye from bleeding in the eye secondary to anticoagulants, peripheral artery disease, hypertension, GERD, hyperlipidemia, onychomycosis, venous thrombosis, chronic lower back pain, sciatica, previous MI, CAD, osteoporosis, hyperparathyroidism, depression, endometriosis, lymphedema, obstructive sleep apnea not using CPAP, subdural hematoma, superior vena cava syndrome    Medications: Renvela, glipizide, Lyrica, 81 mg aspirin, atorvastatin, nephron FA, metoprolol  succinate, omeprazole 80 mg nightly, timolol eyedrops as needed pain.    Allergies: Betadine, iodine contrast media, tape    Social history: Denies tobacco or alcohol use    ED course:.  CMP  showed a blood sugar of 71, chloride 93, creatinine 7.77, GFR 6, (patient did complete dialysis today) her AST is 7, total protein is 9.2, the rest of the CMP is within normal limits.  Lactate is normal at 1.5  CRP is 1.66 and her sed rate is elevated at 124  CBC has a normal white count and no left shift, mild anemia with a hemoglobin 11.7 and hematocrit of 33.4, normal platelets.  Blood cultures are pending  X-ray of the right foot showed soft tissue ulceration to the distal second toe with well-defined osteolysis involving the midportion of the adjacent distal phalanx.  In the ED the patient was treated with vancomycin and Zosyn, she received Norco for pain and then Dilaudid, saline 500 mL, blood cultures were sent, podiatry consult was ordered,.       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)     Streptococcal meningitis     Past Surgical History:   Procedure Laterality Date    HYSTEROSCOPY  05/15/2013    Hysteroscopy With Endometrial Ablation    OTHER SURGICAL HISTORY  05/15/2013    Renal Transplant    OTHER SURGICAL HISTORY  05/15/2013    Parathyroid Resection Sub-Total Parathyroidectomy    OTHER SURGICAL HISTORY  05/15/2013    Parathyroid Surgery    OTHER SURGICAL HISTORY  05/15/2013    Repair Of Brow Ptosis Right Upper Eyelid    OTHER SURGICAL HISTORY  05/15/2013    Cardiac Cath Lesion 1, 1st Adjunct Treat Device: Stent    OTHER SURGICAL HISTORY  05/15/2013    Biopsy Renal    OTHER SURGICAL HISTORY  10/25/2018    Hemodialysis Access Type Arteriovenous Graft Right Thigh    OTHER SURGICAL HISTORY  10/25/2018    Hemodialysis  Access Type Arteriovenous Fistula Left Arm    OTHER SURGICAL HISTORY  10/22/2014    Percutaneous Transluminal Angioplasty (Non-Coronary)     Social History     Tobacco Use    Smoking status: Never    Smokeless tobacco: Never   Substance Use Topics    Alcohol use: Yes     Comment: occasional    Drug use: Never        Family History  No family history on file.     Allergies  Betadine [povidone-iodine], Iodinated contrast media, Adhesive tape-silicones, and Iodine    Review of Systems  Patient denies chest pain, shortness of breath, nausea, vomiting, fever, chills, diarrhea, constipation,  vision changes, rashes, dysuria, paresthesias, vertigo, headache, cough or cold symptoms, or any other complaints at this time. A complete review of systems was done, and is as stated in the history of present illness, is otherwise negative or not pertinent to the complaint.    Physical Exam  Physical exam: Vital signs and nurses notes were reviewed.    General:  no acute distress. Alert and oriented  x 3.     Head: atraumatic and normocephalic    Eyes: Patient is blind in the left eye and has proptosis on the right eyelid.  EOMs are intact, conjunctivae is not injected.    Oropharynx:  no trismus or drooling, buccal mucosa is moist.    Ears:  normal external exam, no swelling or erythema,     Nasal: normal external exam,     Neck: Supple, full range of motion,     Cardiac: Regular rate and rhythm. No murmurs noted.     Pulmonary: Lungs clear bilaterally with good aeration. No adventitious breath sounds. No wheezes rales or rhonchi. No accessory muscle use no retraction noted.    Abdomen: Soft,  Nontender. No guarding, rigidity, or distention. Normoactive bowel sounds. No pulsatile masses, no bruits.     Extremities:  Full range of motion.  No pitting edema.  No pain with palpation to the lower legs anterior or posterior.  Examination of the right foot reveals necrotic looking ulceration to the second toe but no purulent drainage was  seen.  Second toe has some slight soft tissue swelling.  There is increased pigmentation or darkening of the  toes on the right foot particularly 2 and 5.  She has an intact dorsalis pedal pulse.    Skin: No rash seen. Skin is warm and dry     Neuro: Patient is alert and oriented x3. Speech is clear. There is no asymmetry with facial grimaces, and no tongue deviation. Patient moves all extremities independently. Sensation is intact. No obvious neuro deficits are noted.     Last Recorded Vitals  BP 90/63   Pulse 99   Temp 37.1 °C (98.8 °F)   Resp 18   Wt 83.5 kg (184 lb 1.4 oz)   SpO2 95%     Relevant Results  Scheduled medications  atorvastatin, 40 mg, oral, Nightly  docusate sodium, 100 mg, oral, BID  [START ON 11/30/2024] glipiZIDE, 10 mg, oral, BID  [START ON 11/30/2024] insulin lispro, 0-5 Units, subcutaneous, TID AC  [START ON 11/30/2024] metoprolol succinate XL, 25 mg, oral, Daily  midodrine, 5 mg, oral, q8h  [START ON 11/30/2024] pantoprazole, 40 mg, oral, BID AC   Or  [START ON 11/30/2024] pantoprazole, 40 mg, intravenous, BID AC  [START ON 11/30/2024] piperacillin-tazobactam, 2.25 g, intravenous, q8h  [START ON 11/30/2024] polyethylene glycol, 17 g, oral, Daily  [START ON 11/30/2024] sevelamer carbonate, 1,600 mg, oral, TID      Continuous medications     PRN medications  PRN medications: acetaminophen, dextrose, dextrose, glucagon, glucagon, HYDROmorphone, HYDROmorphone, melatonin, ondansetron ODT **OR** ondansetron, vancomycin    Results for orders placed or performed during the hospital encounter of 11/29/24 (from the past 24 hours)   CBC and Auto Differential   Result Value Ref Range    WBC 10.3 4.4 - 11.3 x10*3/uL    nRBC 0.0 0.0 - 0.0 /100 WBCs    RBC 3.83 (L) 4.00 - 5.20 x10*6/uL    Hemoglobin 11.7 (L) 12.0 - 16.0 g/dL    Hematocrit 33.4 (L) 36.0 - 46.0 %    MCV 87 80 - 100 fL    MCH 30.5 26.0 - 34.0 pg    MCHC 35.0 32.0 - 36.0 g/dL    RDW 14.5 11.5 - 14.5 %    Platelets 191 150 - 450 x10*3/uL     Neutrophils % 54.9 40.0 - 80.0 %    Immature Granulocytes %, Automated 0.2 0.0 - 0.9 %    Lymphocytes % 16.7 13.0 - 44.0 %    Monocytes % 10.6 2.0 - 10.0 %    Eosinophils % 16.8 0.0 - 6.0 %    Basophils % 0.8 0.0 - 2.0 %    Neutrophils Absolute 5.66 1.20 - 7.70 x10*3/uL    Immature Granulocytes Absolute, Automated 0.02 0.00 - 0.70 x10*3/uL    Lymphocytes Absolute 1.72 1.20 - 4.80 x10*3/uL    Monocytes Absolute 1.09 (H) 0.10 - 1.00 x10*3/uL    Eosinophils Absolute 1.73 (H) 0.00 - 0.70 x10*3/uL    Basophils Absolute 0.08 0.00 - 0.10 x10*3/uL   Comprehensive Metabolic Panel   Result Value Ref Range    Glucose 71 (L) 74 - 99 mg/dL    Sodium 137 136 - 145 mmol/L    Potassium 4.4 3.5 - 5.3 mmol/L    Chloride 93 (L) 98 - 107 mmol/L    Bicarbonate 32 21 - 32 mmol/L    Anion Gap 16 10 - 20 mmol/L    Urea Nitrogen 20 6 - 23 mg/dL    Creatinine 7.77 (H) 0.50 - 1.05 mg/dL    eGFR 6 (L) >60 mL/min/1.73m*2    Calcium 9.1 8.6 - 10.3 mg/dL    Albumin 4.0 3.4 - 5.0 g/dL    Alkaline Phosphatase 66 33 - 110 U/L    Total Protein 9.2 (H) 6.4 - 8.2 g/dL    AST 7 (L) 9 - 39 U/L    Bilirubin, Total 0.6 0.0 - 1.2 mg/dL    ALT 8 7 - 45 U/L   Lactate   Result Value Ref Range    Lactate 1.5 0.4 - 2.0 mmol/L   Sedimentation rate, automated   Result Value Ref Range    Sedimentation Rate 124 (H) 0 - 30 mm/h   C-reactive protein   Result Value Ref Range    C-Reactive Protein 1.66 (H) <1.00 mg/dL     XR foot right 3+ views   Final Result   Soft tissue ulceration distal second toe, with well-defined osteolysis   involving the mid portion of the adjacent distal phalanx   Signed by Flaco Urban, MD             Assessment/Plan   Assessment & Plan  Osteomyelitis of right foot, unspecified type (Multi)    Leg pain, anterior, right    Diabetic ulcer of toe of right foot associated with type 2 diabetes mellitus (Multi)    ESRD (end stage renal disease) on dialysis (Multi)    Controlled type 2 diabetes mellitus with diabetic polyneuropathy, without  long-term current use of insulin      Plan: Podiatry consult pending  Nephrology consult pending as the patient will need dialysis on Monday if still here  ID consult pending for osteomyelitis  Social work consult pending  Continue vancomycin and Zosyn  Sliding-scale low-dose lispro as needed  Dilaudid as needed  Tylenol, Zofran, melatonin all ordered as needed  Continuous pulse ox  Daily labs  SCDs  No Lovenox or heparin secondary to history of bleeding in the left eye while on anticoagulants that caused blindness  Pantoprazole was ordered twice daily to replace her omeprazole  Patient's home medications including atorvastatin, lyrica, ASA, timolol prn, Colace, glipizide, Toprol-XL and Renvela all are ordered  Findings, orders, plan all discussed with Dr. Kaitlynn Waddell PA-C

## 2024-11-30 NOTE — CARE PLAN
Problem: Nutrition  Goal: Less than 5 days NPO/clear liquids  Outcome: Progressing  Goal: Oral intake greater than 50%  Outcome: Progressing  Goal: Oral intake greater 75%  Outcome: Progressing  Goal: Consume prescribed supplement  Outcome: Progressing  Goal: Adequate PO fluid intake  Outcome: Progressing  Goal: Nutrition support goals are met within 48 hrs  Outcome: Progressing  Goal: Nutrition support is meeting 75% of nutrient needs  Outcome: Progressing  Goal: Tube feed tolerance  Outcome: Progressing  Goal: BG  mg/dL  Outcome: Progressing  Goal: Lab values WNL  Outcome: Progressing  Goal: Electrolytes WNL  Outcome: Progressing  Goal: Promote healing  Outcome: Progressing  Goal: Maintain stable weight  Outcome: Progressing  Goal: Reduce weight from edema/fluid  Outcome: Progressing  Goal: Gradual weight gain  Outcome: Progressing  Goal: Improve ostomy output  Outcome: Progressing     Problem: Diabetes  Goal: Achieve decreasing blood glucose levels by end of shift  Outcome: Progressing  Goal: Increase stability of blood glucose readings by end of shift  Outcome: Progressing  Goal: Decrease in ketones present in urine by end of shift  Outcome: Progressing  Goal: Maintain electrolyte levels within acceptable range throughout shift  Outcome: Progressing  Goal: Maintain glucose levels >70mg/dl to <250mg/dl throughout shift  Outcome: Progressing  Goal: No changes in neurological exam by end of shift  Outcome: Progressing  Goal: Learn about and adhere to nutrition recommendations by end of shift  Outcome: Progressing  Goal: Vital signs within normal range for age by end of shift  Outcome: Progressing  Goal: Increase self care and/or family involovement by end of shift  Outcome: Progressing  Goal: Receive DSME education by end of shift  Outcome: Progressing   The patient's goals for the shift include no amputation    The clinical goals for the shift include keep pt. HDS    Over the shift, the patient did not  make progress toward the following goals. Barriers to progression include . Recommendations to address these barriers include .

## 2024-11-30 NOTE — CONSULTS
"PODIATRIC MEDICINE & SURGERY - INITIAL CONSULT NOTE    Subjective   Rody Espinosa is a 53 y.o. female who is on day 1 of admission for Osteomyelitis of right foot, unspecified type (Multi).     HPI:  Rody Espinosa is a 53 y.o. female presenting with osteomyelitis of the right second toe, diabetic soft tissue ulcer of the right second toe . Patient has a PMH of : Type 2 diabetes with polyneuropathy, end-stage renal disease on hemodialysis Mondays Wednesdays and Fridays, AV fistula with AV fistula stenosis, hypotension, proptosis, blind in the left eye from bleeding in the eye secondary to anticoagulants, peripheral artery disease, hypertension, GERD, hyperlipidemia, onychomycosis, venous thrombosis, chronic lower back pain, sciatica, previous MI, CAD, osteoporosis, hyperparathyroidism, depression, endometriosis, lymphedema, obstructive sleep apnea not using CPAP, subdural hematoma, superior vena cava syndrome.     Patient states that last week she went to her podiatrist Dr. Isai Contreras to have her nails debrided. Patient expresses that for months prior to her visit, her digits have been discolored and appeared darker. Patient also expresses that she has had intense pains in her toes. Patient states that after her visit is when she started to notice her right 2nd digit become more discolored and darkened. Patient states that it began to \" smell bad\" which prompted her to go to the ED.  Patient states that she was scheduled to get vascular studies done.  Patient denies all constitutional symptoms at this time.         Review of Systems  Constitutional: Negative.    HENT: Negative.     Eyes: Negative.    Respiratory: Negative.     Cardiovascular: Negative.    Gastrointestinal: Negative.    Endocrine: Negative.    Genitourinary: Negative.    Musculoskeletal: As noted in HPI  Skin: As noted in HPI    Past Medical History:   Diagnosis Date    Allergic purpura (CMS-[facility])     HSP (Henoch-Schonlein purpura) nephritis " "   Benign neoplasm of connective and other soft tissue, unspecified     Fibroid tumor    Compression of vein 11/16/2022    SVC syndrome    End stage renal disease (Multi)     ESRD (end stage renal disease)    Other conditions influencing health status     Acute Myocardial Infarction    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[facility])     Streptococcal meningitis       Social History     Tobacco Use    Smoking status: Never    Smokeless tobacco: Never   Substance Use Topics    Alcohol use: Yes     Comment: occasional    Drug use: Never       Recent Surgeries in Podiatry            No cases to display            Allergies   Allergen Reactions    Betadine [Povidone-Iodine] Unknown    Iodinated Contrast Media Itching    Adhesive Tape-Silicones Rash    Iodine Rash     Mild rash       Medications  Scheduled medications  aspirin, 81 mg, oral, Daily  atorvastatin, 40 mg, oral, Nightly  docusate sodium, 100 mg, oral, BID  glipiZIDE, 10 mg, oral, BID  insulin lispro, 0-5 Units, subcutaneous, TID AC  metoprolol succinate XL, 25 mg, oral, Daily  midodrine, 5 mg, oral, q8h  pantoprazole, 40 mg, oral, BID AC   Or  pantoprazole, 40 mg, intravenous, BID AC  piperacillin-tazobactam, 2.25 g, intravenous, q8h  polyethylene glycol, 17 g, oral, Daily  pregabalin, 100 mg, oral, Nightly  sevelamer carbonate, 1,600 mg, oral, TID  timolol, 1 drop, ophthalmic (eye), Daily      Continuous medications       PRN medications: acetaminophen, dextrose, dextrose, glucagon, glucagon, HYDROmorphone, HYDROmorphone, melatonin, ondansetron ODT **OR** ondansetron, vancomycin    Objective   Visit Vitals  BP 87/56 (BP Location: Left leg, Patient Position: Lying)   Pulse 78   Temp 37.2 °C (99 °F) (Temporal)   Resp 17   Ht 1.702 m (5' 7\")   Wt 83.5 kg (184 lb 1.4 oz)   SpO2 97%   BMI 28.83 kg/m²   Smoking Status Never   BSA 1.99 m²       Physical Exam  Constitutional: NAD and AAOx3  HEENT: Head is normocephalic and atraumatic. External ear is normal B/L. Conjunctivae " normal B/L. Nose is normal. Oropharynx is clear. Mouth is moist.  Cardiovascular: Normal rate.  Pulmonary: No increased work of breathing.  Psychological: Appropriate mood and behavior.     Vascular: DP and PT pulses are non - palpable bilateral.  delayed bilateral.  Skin temperature is warm to warm proximal to distal bilateral.      Neurologic:  Light touch is intact to the foot bilateral. Protective sensation is diminished to the foot B/L     Musculoskeletal: Muscle strength is appropriate to age and activity level.     Dermatologic: Nails 1-5 bilateral are intact.  Webspaces are clean, dry and intact bilateral.  There are no hyperkeratoses, ulcerations, verruca or other lesions noted.  Digits 2, 3 and 5 are darkened. Wound as noted below    WOUND #1: Right 2nd digit   Base: Dry gangrene noted to the digit down to the level of the IPJ.  No tunneling or probe to bone.  Rim: The skin edges are non-macerated. Skin edges irregular but intact. No undermining.  Drainage: No drainage with malodor.  Periwound: Mild periwound erythema and edema.  No ascending cellulitis or lymphangitis. No palpable fluctuance.   Pain: yes     Lab Results   Component Value Date    WBC 9.7 11/30/2024    HGB 10.2 (L) 11/30/2024    HCT 28.6 (L) 11/30/2024     11/30/2024    ALT 8 11/29/2024    AST 7 (L) 11/29/2024     11/30/2024    K 4.7 11/30/2024    CL 96 (L) 11/30/2024    CREATININE 9.77 (H) 11/30/2024    BUN 27 (H) 11/30/2024    CO2 30 11/30/2024    INR 1.0 04/04/2023    HGBA1C 5.7 (H) 11/29/2024     par  Lab Results   Component Value Date    SEDRATE 124 (H) 11/29/2024     Lab Results   Component Value Date    CRP 1.66 (H) 11/29/2024      Cultures  No results found for the last 90 days.      IMAGING  XR foot right 3+ views    Result Date: 11/29/2024  STUDY: Foot Radiographs; 11/29/2024, 6:34 PM INDICATION: 2nd toe has a ulcer. COMPARISON: None Available. ACCESSION NUMBER(S): [id number] ORDERING CLINICIAN: MEHNAZ FRANCISCO  TECHNIQUE:  three view(s) of the right foot. FINDINGS:  There is soft tissue ulceration at the distal second toe, with minimal soft tissue irregularity. There is well-defined osteolysis involving the mid portion of the adjacent distal phalanx. Elsewhere, there is minimal generalized swelling in the foot, with minimal vascular calcifications. There is minimal hallux valgus.    Soft tissue ulceration distal second toe, with well-defined osteolysis involving the mid portion of the adjacent distal phalanx Signed by Flaco Tim MD      Assessment/Plan   Rody Espinosa is a 53 y.o. female who is on day 1 of admission for Osteomyelitis of right foot, unspecified type (Multi). Podiatry consulted for OM of right 2nd digit.    #DM type 2 with neuropathy   #Dry gangrene of right 2nd digit   #Osteomyelitis, right toe   #PVD  #Pain in toes, Right     Reviewed labs, imaging and notes. Afebrile and non-tachycardic.  - WBC 9.7, , CRP 1.66.   - Blood culture is normal   - XR right foot reviewed.   - Ordered R foot MRI.    Plan/Recommendations:  - Dressing changed with betadine and sterile gauze 4x4.  - Recommend daily dressing as above.   -  The patient is on dialysis and has had a fem graft or fistula on her right side. Patient has been told both in the past and is unsure of which. The overall concern is getting vascular studies done that will potentially be detrimental. After discussion with Dr. Casanova, we believe that the best option is to transfer to [facility] [place] and see [facility] surgery.   - Order for right foot MRI placed.   - Continue IV abx per primary team/ID.   - No plan for podiatric surgical intervention at this time  - Thank you for the consult. Podiatry team will continue to follow.     Patient examined and evaluated with attending physician, Dr. Igor Lambert.     Bereket Cox , PGY-1  Podiatric Medicine & Surgery

## 2024-11-30 NOTE — PROGRESS NOTES
11/30/24 1205   Discharge Planning   Living Arrangements Family members   Support Systems Family members   Assistance Needed met with patient. she lives wt multiple family members. spouse had cva, is at [facility]. she states she is ambulatory unassisted;  goes to [facility], drives self, but is blind on left eye. was doing HD at home, but spouse is not there anymore, so cannot. was not going to anyone for wound care, as this wound n foot just appeared. watch for id recs   Type of Residence Private residence   Home or Post Acute Services In home services   Type of Home Care Services Home nursing visits   Expected Discharge Disposition Home H   Does the patient need discharge transport arranged? No   Financial Resource Strain   How hard is it for you to pay for the very basics like food, housing, medical care, and heating? Not very   Housing Stability   In the past 12 months, how many times have you moved where you were living? 0   At any time in the past 12 months, were you homeless or living in a shelter (including now)? N   Transportation Needs   In the past 12 months, has lack of transportation kept you from medical appointments or from getting medications? no   In the past 12 months, has lack of transportation kept you from meetings, work, or from getting things needed for daily living? No     Rody CRUZ Jesús is a 53 y.o. female on day 1 of admission presenting with Osteomyelitis of right foot, unspecified type (Multi).    Jeanie Miranda RN

## 2024-11-30 NOTE — CONSULTS
"INFECTIOUS DISEASE INPATIENT INITIAL CONSULTATION    Referred By: Ely Waddell    Reason For Consult:  OM of right second toe with soft tissue ulcer in a type II DM and ESRD on dialysis    HPI:  This is a 53 y.o. female with PMH of ESRD on HD who presented with right 2nd toe infection.    Had toe nail clipped last week. Noticed drainage/opening in the middle/medial right 2nd toe. Painful as well. No fever/chills.    Allergies:  Betadine [povidone-iodine], Iodinated contrast media, Adhesive tape-silicones, and Iodine     Vitals (Last 24 Hours):  Heart Rate:  []   Temp:  [36.5 °C (97.7 °F)-37.4 °C (99.3 °F)]   Resp:  [16-18]   BP: ()/(50-77)   Height:  [170.2 cm (5' 7\")]   Weight:  [83.5 kg (184 lb 1.4 oz)]   SpO2:  [94 %-100 %]      PHYSICAL EXAM:  Gen - NAD  Heart - RRR  Lungs - clear bilaterally, no wh  Abd - soft, no ttp, BS present  Right Foot - 2nd toe with fractured like appearance and some necrosis, some signs of starting necrosis on 3rd toe and 5th toe as well. No signs of infection ascending into the foot of lower leg.  Right Groin - femoral AVF present  Skin - no rash    MEDS:    Current Facility-Administered Medications:     acetaminophen (Tylenol) tablet 650 mg, 650 mg, oral, q4h PRN, Ely Waddell PA-C    aspirin EC tablet 81 mg, 81 mg, oral, Daily, Ely Waddell PA-C, 81 mg at 11/30/24 0848    atorvastatin (Lipitor) tablet 40 mg, 40 mg, oral, Nightly, Ely Waddell PA-C, 40 mg at 11/29/24 2223    dextrose 50 % injection 12.5 g, 12.5 g, intravenous, q15 min PRN, Ely Waddell PA-C    dextrose 50 % injection 25 g, 25 g, intravenous, q15 min PRN, Ely Waddell PA-C    docusate sodium (Colace) capsule 100 mg, 100 mg, oral, BID, Ely Waddell PA-C, 100 mg at 11/30/24 0848    glipiZIDE (Glucotrol) tablet 10 mg, 10 mg, oral, BID, Ely Waddell PA-C, 10 mg at 11/30/24 0848    glucagon (Glucagen) injection 1 mg, 1 mg, intramuscular, q15 min PRN, Ely Waddell PA-C    " glucagon (Glucagen) injection 1 mg, 1 mg, intramuscular, q15 min PRN, Ely Waddell PA-C    heparin (porcine) injection 5,000 Units, 5,000 Units, subcutaneous, q8h, Solomon Deleon DO    HYDROmorphone (Dilaudid) injection 0.5 mg, 0.5 mg, intravenous, q4h PRN, Ely Waddell PA-C    HYDROmorphone PF (Dilaudid) injection 0.2 mg, 0.2 mg, intravenous, q4h PRN, Ely Waddell PA-C, 0.2 mg at 11/30/24 0559    insulin lispro injection 0-5 Units, 0-5 Units, subcutaneous, TID AC, Ely Waddell PA-C    melatonin tablet 3 mg, 3 mg, oral, Nightly PRN, Ely Waddell PA-C    metoprolol succinate XL (Toprol-XL) 24 hr tablet 25 mg, 25 mg, oral, Daily, Ely Waddell PA-C    midodrine (Proamatine) tablet 5 mg, 5 mg, oral, q8h, Abdullahi Calderón DO, 5 mg at 11/30/24 1209    ondansetron ODT (Zofran-ODT) disintegrating tablet 4 mg, 4 mg, oral, q8h PRN **OR** ondansetron (Zofran) injection 4 mg, 4 mg, intravenous, q8h PRN, Ely Waddell PA-C    pantoprazole (ProtoNix) EC tablet 40 mg, 40 mg, oral, BID AC **OR** pantoprazole (ProtoNix) injection 40 mg, 40 mg, intravenous, BID AC, Ely Waddell PA-C    piperacillin-tazobactam (Zosyn) 2.25 g in dextrose (iso) IV 50 mL, 2.25 g, intravenous, q8h, Ely Waddell PA-C, Stopped at 11/30/24 1247    polyethylene glycol (Glycolax, Miralax) packet 17 g, 17 g, oral, Daily, Ely Waddell PA-C, 17 g at 11/30/24 0848    pregabalin (Lyrica) capsule 100 mg, 100 mg, oral, Nightly, Ely Waddell PA-C, 100 mg at 11/29/24 2249    sevelamer carbonate (Renvela) tablet 1,600 mg, 1,600 mg, oral, TID, Ely Waddell PA-C, 1,600 mg at 11/30/24 1209    timolol (Timoptic) 0.5 % ophthalmic solution 1 drop, 1 drop, ophthalmic (eye), Daily, Ely Waddell PA-C, 1 drop at 11/30/24 0848    vancomycin (Vancocin) pharmacy to dose - pharmacy monitoring, , miscellaneous, Daily PRN, Ely Waddell PA-C     LABS:  Lab Results   Component Value Date    WBC 9.7 11/30/2024    HGB 10.2 (L)  11/30/2024    HCT 28.6 (L) 11/30/2024    MCV 88 11/30/2024     11/30/2024      Results from last 72 hours   Lab Units 11/30/24  0611   SODIUM mmol/L 136   POTASSIUM mmol/L 4.7   CHLORIDE mmol/L 96*   CO2 mmol/L 30   BUN mg/dL 27*   CREATININE mg/dL 9.77*   GLUCOSE mg/dL 62*   CALCIUM mg/dL 9.3   ANION GAP mmol/L 15   EGFR mL/min/1.73m*2 4*     Results from last 72 hours   Lab Units 11/29/24  1843   ALK PHOS U/L 66   BILIRUBIN TOTAL mg/dL 0.6   PROTEIN TOTAL g/dL 9.2*   ALT U/L 8   AST U/L 7*   ALBUMIN g/dL 4.0     Estimated Creatinine Clearance: 7.4 mL/min (A) (by C-G formula based on SCr of 9.77 mg/dL (H)).  C-Reactive Protein   Date/Time Value Ref Range Status   11/29/2024 06:43 PM 1.66 (H) <1.00 mg/dL Final     Sedimentation Rate   Date/Time Value Ref Range Status   11/29/2024 06:43  (H) 0 - 30 mm/h Final       IMAGING:  X-Ray Right Foot 11/29  IMPRESSION:  Soft tissue ulceration distal second toe, with well-defined osteolysis  involving the mid portion of the adjacent distal phalanx    ASSESSMENT/PLAN:    Right 2nd Toe DIP Septic Arthritis and OM - this toe looks like needs to be amputated, does not appear salvageable  PAD  ESRD on HD via right femoral AVF    Transfer to [facility] for vascular evaluation given AVF in the right groin and signs of PAD related necrosis in the right toes. MRI right foot.    IV Vanc/Zosyn fine for now.    Monitoring for adverse effects of abx such as rash/itching/diarrhea.     Will follow peripherally over the weekend. Please call me with questions. Thanks! D/w Dr. Petra Page MD  [facility]  Office #[phone]

## 2024-12-01 VITALS
HEIGHT: 67 IN | DIASTOLIC BLOOD PRESSURE: 53 MMHG | RESPIRATION RATE: 18 BRPM | BODY MASS INDEX: 28.89 KG/M2 | SYSTOLIC BLOOD PRESSURE: 97 MMHG | OXYGEN SATURATION: 94 % | WEIGHT: 184.08 LBS | TEMPERATURE: 98.1 F | HEART RATE: 86 BPM

## 2024-12-01 LAB
ALBUMIN SERPL BCP-MCNC: 3.4 G/DL (ref 3.4–5)
ANION GAP SERPL CALC-SCNC: 16 MMOL/L (ref 10–20)
ANION GAP SERPL CALC-SCNC: 18 MMOL/L (ref 10–20)
BACTERIA BLD CULT: NORMAL
BACTERIA BLD CULT: NORMAL
BUN SERPL-MCNC: 38 MG/DL (ref 6–23)
BUN SERPL-MCNC: 42 MG/DL (ref 6–23)
CALCIUM SERPL-MCNC: 8.6 MG/DL (ref 8.6–10.3)
CALCIUM SERPL-MCNC: 8.7 MG/DL (ref 8.6–10.3)
CHLORIDE SERPL-SCNC: 90 MMOL/L (ref 98–107)
CHLORIDE SERPL-SCNC: 97 MMOL/L (ref 98–107)
CO2 SERPL-SCNC: 27 MMOL/L (ref 21–32)
CO2 SERPL-SCNC: 28 MMOL/L (ref 21–32)
CREAT SERPL-MCNC: 11.7 MG/DL (ref 0.5–1.05)
CREAT SERPL-MCNC: 13.18 MG/DL (ref 0.5–1.05)
EGFRCR SERPLBLD CKD-EPI 2021: 3 ML/MIN/1.73M*2
EGFRCR SERPLBLD CKD-EPI 2021: 4 ML/MIN/1.73M*2
GLUCOSE BLD MANUAL STRIP-MCNC: 146 MG/DL (ref 74–99)
GLUCOSE BLD MANUAL STRIP-MCNC: 151 MG/DL (ref 74–99)
GLUCOSE BLD MANUAL STRIP-MCNC: 176 MG/DL (ref 74–99)
GLUCOSE BLD MANUAL STRIP-MCNC: 183 MG/DL (ref 74–99)
GLUCOSE BLD MANUAL STRIP-MCNC: 184 MG/DL (ref 74–99)
GLUCOSE BLD MANUAL STRIP-MCNC: 27 MG/DL (ref 74–99)
GLUCOSE BLD MANUAL STRIP-MCNC: 92 MG/DL (ref 74–99)
GLUCOSE SERPL-MCNC: 114 MG/DL (ref 74–99)
GLUCOSE SERPL-MCNC: 168 MG/DL (ref 74–99)
HOLD SPECIMEN: NORMAL
PHOSPHATE SERPL-MCNC: 5.1 MG/DL (ref 2.5–4.9)
POTASSIUM SERPL-SCNC: 4.4 MMOL/L (ref 3.5–5.3)
POTASSIUM SERPL-SCNC: 4.8 MMOL/L (ref 3.5–5.3)
SODIUM SERPL-SCNC: 129 MMOL/L (ref 136–145)
SODIUM SERPL-SCNC: 138 MMOL/L (ref 136–145)

## 2024-12-01 PROCEDURE — 99233 SBSQ HOSP IP/OBS HIGH 50: CPT | Performed by: INTERNAL MEDICINE

## 2024-12-01 PROCEDURE — 2500000004 HC RX 250 GENERAL PHARMACY W/ HCPCS (ALT 636 FOR OP/ED): Performed by: INTERNAL MEDICINE

## 2024-12-01 PROCEDURE — 2500000002 HC RX 250 W HCPCS SELF ADMINISTERED DRUGS (ALT 637 FOR MEDICARE OP, ALT 636 FOR OP/ED): Performed by: PHYSICIAN ASSISTANT

## 2024-12-01 PROCEDURE — 2500000005 HC RX 250 GENERAL PHARMACY W/O HCPCS: Performed by: PHYSICIAN ASSISTANT

## 2024-12-01 PROCEDURE — 80048 BASIC METABOLIC PNL TOTAL CA: CPT | Performed by: PHYSICIAN ASSISTANT

## 2024-12-01 PROCEDURE — 2500000001 HC RX 250 WO HCPCS SELF ADMINISTERED DRUGS (ALT 637 FOR MEDICARE OP): Performed by: INTERNAL MEDICINE

## 2024-12-01 PROCEDURE — 2500000001 HC RX 250 WO HCPCS SELF ADMINISTERED DRUGS (ALT 637 FOR MEDICARE OP): Performed by: PHYSICIAN ASSISTANT

## 2024-12-01 PROCEDURE — 80048 BASIC METABOLIC PNL TOTAL CA: CPT | Mod: CCI | Performed by: INTERNAL MEDICINE

## 2024-12-01 PROCEDURE — 36415 COLL VENOUS BLD VENIPUNCTURE: CPT | Performed by: PHYSICIAN ASSISTANT

## 2024-12-01 PROCEDURE — 2060000001 HC INTERMEDIATE ICU ROOM DAILY

## 2024-12-01 PROCEDURE — 36415 COLL VENOUS BLD VENIPUNCTURE: CPT | Performed by: INTERNAL MEDICINE

## 2024-12-01 PROCEDURE — 2500000004 HC RX 250 GENERAL PHARMACY W/ HCPCS (ALT 636 FOR OP/ED): Performed by: PHYSICIAN ASSISTANT

## 2024-12-01 PROCEDURE — 80069 RENAL FUNCTION PANEL: CPT | Performed by: PHYSICIAN ASSISTANT

## 2024-12-01 PROCEDURE — 82947 ASSAY GLUCOSE BLOOD QUANT: CPT

## 2024-12-01 ASSESSMENT — COGNITIVE AND FUNCTIONAL STATUS - GENERAL
DAILY ACTIVITIY SCORE: 24
MOBILITY SCORE: 24
DAILY ACTIVITIY SCORE: 24
MOBILITY SCORE: 24

## 2024-12-01 ASSESSMENT — PAIN SCALES - GENERAL
PAINLEVEL_OUTOF10: 0 - NO PAIN
PAINLEVEL_OUTOF10: 5 - MODERATE PAIN

## 2024-12-01 ASSESSMENT — PAIN DESCRIPTION - LOCATION: LOCATION: HEAD

## 2024-12-01 ASSESSMENT — PAIN - FUNCTIONAL ASSESSMENT
PAIN_FUNCTIONAL_ASSESSMENT: 0-10
PAIN_FUNCTIONAL_ASSESSMENT: 0-10

## 2024-12-01 NOTE — PROGRESS NOTES
Rody Espinosa is a 53 y.o. female on day 2 of admission presenting with Osteomyelitis of right foot, unspecified type (Multi).      Subjective   Patient seen and examined.   JESICA overnight. Offers no new complaints.   Chart/labs/meds/notes/imaging/VS reviewed.       Objective          Vitals 24HR  Heart Rate:  [81-83]   Temp:  [36.1 °C (97 °F)-36.9 °C (98.4 °F)]   Resp:  [16-18]   BP: ()/(49-77)   SpO2:  [93 %-99 %]     Intake/Output last 3 Shifts:  No intake or output data in the 24 hours ending 12/01/24 0940    Physical Exam  GENERAL: Alert, and oriented x 3, no acute distress  SKIN: Skin color, texture, turgor normal.   HEAD: Atraumatic and normocephalic  EYES: PERRL, clear sclera  OROPHARYNX: Oropharynx normal.  NECK: No jugulovenous distention, No carotid bruits. Supple  Musculoskeletal: No synovitis.  LUNGS: Lungs clear to auscultation, Good diaphragmatic excursion.  CARDIAC: Normal S1 and S2; no rubs, murmurs, or gallops  ABDOMEN: Abdomen soft, non-tender, BS normal, No masses or organomegaly  EXTREMITIES: Right groin AV fistula within normal limits.  Chronic right arm edema. No leg edema. Right toe wound is covered.   NEURO: Cranial nerves II-XII intact.  Nonfocal.    Scheduled Medications  aspirin, 81 mg, oral, Daily  atorvastatin, 40 mg, oral, Nightly  docusate sodium, 100 mg, oral, BID  [Held by provider] glipiZIDE, 10 mg, oral, BID  heparin (porcine), 5,000 Units, subcutaneous, q8h  insulin lispro, 0-5 Units, subcutaneous, TID AC  metoprolol succinate XL, 25 mg, oral, Daily  midodrine, 5 mg, oral, q8h  pantoprazole, 40 mg, oral, BID AC   Or  pantoprazole, 40 mg, intravenous, BID AC  piperacillin-tazobactam, 2.25 g, intravenous, q8h  polyethylene glycol, 17 g, oral, Daily  pregabalin, 100 mg, oral, Nightly  sevelamer carbonate, 1,600 mg, oral, TID  timolol, 1 drop, ophthalmic (eye), Daily      Continuous medications       PRN medications: acetaminophen, dextrose, dextrose, glucagon, glucagon,  HYDROmorphone, HYDROmorphone, melatonin, ondansetron ODT **OR** ondansetron, vancomycin     Relevant Results  Results from last 7 days   Lab Units 11/30/24  0611 11/29/24  1843   WBC AUTO x10*3/uL 9.7 10.3   HEMOGLOBIN g/dL 10.2* 11.7*   HEMATOCRIT % 28.6* 33.4*   PLATELETS AUTO x10*3/uL 157 191   NEUTROS PCT AUTO %  --  54.9   LYMPHS PCT AUTO %  --  16.7   MONOS PCT AUTO %  --  10.6   EOS PCT AUTO %  --  16.8     Results from last 7 days   Lab Units 12/01/24  0519 11/30/24  0611 11/29/24  1843   SODIUM mmol/L 129* 136 137   POTASSIUM mmol/L 4.4 4.7 4.4   CHLORIDE mmol/L 90* 96* 93*   CO2 mmol/L 27 30 32   BUN mg/dL 38* 27* 20   CREATININE mg/dL 11.70* 9.77* 7.77*   GLUCOSE mg/dL 168* 62* 71*   CALCIUM mg/dL 8.7 9.3 9.1       XR foot right 3+ views   Final Result   Soft tissue ulceration distal second toe, with well-defined osteolysis   involving the mid portion of the adjacent distal phalanx   Signed by Flaco Tim MD      MR foot right wo IV contrast    (Results Pending)            Assessment/Plan      Rody Espinosa is a 53 y.o. female with a past medical history of ESRD status post failed renal transplant in 2001 who dialyzes Monday Wednesday Friday at [facility] via right thigh AV fistula who is under the care of Dr. Vega.  Ms. Espinosa dialyzes on F180 x 4 hours, BFR//800 mL/minute, dry weight of 83.5 kg.  She has a history of hypertension, dyslipidemia, coronary artery disease, MI in 2006 status post PCI with RUTH x 2, diabetes, obstructive sleep apnea, GERD, history of parathyroidectomy, prior SVC syndrome with chronic right arm swelling, AV fistula stenosis, and history of meningitis who presents for evaluation of a soft tissue ulcer noted to be discolored and painful for the last several months.  She reports a few days history with foul-smelling drainage.  She was recently seen by podiatry and plan for vascular studies, not yet completed.  She presented to the ED for further evaluation.   She is afebrile without leukocytosis.  X-ray of the foot showed soft tissue ulceration of the distal second toe.  There is well-defined osteolysis.  She was started on antimicrobial coverage.  Infectious disease and podiatry evaluation requested.  Nephrology was consulted for renal care.     Ms Espinosa was dialyzed at her unit on Friday uneventfully.  Her electrolytes and volume status are acceptable. Her sodium is registering lower today. I suspect that this is a lab error. I will however repeat an RFP later this evening for confirmation.  Her hemoglobin is at target.  She is ordered a phosphorus binder.  I will place orders for dialysis Monday.  I discussed her care with ID, they recommend transfer to [facility] for vascular evaulation.  Nephrology will follow while in house.         Principal Problem:    Osteomyelitis of right foot, unspecified type (Multi)  Active Problems:    Leg pain, anterior, right    Diabetic ulcer of toe of right foot associated with type 2 diabetes mellitus (Multi)    ESRD (end stage renal disease) on dialysis (Multi)    Controlled type 2 diabetes mellitus with diabetic polyneuropathy, without long-term current use of insulin      I spent 35 minutes in the professional and overall care of this patient.      Jay Loaiza, DO

## 2024-12-01 NOTE — CARE PLAN
Problem: Nutrition  Goal: Less than 5 days NPO/clear liquids  Outcome: Progressing  Goal: Oral intake greater than 50%  Outcome: Progressing  Goal: Oral intake greater 75%  Outcome: Progressing  Goal: Consume prescribed supplement  Outcome: Progressing  Goal: Adequate PO fluid intake  Outcome: Progressing  Goal: Nutrition support goals are met within 48 hrs  Outcome: Progressing  Goal: Nutrition support is meeting 75% of nutrient needs  Outcome: Progressing  Goal: Tube feed tolerance  Outcome: Progressing  Goal: BG  mg/dL  Outcome: Progressing  Goal: Lab values WNL  Outcome: Progressing  Goal: Electrolytes WNL  Outcome: Progressing  Goal: Promote healing  Outcome: Progressing  Goal: Maintain stable weight  Outcome: Progressing  Goal: Reduce weight from edema/fluid  Outcome: Progressing  Goal: Gradual weight gain  Outcome: Progressing  Goal: Improve ostomy output  Outcome: Progressing     Problem: Diabetes  Goal: Achieve decreasing blood glucose levels by end of shift  Outcome: Progressing  Goal: Increase stability of blood glucose readings by end of shift  Outcome: Progressing  Goal: Decrease in ketones present in urine by end of shift  Outcome: Progressing  Goal: Maintain electrolyte levels within acceptable range throughout shift  Outcome: Progressing  Goal: Maintain glucose levels >70mg/dl to <250mg/dl throughout shift  Outcome: Progressing  Goal: No changes in neurological exam by end of shift  Outcome: Progressing  Goal: Learn about and adhere to nutrition recommendations by end of shift  Outcome: Progressing  Goal: Vital signs within normal range for age by end of shift  Outcome: Progressing  Goal: Increase self care and/or family involovement by end of shift  Outcome: Progressing  Goal: Receive DSME education by end of shift  Outcome: Progressing     Problem: Skin  Goal: Prevent/manage excess moisture  Outcome: Progressing   The patient's goals for the shift include no amputation    The clinical goals  for the shift include pt.will maintain stable blood glucose    Over the shift, the patient did not make progress toward the following goals. Barriers to progression include . Recommendations to address these barriers include .

## 2024-12-01 NOTE — PROGRESS NOTES
"Rody Espinosa is a 53 y.o. female on day 2 of admission presenting with Osteomyelitis of right foot, unspecified type (Multi).    Subjective   No acute events overnight. Pain continues to be controlled. Pods and vascular discussed with her, she needs to transfer to [facility] for vascular surgery eval as she has a fem graft or fistula on the R that would be too complicated to deal with here.        Objective     VITALS  Blood pressure 121/69, pulse 83, temperature 36.4 °C (97.5 °F), temperature source Oral, resp. rate 18, height 1.702 m (5' 7\"), weight 83.5 kg (184 lb 1.4 oz), SpO2 98%.  Physical Exam  Vitals and nursing note reviewed.   Constitutional:       General: She is not in acute distress.     Appearance: Normal appearance.   HENT:      Head: Normocephalic and atraumatic.   Cardiovascular:      Rate and Rhythm: Normal rate and regular rhythm.      Heart sounds: Normal heart sounds.   Pulmonary:      Effort: Pulmonary effort is normal. No respiratory distress.      Breath sounds: Normal breath sounds. No wheezing.   Abdominal:      General: Abdomen is flat. Bowel sounds are normal. There is no distension.      Palpations: Abdomen is soft.      Tenderness: There is no abdominal tenderness.   Musculoskeletal:         General: No swelling or tenderness. Normal range of motion.   Skin:     General: Skin is warm and dry.      Capillary Refill: Capillary refill takes less than 2 seconds.      Comments: R foot w/ necrotic ulceration to the second toe w/o purulent drainage. Second toe w/ slight soft tissue swelling   Neurological:      General: No focal deficit present.      Mental Status: She is alert and oriented to person, place, and time.   Psychiatric:         Mood and Affect: Mood normal.           Intake/Output last 3 Shifts:  I/O last 3 completed shifts:  In: 1650 (19.8 mL/kg) [IV Piggyback:1650]  Out: - (0 mL/kg)   Weight: 83.5 kg     Relevant Results  Results for orders placed or performed during the hospital " encounter of 11/29/24 (from the past 24 hours)   POCT GLUCOSE   Result Value Ref Range    POCT Glucose 66 (L) 74 - 99 mg/dL   POCT GLUCOSE   Result Value Ref Range    POCT Glucose 80 74 - 99 mg/dL   POCT GLUCOSE   Result Value Ref Range    POCT Glucose 51 (L) 74 - 99 mg/dL   POCT GLUCOSE   Result Value Ref Range    POCT Glucose 47 (L) 74 - 99 mg/dL   POCT GLUCOSE   Result Value Ref Range    POCT Glucose 159 (H) 74 - 99 mg/dL   POCT GLUCOSE   Result Value Ref Range    POCT Glucose 92 74 - 99 mg/dL   POCT GLUCOSE   Result Value Ref Range    POCT Glucose 27 (L) 74 - 99 mg/dL   POCT GLUCOSE   Result Value Ref Range    POCT Glucose 176 (H) 74 - 99 mg/dL   Lavender Top   Result Value Ref Range    Extra Tube Hold for add-ons.    Basic Metabolic Panel   Result Value Ref Range    Glucose 168 (H) 74 - 99 mg/dL    Sodium 129 (L) 136 - 145 mmol/L    Potassium 4.4 3.5 - 5.3 mmol/L    Chloride 90 (L) 98 - 107 mmol/L    Bicarbonate 27 21 - 32 mmol/L    Anion Gap 16 10 - 20 mmol/L    Urea Nitrogen 38 (H) 6 - 23 mg/dL    Creatinine 11.70 (H) 0.50 - 1.05 mg/dL    eGFR 4 (L) >60 mL/min/1.73m*2    Calcium 8.7 8.6 - 10.3 mg/dL   POCT GLUCOSE   Result Value Ref Range    POCT Glucose 183 (H) 74 - 99 mg/dL       Imaging Results  XR foot right 3+ views   Final Result   Soft tissue ulceration distal second toe, with well-defined osteolysis   involving the mid portion of the adjacent distal phalanx   Signed by Flaco Tim MD      MR foot right wo IV contrast    (Results Pending)       Medications:  aspirin, 81 mg, oral, Daily  atorvastatin, 40 mg, oral, Nightly  docusate sodium, 100 mg, oral, BID  [Held by provider] glipiZIDE, 10 mg, oral, BID  heparin (porcine), 5,000 Units, subcutaneous, q8h  insulin lispro, 0-5 Units, subcutaneous, TID AC  metoprolol succinate XL, 25 mg, oral, Daily  midodrine, 5 mg, oral, q8h  pantoprazole, 40 mg, oral, BID AC   Or  pantoprazole, 40 mg, intravenous, BID AC  piperacillin-tazobactam, 2.25 g, intravenous,  q8h  polyethylene glycol, 17 g, oral, Daily  pregabalin, 100 mg, oral, Nightly  sevelamer carbonate, 1,600 mg, oral, TID  timolol, 1 drop, ophthalmic (eye), Daily       PRN medications: acetaminophen, dextrose, dextrose, glucagon, glucagon, HYDROmorphone, HYDROmorphone, melatonin, ondansetron ODT **OR** ondansetron, vancomycin        Assessment/Plan          Principal Problem:    Osteomyelitis of right foot, unspecified type (Multi)  Active Problems:    Leg pain, anterior, right    Diabetic ulcer of toe of right foot associated with type 2 diabetes mellitus (Multi)    ESRD (end stage renal disease) on dialysis (Multi)    Controlled type 2 diabetes mellitus with diabetic polyneuropathy, without long-term current use of insulin    Osteomyelitis of R foot  -Continue with vanc and Zosyn  -Consult Pods, discussed with pods, [place] [facility] [place]. Dr. Casanova from vascular here agrees.   -ID following, appreciate their recs   -IV pain control     DM type II  -Corrective insulin   -Hypoglycemic protocol     ESRD  -Nephro following for dialysis   -Sevelamer       DVT Prophylaxis:  Heparin subq    Disposition:  Transfer requested.     Solomon Deleon, [facility]

## 2024-12-02 ENCOUNTER — APPOINTMENT (OUTPATIENT)
Dept: RADIOLOGY | Facility: HOSPITAL | Age: 54
End: 2024-12-02
Payer: COMMERCIAL

## 2024-12-02 ENCOUNTER — APPOINTMENT (OUTPATIENT)
Dept: DIALYSIS | Facility: HOSPITAL | Age: 54
End: 2024-12-02
Payer: COMMERCIAL

## 2024-12-02 LAB
ANION GAP SERPL CALC-SCNC: 21 MMOL/L (ref 10–20)
BUN SERPL-MCNC: 49 MG/DL (ref 6–23)
CALCIUM SERPL-MCNC: 9.5 MG/DL (ref 8.6–10.3)
CHLORIDE SERPL-SCNC: 98 MMOL/L (ref 98–107)
CO2 SERPL-SCNC: 27 MMOL/L (ref 21–32)
CREAT SERPL-MCNC: 14.67 MG/DL (ref 0.5–1.05)
EGFRCR SERPLBLD CKD-EPI 2021: 3 ML/MIN/1.73M*2
ERYTHROCYTE [DISTWIDTH] IN BLOOD BY AUTOMATED COUNT: 14.6 % (ref 11.5–14.5)
GLUCOSE BLD MANUAL STRIP-MCNC: 126 MG/DL (ref 74–99)
GLUCOSE BLD MANUAL STRIP-MCNC: 140 MG/DL (ref 74–99)
GLUCOSE BLD MANUAL STRIP-MCNC: 85 MG/DL (ref 74–99)
GLUCOSE SERPL-MCNC: 76 MG/DL (ref 74–99)
HCT VFR BLD AUTO: 30.3 % (ref 36–46)
HGB BLD-MCNC: 10.3 G/DL (ref 12–16)
MCH RBC QN AUTO: 30.7 PG (ref 26–34)
MCHC RBC AUTO-ENTMCNC: 34 G/DL (ref 32–36)
MCV RBC AUTO: 90 FL (ref 80–100)
NRBC BLD-RTO: 0 /100 WBCS (ref 0–0)
PLATELET # BLD AUTO: 177 X10*3/UL (ref 150–450)
POTASSIUM SERPL-SCNC: 5.5 MMOL/L (ref 3.5–5.3)
RBC # BLD AUTO: 3.36 X10*6/UL (ref 4–5.2)
SODIUM SERPL-SCNC: 140 MMOL/L (ref 136–145)
VANCOMYCIN SERPL-MCNC: 28.1 UG/ML (ref 5–20)
WBC # BLD AUTO: 9.2 X10*3/UL (ref 4.4–11.3)

## 2024-12-02 PROCEDURE — 2500000001 HC RX 250 WO HCPCS SELF ADMINISTERED DRUGS (ALT 637 FOR MEDICARE OP): Performed by: INTERNAL MEDICINE

## 2024-12-02 PROCEDURE — 1100000001 HC PRIVATE ROOM DAILY

## 2024-12-02 PROCEDURE — 2500000002 HC RX 250 W HCPCS SELF ADMINISTERED DRUGS (ALT 637 FOR MEDICARE OP, ALT 636 FOR OP/ED): Performed by: PHYSICIAN ASSISTANT

## 2024-12-02 PROCEDURE — 2500000004 HC RX 250 GENERAL PHARMACY W/ HCPCS (ALT 636 FOR OP/ED): Performed by: PHYSICIAN ASSISTANT

## 2024-12-02 PROCEDURE — 80048 BASIC METABOLIC PNL TOTAL CA: CPT | Performed by: PHYSICIAN ASSISTANT

## 2024-12-02 PROCEDURE — 85027 COMPLETE CBC AUTOMATED: CPT | Performed by: INTERNAL MEDICINE

## 2024-12-02 PROCEDURE — 80202 ASSAY OF VANCOMYCIN: CPT | Performed by: PHYSICIAN ASSISTANT

## 2024-12-02 PROCEDURE — 2500000001 HC RX 250 WO HCPCS SELF ADMINISTERED DRUGS (ALT 637 FOR MEDICARE OP): Performed by: PHYSICIAN ASSISTANT

## 2024-12-02 PROCEDURE — 36415 COLL VENOUS BLD VENIPUNCTURE: CPT | Performed by: INTERNAL MEDICINE

## 2024-12-02 PROCEDURE — 2500000004 HC RX 250 GENERAL PHARMACY W/ HCPCS (ALT 636 FOR OP/ED): Performed by: INTERNAL MEDICINE

## 2024-12-02 PROCEDURE — 90935 HEMODIALYSIS ONE EVALUATION: CPT | Performed by: INTERNAL MEDICINE

## 2024-12-02 PROCEDURE — 5A1D70Z PERFORMANCE OF URINARY FILTRATION, INTERMITTENT, LESS THAN 6 HOURS PER DAY: ICD-10-PCS | Performed by: INTERNAL MEDICINE

## 2024-12-02 PROCEDURE — 99232 SBSQ HOSP IP/OBS MODERATE 35: CPT | Performed by: INTERNAL MEDICINE

## 2024-12-02 PROCEDURE — 8010000001 HC DIALYSIS - HEMODIALYSIS PER DAY

## 2024-12-02 PROCEDURE — 82947 ASSAY GLUCOSE BLOOD QUANT: CPT

## 2024-12-02 RX ORDER — VANCOMYCIN HYDROCHLORIDE 750 MG/150ML
750 INJECTION, SOLUTION INTRAVENOUS ONCE
Status: COMPLETED | OUTPATIENT
Start: 2024-12-02 | End: 2024-12-02

## 2024-12-02 RX ORDER — GLIPIZIDE 5 MG/1
5 TABLET ORAL 2 TIMES DAILY
Status: DISCONTINUED | OUTPATIENT
Start: 2024-12-02 | End: 2024-12-03 | Stop reason: HOSPADM

## 2024-12-02 RX ORDER — DIPHENHYDRAMINE HCL 25 MG
25 CAPSULE ORAL EVERY 6 HOURS PRN
Status: DISCONTINUED | OUTPATIENT
Start: 2024-12-02 | End: 2024-12-03 | Stop reason: HOSPADM

## 2024-12-02 ASSESSMENT — PAIN - FUNCTIONAL ASSESSMENT
PAIN_FUNCTIONAL_ASSESSMENT: 0-10

## 2024-12-02 ASSESSMENT — COGNITIVE AND FUNCTIONAL STATUS - GENERAL
DAILY ACTIVITIY SCORE: 24
MOBILITY SCORE: 24
MOBILITY SCORE: 24
DAILY ACTIVITIY SCORE: 24

## 2024-12-02 ASSESSMENT — PAIN SCALES - GENERAL
PAINLEVEL_OUTOF10: 0 - NO PAIN
PAINLEVEL_OUTOF10: 2
PAINLEVEL_OUTOF10: 0 - NO PAIN
PAINLEVEL_OUTOF10: 10 - WORST POSSIBLE PAIN
PAINLEVEL_OUTOF10: 0 - NO PAIN
PAINLEVEL_OUTOF10: 0 - NO PAIN
PAINLEVEL_OUTOF10: 8

## 2024-12-02 ASSESSMENT — PAIN DESCRIPTION - LOCATION: LOCATION: FOOT

## 2024-12-02 NOTE — NURSING NOTE
Returned to unit from dialysis. R/groin dressing clean, dry, intact with thrill and bruit present. Patient safety maintained.

## 2024-12-02 NOTE — PROCEDURES
"Hemodialysis Note    Patient seen and examined while on dialysis, recent events, labs, medications reviewed.   Tolerating well, target UF 2L  /55 (BP Location: Left leg, Patient Position: Sitting)   Pulse 77   Temp 36.6 °C (97.9 °F)   Resp 18   Ht 1.702 m (5' 7\")   Wt 84.5 kg (186 lb 4.6 oz)   SpO2 95%   BMI 29.18 kg/m²      Using R fem AVG  Hb >10  Midodrine for BP support  On sevelamer    HD now, next session Wednesday keeping MWF schedule     Will continue to follow, overall management per primary team, and continue regular dialysis.      Kevin Gongora MD  Division of Nephrology and Hypertension    "

## 2024-12-02 NOTE — PROGRESS NOTES
12/02/24 1620   Discharge Planning   Expected Discharge Disposition   ([place] to [facility])        Patient is on IV abx for OM of right foot. ID is following. She had dialysis done today, tolerated well. MRI of right foot is ordered. Patient is waiting on bed to transfer to [facility] for vascular. Will continue to follow for discharge needs.

## 2024-12-02 NOTE — PRE-PROCEDURE NOTE
Report from Sending RN:    Report From: Linda BROOKS   Recent Surgery of Procedure: No  Baseline Level of Consciousness (LOC): x4  Oxygen Use: No  Type: na  Diabetic: Yes  Last BP Med Given Day of Dialysis: see emar  Last Pain Med Given: see emar   Lab Tests to be Obtained with Dialysis: No  Blood Transfusion to be Given During Dialysis: No  Available IV Access: Yes  Medications to be Administered During Dialysis: No  Continuous IV Infusion Running: No  Restraints on Currently or in the Last 24 Hours: No  Hand-Off Communication: stable for hd   Dialysis Catheter Dressing: leonel  Last Dressing Change: na

## 2024-12-02 NOTE — PROGRESS NOTES
Vancomycin Dosing by Pharmacy- FOLLOW UP    Rody Espinosa is a 53 y.o. year old female who Pharmacy has been consulted for vancomycin dosing for osteomyelitis/septic arthritis. Based on the patient's indication and renal status this patient is being dosed based on a goal AUC of 400-600.     Renal function is currently declining.    Current vancomycin dose: Received 2000 mg loading dose in ED.    Most recent random level: 28.1 mcg/mL    Visit Vitals  /55 (BP Location: Left leg, Patient Position: Sitting)   Pulse 77   Temp 36.6 °C (97.9 °F)   Resp 18        Lab Results   Component Value Date    CREATININE 14.67 (H) 2024    CREATININE 13.18 (H) 2024    CREATININE 11.70 (H) 2024    CREATININE 9.77 (H) 2024        Patient weight is as follows:   Vitals:    24 0600   Weight: 84.5 kg (186 lb 4.6 oz)       Cultures:  No results found for the encounter in last 14 days.       I/O last 3 completed shifts:  In: 330 (3.9 mL/kg) [P.O.:180; IV Piggyback:150]  Out: 0 (0 mL/kg)   Weight: 84.5 kg   I/O during current shift:  No intake/output data recorded.    Temp (24hrs), Av.7 °C (98 °F), Min:36 °C (96.8 °F), Max:37.3 °C (99.1 °F)      Assessment/Plan    Give 750 mg once today after HD  The next level will be obtained on  at 0500. May be obtained sooner if clinically indicated.   Will continue to monitor renal function daily while on vancomycin and order serum creatinine at least every 48 hours if not already ordered.  Follow for continued vancomycin needs, clinical response, and signs/symptoms of toxicity.       Laura Jones, Sen[facility]

## 2024-12-02 NOTE — CONSULTS
"Nutrition Assessment Note  Nutrition Assessment      Reason for Assessment  Reason for Assessment: Admission nursing screening    Attempted to meet with pt, pt sleeping, room dark.     Pt admitted 2/2 foul smelling drainage from R foot second toe, osteomyelitis of R foot.   PMH extensive, includes DM, ESRD on HD, blind in L eye, MI, CAD, osteoporosis.     [place] [facility] [place]     History:  Food and Nutrient History  Energy Intake: Fair 50-75 %  Food and Nutrient History: per nursing       Dietary Orders (From admission, onward)       Start     Ordered    11/30/24 0141  May Participate in Room Service  ( ROOM SERVICE MAY PARTICIPATE)  Once        Question:  .  Answer:  Yes    11/30/24 0140 11/29/24 2219  Adult diet Cardiac, Renal, Consistent Carb; CCD 75 gm/meal; 70 gm fat; 2 - 3 grams Sodium; Potassium Restricted 2 gm (50mEq)  Diet effective now        Question Answer Comment   Diet type Cardiac    Diet type Renal    Diet type Consistent Carb    Carb diet selection: CCD 75 gm/meal    Fat restriction: 70 gm fat    Sodium restriction: 2 - 3 grams Sodium    Potassium restriction: Potassium Restricted 2 gm (50mEq)        11/29/24 2218                      Anthropometrics:  Height: 170.2 cm (5' 7.01\")  Weight: 84.5 kg (186 lb 4.6 oz)  BMI (Calculated): 29.17      Weight Change  Significant Weight Loss: No    Wt Readings from Last 20 Encounters:   12/02/24 84.5 kg (186 lb 4.6 oz)   09/13/24 84.9 kg (187 lb 4 oz)   02/21/24 85.6 kg (188 lb 12.8 oz)   01/19/24 87.1 kg (192 lb 1.6 oz)   10/26/23 85 kg (187 lb 6.3 oz)   08/21/23 85.8 kg (189 lb 1 oz)   03/13/23 87.3 kg (192 lb 8 oz)   11/23/22 87.1 kg (192 lb)   11/16/22 85.3 kg (188 lb)   11/10/21 87.2 kg (192 lb 3.2 oz)   12/02/20 86.9 kg (191 lb 9.6 oz)   11/11/20 87.6 kg (193 lb 1.6 oz)     IBW/kg (Dietitian Calculated): 61.4 kg     Scheduled medications  aspirin, 81 mg, oral, Daily  atorvastatin, 40 mg, oral, Nightly  docusate sodium, 100 " "mg, oral, BID  [Held by provider] glipiZIDE, 5 mg, oral, BID  heparin (porcine), 5,000 Units, subcutaneous, q8h  insulin lispro, 0-5 Units, subcutaneous, TID AC  metoprolol succinate XL, 25 mg, oral, Daily  midodrine, 5 mg, oral, q8h  pantoprazole, 40 mg, oral, BID AC   Or  pantoprazole, 40 mg, intravenous, BID AC  piperacillin-tazobactam, 2.25 g, intravenous, q8h  polyethylene glycol, 17 g, oral, Daily  pregabalin, 100 mg, oral, Nightly  sevelamer carbonate, 1,600 mg, oral, TID  timolol, 1 drop, ophthalmic (eye), Daily      Continuous medications     PRN medications  PRN medications: acetaminophen, dextrose, dextrose, diphenhydrAMINE, glucagon, glucagon, HYDROmorphone, HYDROmorphone, melatonin, ondansetron ODT **OR** ondansetron, vancomycin     Latest Reference Range & Units 12/02/24 05:45   GLUCOSE 74 - 99 mg/dL 76   SODIUM 136 - 145 mmol/L 140   POTASSIUM 3.5 - 5.3 mmol/L 5.5 (H)   CHLORIDE 98 - 107 mmol/L 98   Bicarbonate 21 - 32 mmol/L 27   Anion Gap 10 - 20 mmol/L 21 (H)   Blood Urea Nitrogen 6 - 23 mg/dL 49 (H)   Creatinine 0.50 - 1.05 mg/dL 14.67 (H)   EGFR >60 mL/min/1.73m*2 3 (L)   Calcium 8.6 - 10.3 mg/dL 9.5   (H): Data is abnormally high  (L): Data is abnormally low       Latest Reference Range & Units 11/29/24 18:43   Hemoglobin A1C See comment % 5.7 (H)   (H): Data is abnormally high      I/O last 3 completed shifts:  In: 330 (3.9 mL/kg) [P.O.:180; IV Piggyback:150]  Out: 0 (0 mL/kg)   Weight: 84.5 kg   I/O this shift:  In: 450 [Other:400; IV Piggyback:50]  Out: 1900 [Other:1900]  X2 BM 12/1/24      Energy Needs:  Calculated Energy Needs Using Equations  Height: 170.2 cm (5' 7.01\")  Temp: 36.1 °C (97 °F)    Estimated Energy Needs  Total Energy Estimated Needs (kCal): 2500 kCal  Total Estimated Energy Need per Day (kCal/kg): 30 kCal/kg    Estimated Protein Needs  Total Protein Estimated Needs (g): 90 g  Total Protein Estimated Needs (g/kg): 1.5 g/kg (IBW)    Estimated Fluid Needs  Method for Estimating " Needs: per MD         Nutrition Focused Physical Findings:  Subcutaneous Fat Loss  Orbital Fat Pads: Well nourished (slightly bulging fat pads)  Buccal Fat Pads: Well nourished (full, rounded cheeks)  Triceps: Defer  Ribs: Defer    Muscle Wasting  Temporalis: Mild-Moderate (slight depression)    Edema  Edema: +1 trace         Physical Findings (Nutrition Deficiency/Toxicity)  Skin: Negative       Nutrition Diagnosis   Malnutrition Diagnosis  Patient has Malnutrition Diagnosis: No    Patient has Nutrition Diagnosis: Yes  Nutrition Diagnosis 1: Increased nutrient needs  Diagnosis Status (1): New  Related to (1): osteomyelitis of right foot, ESRD on HD         Nutrition Interventions/Recommendations   Nutrition Prescription  Individualized Nutrition Prescription Provided for : liberalize diet as appropriate, add Nepro BID 2/2 increased metabolic demands  - nephrocaps one tablet daily   - monitor wt trend, oral intake     Food and/or Nutrient Delivery Interventions  Meals and Snacks: Mineral-modified diet  Goal: oral intake greater than 75% of meals         Coordination of Nutrition Care by a Nutrition Professional  Collaboration and Referral of Nutrition Care: Collaboration by nutrition professional with other providers    Education Documentation  No documentation found.       Nutrition Monitoring and Evaluation   Food and Nutrient Related History  Energy Intake: Estimated energy intake  Criteria: greater than 75% of meals    Fluid Intake: Estimated fluid intake  Criteria: monitor closely, at least 75% of daily fluid recommendations        Anthropometrics: Body Composition/Growth/Weight History  Weight: Weight change  Criteria: daily wts, monitor trend         Biochemical Data, Medical Tests and Procedures  Electrolyte and Renal Panel: BUN, Sodium, Calcium, serum, Chloride, Creatinine, Magnesium, Phosphorus, Potassium  Criteria: daily    Gastrointestinal Profile: Alanine aminotransferase (ALT), Alkaline phosphatase,  Bilirubin, total, Aspartate aminotransferase (AST)  Criteria: as per MD    Glucose/Endocrine Profile: Glucose, casual  Criteria: daily, or as per MD    Nutritional Anemia Profile: Hematocrit, Hemoglobin, Iron, serum  Criteria: as per MD    Vitamin Profile: Vitamin D, 25 hydroxy  Criteria: as indicated    Nutrition Focused Physical Findings  Adipose: Other (Comment)  Criteria: monitor NFPE    Bones: Other (Comment)  Criteria: monitor skeletal integrity    Digestive System: Abdominal distension, Increased appetite, Nausea, Vomiting, Anorexia, Ascites, Constipation, Decrease in appetite, Diarrhea, Early satiety, Other (Comment)  Criteria: monitor GI function closely    Muscles: Muscle atrophy  Criteria: monitor NFPE    Skin: Other (Comment)  Criteria: monitor skin integrity closely         Follow Up  Time Spent (min): 45 minutes  Last Date of Nutrition Visit: 12/02/24  Nutrition Follow-Up Needed?: Dietitian to reassess per policy  Follow up Comment: TORRES WALSH

## 2024-12-02 NOTE — POST-PROCEDURE NOTE
Report to Receiving RN:    Report To: Linda BROOKS   Time Report Called: 1250  Hand-Off Communication: stable post hd 1.5 liters net removed no issues on HD     Complications During Treatment: No  Ultrafiltration Treatment: No  Medications Administered During Dialysis: No  Blood Products Administered During Dialysis: No  Labs Sent During Dialysis: No  Heparin Drip Rate Changes: No  Dialysis Catheter Dressing: na  Last Dressing Change: na    Electronic Signatures:  Sin Sow RN  (Signed )   Authored:    (Signed )   Authored:     Last Updated: 12:52 PM by SIN SOW

## 2024-12-02 NOTE — PROGRESS NOTES
"Rody Espinosa is a 53 y.o. female on day 3 of admission presenting with Osteomyelitis of right foot, unspecified type (Multi).    Subjective   No acute events overnight. See in the dialysis suite. Awaiting transfer to [facility] [place]. Pain well controlled.        Objective     VITALS  Blood pressure 124/55, pulse 77, temperature 36.6 °C (97.9 °F), resp. rate 18, height 1.702 m (5' 7\"), weight 84.5 kg (186 lb 4.6 oz), SpO2 95%.  Physical Exam  Vitals and nursing note reviewed.   Constitutional:       General: She is not in acute distress.     Appearance: Normal appearance.   HENT:      Head: Normocephalic and atraumatic.   Cardiovascular:      Rate and Rhythm: Normal rate and regular rhythm.      Heart sounds: Normal heart sounds.   Pulmonary:      Effort: Pulmonary effort is normal. No respiratory distress.      Breath sounds: Normal breath sounds. No wheezing.   Abdominal:      General: Abdomen is flat. Bowel sounds are normal. There is no distension.      Palpations: Abdomen is soft.      Tenderness: There is no abdominal tenderness.   Musculoskeletal:         General: No swelling or tenderness. Normal range of motion.   Skin:     General: Skin is warm and dry.      Capillary Refill: Capillary refill takes less than 2 seconds.      Comments: R foot w/ necrotic ulceration to the second toe w/o purulent drainage. Second toe w/ slight soft tissue swelling   Neurological:      General: No focal deficit present.      Mental Status: She is alert and oriented to person, place, and time.   Psychiatric:         Mood and Affect: Mood normal.           Intake/Output last 3 Shifts:  I/O last 3 completed shifts:  In: 330 (3.9 mL/kg) [P.O.:180; IV Piggyback:150]  Out: 0 (0 mL/kg)   Weight: 84.5 kg     Relevant Results  Results for orders placed or performed during the hospital encounter of 11/29/24 (from the past 24 hours)   POCT GLUCOSE   Result Value Ref Range    POCT Glucose 184 (H) 74 - 99 mg/dL   Renal function panel   Result " Value Ref Range    Glucose 114 (H) 74 - 99 mg/dL    Sodium 138 136 - 145 mmol/L    Potassium 4.8 3.5 - 5.3 mmol/L    Chloride 97 (L) 98 - 107 mmol/L    Bicarbonate 28 21 - 32 mmol/L    Anion Gap 18 10 - 20 mmol/L    Urea Nitrogen 42 (H) 6 - 23 mg/dL    Creatinine 13.18 (H) 0.50 - 1.05 mg/dL    eGFR 3 (L) >60 mL/min/1.73m*2    Calcium 8.6 8.6 - 10.3 mg/dL    Phosphorus 5.1 (H) 2.5 - 4.9 mg/dL    Albumin 3.4 3.4 - 5.0 g/dL   POCT GLUCOSE   Result Value Ref Range    POCT Glucose 151 (H) 74 - 99 mg/dL   POCT GLUCOSE   Result Value Ref Range    POCT Glucose 146 (H) 74 - 99 mg/dL   Vancomycin   Result Value Ref Range    Vancomycin 28.1 (H) 5.0 - 20.0 ug/mL   Basic Metabolic Panel   Result Value Ref Range    Glucose 76 74 - 99 mg/dL    Sodium 140 136 - 145 mmol/L    Potassium 5.5 (H) 3.5 - 5.3 mmol/L    Chloride 98 98 - 107 mmol/L    Bicarbonate 27 21 - 32 mmol/L    Anion Gap 21 (H) 10 - 20 mmol/L    Urea Nitrogen 49 (H) 6 - 23 mg/dL    Creatinine 14.67 (H) 0.50 - 1.05 mg/dL    eGFR 3 (L) >60 mL/min/1.73m*2    Calcium 9.5 8.6 - 10.3 mg/dL   CBC   Result Value Ref Range    WBC 9.2 4.4 - 11.3 x10*3/uL    nRBC 0.0 0.0 - 0.0 /100 WBCs    RBC 3.36 (L) 4.00 - 5.20 x10*6/uL    Hemoglobin 10.3 (L) 12.0 - 16.0 g/dL    Hematocrit 30.3 (L) 36.0 - 46.0 %    MCV 90 80 - 100 fL    MCH 30.7 26.0 - 34.0 pg    MCHC 34.0 32.0 - 36.0 g/dL    RDW 14.6 (H) 11.5 - 14.5 %    Platelets 177 150 - 450 x10*3/uL   POCT GLUCOSE   Result Value Ref Range    POCT Glucose 126 (H) 74 - 99 mg/dL       Imaging Results  XR foot right 3+ views   Final Result   Soft tissue ulceration distal second toe, with well-defined osteolysis   involving the mid portion of the adjacent distal phalanx   Signed by Flaco Tim MD      MR foot right wo IV contrast    (Results Pending)       Medications:  aspirin, 81 mg, oral, Daily  atorvastatin, 40 mg, oral, Nightly  docusate sodium, 100 mg, oral, BID  [Held by provider] glipiZIDE, 10 mg, oral, BID  heparin (porcine), 5,000  Units, subcutaneous, q8h  insulin lispro, 0-5 Units, subcutaneous, TID AC  metoprolol succinate XL, 25 mg, oral, Daily  midodrine, 5 mg, oral, q8h  pantoprazole, 40 mg, oral, BID AC   Or  pantoprazole, 40 mg, intravenous, BID AC  piperacillin-tazobactam, 2.25 g, intravenous, q8h  polyethylene glycol, 17 g, oral, Daily  pregabalin, 100 mg, oral, Nightly  sevelamer carbonate, 1,600 mg, oral, TID  timolol, 1 drop, ophthalmic (eye), Daily       PRN medications: acetaminophen, dextrose, dextrose, glucagon, glucagon, HYDROmorphone, HYDROmorphone, melatonin, ondansetron ODT **OR** ondansetron, vancomycin        Assessment/Plan          Principal Problem:    Osteomyelitis of right foot, unspecified type (Multi)  Active Problems:    Leg pain, anterior, right    Diabetic ulcer of toe of right foot associated with type 2 diabetes mellitus (Multi)    ESRD (end stage renal disease) on dialysis (Multi)    Controlled type 2 diabetes mellitus with diabetic polyneuropathy, without long-term current use of insulin    Osteomyelitis of R foot  -Continue with vanc and Zosyn  -Consult Pods, discussed with pods, needs to transfer to [facility] [place]. Dr. Casanova from vascular here agrees.   -ID following, appreciate their recs   -IV pain control     DM type II  -Corrective insulin   -Hypoglycemic protocol     ESRD  -Nephro following for dialysis   -Sevelamer       DVT Prophylaxis:  Heparin subq    Disposition:  Transfer requested.     Solomon Deleon, [facility]

## 2024-12-03 ENCOUNTER — HOSPITAL ENCOUNTER (INPATIENT)
Facility: HOSPITAL | Age: 54
LOS: 3 days | Discharge: HOME | End: 2024-12-06
Attending: STUDENT IN AN ORGANIZED HEALTH CARE EDUCATION/TRAINING PROGRAM | Admitting: INTERNAL MEDICINE
Payer: COMMERCIAL

## 2024-12-03 ENCOUNTER — APPOINTMENT (OUTPATIENT)
Dept: VASCULAR MEDICINE | Facility: HOSPITAL | Age: 54
End: 2024-12-03
Payer: COMMERCIAL

## 2024-12-03 VITALS
HEART RATE: 84 BPM | SYSTOLIC BLOOD PRESSURE: 114 MMHG | TEMPERATURE: 97.9 F | DIASTOLIC BLOOD PRESSURE: 67 MMHG | BODY MASS INDEX: 29.24 KG/M2 | WEIGHT: 186.29 LBS | OXYGEN SATURATION: 94 % | RESPIRATION RATE: 18 BRPM | HEIGHT: 67 IN

## 2024-12-03 DIAGNOSIS — R09.89 OTHER SPECIFIED SYMPTOMS AND SIGNS INVOLVING THE CIRCULATORY AND RESPIRATORY SYSTEMS: ICD-10-CM

## 2024-12-03 DIAGNOSIS — I73.9 PERIPHERAL VASCULAR DISEASE, UNSPECIFIED (CMS-HCC): ICD-10-CM

## 2024-12-03 DIAGNOSIS — M86.9 OSTEOMYELITIS OF RIGHT FOOT, UNSPECIFIED TYPE (MULTI): ICD-10-CM

## 2024-12-03 DIAGNOSIS — M86.271 SUBACUTE OSTEOMYELITIS OF RIGHT FOOT (MULTI): ICD-10-CM

## 2024-12-03 DIAGNOSIS — I96 GANGRENE, NOT ELSEWHERE CLASSIFIED: ICD-10-CM

## 2024-12-03 DIAGNOSIS — M86.9 OSTEOMYELITIS: Primary | ICD-10-CM

## 2024-12-03 DIAGNOSIS — N18.6 END STAGE RENAL DISEASE (MULTI): ICD-10-CM

## 2024-12-03 PROBLEM — E08.621: Status: ACTIVE | Noted: 2024-12-03

## 2024-12-03 PROBLEM — I95.3 HEMODIALYSIS-ASSOCIATED HYPOTENSION: Status: ACTIVE | Noted: 2019-05-24

## 2024-12-03 PROBLEM — L97.501: Status: ACTIVE | Noted: 2024-12-03

## 2024-12-03 LAB
ALBUMIN SERPL BCP-MCNC: 3.7 G/DL (ref 3.4–5)
ANION GAP SERPL CALC-SCNC: 17 MMOL/L (ref 10–20)
APTT PPP: 36 SECONDS (ref 27–38)
BASOPHILS # BLD AUTO: 0.07 X10*3/UL (ref 0–0.1)
BASOPHILS NFR BLD AUTO: 0.8 %
BUN SERPL-MCNC: 21 MG/DL (ref 6–23)
CALCIUM SERPL-MCNC: 9.7 MG/DL (ref 8.6–10.6)
CHLORIDE SERPL-SCNC: 94 MMOL/L (ref 98–107)
CO2 SERPL-SCNC: 31 MMOL/L (ref 21–32)
CREAT SERPL-MCNC: 9.34 MG/DL (ref 0.5–1.05)
CRP SERPL-MCNC: 1.13 MG/DL
EGFRCR SERPLBLD CKD-EPI 2021: 5 ML/MIN/1.73M*2
EOSINOPHIL # BLD AUTO: 1.16 X10*3/UL (ref 0–0.7)
EOSINOPHIL NFR BLD AUTO: 14 %
ERYTHROCYTE [DISTWIDTH] IN BLOOD BY AUTOMATED COUNT: 14.5 % (ref 11.5–14.5)
GLUCOSE BLD MANUAL STRIP-MCNC: 117 MG/DL (ref 74–99)
GLUCOSE BLD MANUAL STRIP-MCNC: 137 MG/DL (ref 74–99)
GLUCOSE BLD MANUAL STRIP-MCNC: 146 MG/DL (ref 74–99)
GLUCOSE BLD MANUAL STRIP-MCNC: 79 MG/DL (ref 74–99)
GLUCOSE SERPL-MCNC: 115 MG/DL (ref 74–99)
HCT VFR BLD AUTO: 29.5 % (ref 36–46)
HGB BLD-MCNC: 10.2 G/DL (ref 12–16)
IMM GRANULOCYTES # BLD AUTO: 0.02 X10*3/UL (ref 0–0.7)
IMM GRANULOCYTES NFR BLD AUTO: 0.2 % (ref 0–0.9)
INR PPP: 1.1 (ref 0.9–1.1)
LYMPHOCYTES # BLD AUTO: 1.34 X10*3/UL (ref 1.2–4.8)
LYMPHOCYTES NFR BLD AUTO: 16.2 %
MAGNESIUM SERPL-MCNC: 1.88 MG/DL (ref 1.6–2.4)
MCH RBC QN AUTO: 30.4 PG (ref 26–34)
MCHC RBC AUTO-ENTMCNC: 34.6 G/DL (ref 32–36)
MCV RBC AUTO: 88 FL (ref 80–100)
MONOCYTES # BLD AUTO: 0.94 X10*3/UL (ref 0.1–1)
MONOCYTES NFR BLD AUTO: 11.3 %
NEUTROPHILS # BLD AUTO: 4.76 X10*3/UL (ref 1.2–7.7)
NEUTROPHILS NFR BLD AUTO: 57.5 %
NRBC BLD-RTO: 0 /100 WBCS (ref 0–0)
PHOSPHATE SERPL-MCNC: 6.6 MG/DL (ref 2.5–4.9)
PLATELET # BLD AUTO: 174 X10*3/UL (ref 150–450)
POTASSIUM SERPL-SCNC: 4.8 MMOL/L (ref 3.5–5.3)
PROTHROMBIN TIME: 12.8 SECONDS (ref 9.8–12.8)
RBC # BLD AUTO: 3.36 X10*6/UL (ref 4–5.2)
SODIUM SERPL-SCNC: 137 MMOL/L (ref 136–145)
WBC # BLD AUTO: 8.3 X10*3/UL (ref 4.4–11.3)

## 2024-12-03 PROCEDURE — 2500000004 HC RX 250 GENERAL PHARMACY W/ HCPCS (ALT 636 FOR OP/ED)

## 2024-12-03 PROCEDURE — 2500000001 HC RX 250 WO HCPCS SELF ADMINISTERED DRUGS (ALT 637 FOR MEDICARE OP)

## 2024-12-03 PROCEDURE — 80069 RENAL FUNCTION PANEL: CPT

## 2024-12-03 PROCEDURE — 93922 UPR/L XTREMITY ART 2 LEVELS: CPT | Performed by: SURGERY

## 2024-12-03 PROCEDURE — 83735 ASSAY OF MAGNESIUM: CPT

## 2024-12-03 PROCEDURE — 86140 C-REACTIVE PROTEIN: CPT

## 2024-12-03 PROCEDURE — 2500000002 HC RX 250 W HCPCS SELF ADMINISTERED DRUGS (ALT 637 FOR MEDICARE OP, ALT 636 FOR OP/ED)

## 2024-12-03 PROCEDURE — 99222 1ST HOSP IP/OBS MODERATE 55: CPT

## 2024-12-03 PROCEDURE — 93922 UPR/L XTREMITY ART 2 LEVELS: CPT

## 2024-12-03 PROCEDURE — 36415 COLL VENOUS BLD VENIPUNCTURE: CPT

## 2024-12-03 PROCEDURE — 99221 1ST HOSP IP/OBS SF/LOW 40: CPT | Performed by: INTERNAL MEDICINE

## 2024-12-03 PROCEDURE — 85025 COMPLETE CBC W/AUTO DIFF WBC: CPT

## 2024-12-03 PROCEDURE — 5A1D70Z PERFORMANCE OF URINARY FILTRATION, INTERMITTENT, LESS THAN 6 HOURS PER DAY: ICD-10-PCS | Performed by: INTERNAL MEDICINE

## 2024-12-03 PROCEDURE — 1100000001 HC PRIVATE ROOM DAILY

## 2024-12-03 PROCEDURE — 99222 1ST HOSP IP/OBS MODERATE 55: CPT | Performed by: STUDENT IN AN ORGANIZED HEALTH CARE EDUCATION/TRAINING PROGRAM

## 2024-12-03 PROCEDURE — 82947 ASSAY GLUCOSE BLOOD QUANT: CPT

## 2024-12-03 PROCEDURE — 85610 PROTHROMBIN TIME: CPT

## 2024-12-03 PROCEDURE — 2500000004 HC RX 250 GENERAL PHARMACY W/ HCPCS (ALT 636 FOR OP/ED): Performed by: PHYSICIAN ASSISTANT

## 2024-12-03 RX ORDER — CETIRIZINE HYDROCHLORIDE 10 MG/1
5 TABLET ORAL
Status: DISCONTINUED | OUTPATIENT
Start: 2024-12-03 | End: 2024-12-03

## 2024-12-03 RX ORDER — POLYETHYLENE GLYCOL 3350 17 G/17G
17 POWDER, FOR SOLUTION ORAL DAILY
Status: DISCONTINUED | OUTPATIENT
Start: 2024-12-03 | End: 2024-12-06 | Stop reason: HOSPADM

## 2024-12-03 RX ORDER — DEXTROSE 50 % IN WATER (D50W) INTRAVENOUS SYRINGE
25
Status: DISCONTINUED | OUTPATIENT
Start: 2024-12-03 | End: 2024-12-06 | Stop reason: HOSPADM

## 2024-12-03 RX ORDER — VANCOMYCIN HYDROCHLORIDE 750 MG/150ML
750 INJECTION, SOLUTION INTRAVENOUS
Status: DISCONTINUED | OUTPATIENT
Start: 2024-12-04 | End: 2024-12-06

## 2024-12-03 RX ORDER — ACETAMINOPHEN 325 MG/1
975 TABLET ORAL EVERY 8 HOURS
Status: DISCONTINUED | OUTPATIENT
Start: 2024-12-03 | End: 2024-12-06 | Stop reason: HOSPADM

## 2024-12-03 RX ORDER — TRIAMCINOLONE ACETONIDE 1 MG/G
1 OINTMENT TOPICAL
COMMUNITY

## 2024-12-03 RX ORDER — DOCUSATE SODIUM 100 MG/1
100 CAPSULE, LIQUID FILLED ORAL 2 TIMES DAILY
Status: DISCONTINUED | OUTPATIENT
Start: 2024-12-03 | End: 2024-12-03

## 2024-12-03 RX ORDER — VANCOMYCIN HYDROCHLORIDE 1 G/20ML
INJECTION, POWDER, LYOPHILIZED, FOR SOLUTION INTRAVENOUS DAILY PRN
Status: DISCONTINUED | OUTPATIENT
Start: 2024-12-03 | End: 2024-12-06 | Stop reason: HOSPADM

## 2024-12-03 RX ORDER — POLYETHYLENE GLYCOL 3350 17 G/17G
17 POWDER, FOR SOLUTION ORAL DAILY PRN
COMMUNITY

## 2024-12-03 RX ORDER — KETOCONAZOLE 20 MG/ML
1 SHAMPOO, SUSPENSION TOPICAL
COMMUNITY

## 2024-12-03 RX ORDER — MAGNESIUM SULFATE 1 G/100ML
1 INJECTION INTRAVENOUS ONCE
Status: COMPLETED | OUTPATIENT
Start: 2024-12-03 | End: 2024-12-03

## 2024-12-03 RX ORDER — ASPIRIN 81 MG/1
81 TABLET ORAL
Status: DISCONTINUED | OUTPATIENT
Start: 2024-12-03 | End: 2024-12-06 | Stop reason: HOSPADM

## 2024-12-03 RX ORDER — MIDODRINE HYDROCHLORIDE 5 MG/1
5 TABLET ORAL
Status: DISCONTINUED | OUTPATIENT
Start: 2024-12-03 | End: 2024-12-04

## 2024-12-03 RX ORDER — OXYCODONE HYDROCHLORIDE 5 MG/1
5 TABLET ORAL EVERY 4 HOURS PRN
Status: DISCONTINUED | OUTPATIENT
Start: 2024-12-03 | End: 2024-12-04

## 2024-12-03 RX ORDER — ONDANSETRON HYDROCHLORIDE 2 MG/ML
4 INJECTION, SOLUTION INTRAVENOUS EVERY 8 HOURS PRN
Status: DISCONTINUED | OUTPATIENT
Start: 2024-12-03 | End: 2024-12-06 | Stop reason: HOSPADM

## 2024-12-03 RX ORDER — SEVELAMER CARBONATE 800 MG/1
2400 TABLET, FILM COATED ORAL
Status: DISCONTINUED | OUTPATIENT
Start: 2024-12-03 | End: 2024-12-06 | Stop reason: HOSPADM

## 2024-12-03 RX ORDER — PETROLATUM 420 MG/G
OINTMENT TOPICAL
Status: DISCONTINUED | OUTPATIENT
Start: 2024-12-03 | End: 2024-12-06 | Stop reason: HOSPADM

## 2024-12-03 RX ORDER — PREGABALIN 50 MG/1
100 CAPSULE ORAL NIGHTLY
Status: DISCONTINUED | OUTPATIENT
Start: 2024-12-03 | End: 2024-12-06 | Stop reason: HOSPADM

## 2024-12-03 RX ORDER — PANTOPRAZOLE SODIUM 40 MG/1
40 TABLET, DELAYED RELEASE ORAL
Status: DISCONTINUED | OUTPATIENT
Start: 2024-12-03 | End: 2024-12-06 | Stop reason: HOSPADM

## 2024-12-03 RX ORDER — ATORVASTATIN CALCIUM 40 MG/1
40 TABLET, FILM COATED ORAL NIGHTLY
Status: DISCONTINUED | OUTPATIENT
Start: 2024-12-03 | End: 2024-12-06 | Stop reason: HOSPADM

## 2024-12-03 RX ORDER — ONDANSETRON HYDROCHLORIDE 2 MG/ML
INJECTION, SOLUTION INTRAVENOUS
Status: COMPLETED
Start: 2024-12-03 | End: 2024-12-03

## 2024-12-03 RX ORDER — CLOBETASOL PROPIONATE 0.5 MG/G
1 OINTMENT TOPICAL 2 TIMES DAILY PRN
COMMUNITY

## 2024-12-03 RX ORDER — INSULIN LISPRO 100 [IU]/ML
0-10 INJECTION, SOLUTION INTRAVENOUS; SUBCUTANEOUS
Status: DISCONTINUED | OUTPATIENT
Start: 2024-12-03 | End: 2024-12-06 | Stop reason: HOSPADM

## 2024-12-03 RX ORDER — TIMOLOL MALEATE 5 MG/ML
1 SOLUTION/ DROPS OPHTHALMIC 2 TIMES DAILY
Status: DISCONTINUED | OUTPATIENT
Start: 2024-12-03 | End: 2024-12-06 | Stop reason: HOSPADM

## 2024-12-03 RX ORDER — DEXTROSE 50 % IN WATER (D50W) INTRAVENOUS SYRINGE
12.5
Status: DISCONTINUED | OUTPATIENT
Start: 2024-12-03 | End: 2024-12-06 | Stop reason: HOSPADM

## 2024-12-03 SDOH — ECONOMIC STABILITY: HOUSING INSECURITY: AT ANY TIME IN THE PAST 12 MONTHS, WERE YOU HOMELESS OR LIVING IN A SHELTER (INCLUDING NOW)?: NO

## 2024-12-03 SDOH — HEALTH STABILITY: MENTAL HEALTH: HOW OFTEN DO YOU HAVE A DRINK CONTAINING ALCOHOL?: NEVER

## 2024-12-03 SDOH — ECONOMIC STABILITY: FOOD INSECURITY: WITHIN THE PAST 12 MONTHS, THE FOOD YOU BOUGHT JUST DIDN'T LAST AND YOU DIDN'T HAVE MONEY TO GET MORE.: NEVER TRUE

## 2024-12-03 SDOH — HEALTH STABILITY: MENTAL HEALTH: HOW MANY DRINKS CONTAINING ALCOHOL DO YOU HAVE ON A TYPICAL DAY WHEN YOU ARE DRINKING?: PATIENT DOES NOT DRINK

## 2024-12-03 SDOH — ECONOMIC STABILITY: FOOD INSECURITY: WITHIN THE PAST 12 MONTHS, YOU WORRIED THAT YOUR FOOD WOULD RUN OUT BEFORE YOU GOT THE MONEY TO BUY MORE.: NEVER TRUE

## 2024-12-03 SDOH — HEALTH STABILITY: MENTAL HEALTH: HOW OFTEN DO YOU HAVE SIX OR MORE DRINKS ON ONE OCCASION?: NEVER

## 2024-12-03 SDOH — SOCIAL STABILITY: SOCIAL INSECURITY: WITHIN THE LAST YEAR, HAVE YOU BEEN AFRAID OF YOUR PARTNER OR EX-PARTNER?: NO

## 2024-12-03 SDOH — SOCIAL STABILITY: SOCIAL INSECURITY: WITHIN THE LAST YEAR, HAVE YOU BEEN HUMILIATED OR EMOTIONALLY ABUSED IN OTHER WAYS BY YOUR PARTNER OR EX-PARTNER?: NO

## 2024-12-03 SDOH — SOCIAL STABILITY: SOCIAL INSECURITY: WERE YOU ABLE TO COMPLETE ALL THE BEHAVIORAL HEALTH SCREENINGS?: YES

## 2024-12-03 SDOH — SOCIAL STABILITY: SOCIAL INSECURITY: DOES ANYONE TRY TO KEEP YOU FROM HAVING/CONTACTING OTHER FRIENDS OR DOING THINGS OUTSIDE YOUR HOME?: NO

## 2024-12-03 SDOH — SOCIAL STABILITY: SOCIAL INSECURITY: HAS ANYONE EVER THREATENED TO HURT YOUR FAMILY OR YOUR PETS?: NO

## 2024-12-03 SDOH — SOCIAL STABILITY: SOCIAL INSECURITY: DO YOU FEEL UNSAFE GOING BACK TO THE PLACE WHERE YOU ARE LIVING?: NO

## 2024-12-03 SDOH — SOCIAL STABILITY: SOCIAL INSECURITY: HAVE YOU HAD ANY THOUGHTS OF HARMING ANYONE ELSE?: NO

## 2024-12-03 SDOH — SOCIAL STABILITY: SOCIAL INSECURITY: ARE THERE ANY APPARENT SIGNS OF INJURIES/BEHAVIORS THAT COULD BE RELATED TO ABUSE/NEGLECT?: NO

## 2024-12-03 SDOH — ECONOMIC STABILITY: INCOME INSECURITY: IN THE PAST 12 MONTHS HAS THE ELECTRIC, GAS, OIL, OR WATER COMPANY THREATENED TO SHUT OFF SERVICES IN YOUR HOME?: NO

## 2024-12-03 SDOH — ECONOMIC STABILITY: FOOD INSECURITY: HOW HARD IS IT FOR YOU TO PAY FOR THE VERY BASICS LIKE FOOD, HOUSING, MEDICAL CARE, AND HEATING?: NOT VERY HARD

## 2024-12-03 SDOH — HEALTH STABILITY: PHYSICAL HEALTH: ON AVERAGE, HOW MANY DAYS PER WEEK DO YOU ENGAGE IN MODERATE TO STRENUOUS EXERCISE (LIKE A BRISK WALK)?: 0 DAYS

## 2024-12-03 SDOH — ECONOMIC STABILITY: HOUSING INSECURITY: IN THE LAST 12 MONTHS, WAS THERE A TIME WHEN YOU WERE NOT ABLE TO PAY THE MORTGAGE OR RENT ON TIME?: NO

## 2024-12-03 SDOH — SOCIAL STABILITY: SOCIAL INSECURITY: ABUSE: ADULT

## 2024-12-03 SDOH — HEALTH STABILITY: PHYSICAL HEALTH: ON AVERAGE, HOW MANY MINUTES DO YOU ENGAGE IN EXERCISE AT THIS LEVEL?: 0 MIN

## 2024-12-03 SDOH — SOCIAL STABILITY: SOCIAL INSECURITY: DO YOU FEEL ANYONE HAS EXPLOITED OR TAKEN ADVANTAGE OF YOU FINANCIALLY OR OF YOUR PERSONAL PROPERTY?: NO

## 2024-12-03 SDOH — SOCIAL STABILITY: SOCIAL INSECURITY: ARE YOU OR HAVE YOU BEEN THREATENED OR ABUSED PHYSICALLY, EMOTIONALLY, OR SEXUALLY BY ANYONE?: NO

## 2024-12-03 SDOH — SOCIAL STABILITY: SOCIAL INSECURITY: HAVE YOU HAD THOUGHTS OF HARMING ANYONE ELSE?: NO

## 2024-12-03 SDOH — ECONOMIC STABILITY: TRANSPORTATION INSECURITY: IN THE PAST 12 MONTHS, HAS LACK OF TRANSPORTATION KEPT YOU FROM MEDICAL APPOINTMENTS OR FROM GETTING MEDICATIONS?: NO

## 2024-12-03 ASSESSMENT — PAIN SCALES - WONG BAKER: WONGBAKER_NUMERICALRESPONSE: HURTS LITTLE MORE

## 2024-12-03 ASSESSMENT — LIFESTYLE VARIABLES
AUDIT-C TOTAL SCORE: 0
HOW MANY STANDARD DRINKS CONTAINING ALCOHOL DO YOU HAVE ON A TYPICAL DAY: PATIENT DOES NOT DRINK
HOW OFTEN DO YOU HAVE A DRINK CONTAINING ALCOHOL: NEVER
AUDIT-C TOTAL SCORE: 0
SKIP TO QUESTIONS 9-10: 1
AUDIT-C TOTAL SCORE: 0
HOW OFTEN DO YOU HAVE 6 OR MORE DRINKS ON ONE OCCASION: NEVER
SKIP TO QUESTIONS 9-10: 1
PRESCIPTION_ABUSE_PAST_12_MONTHS: NO
SUBSTANCE_ABUSE_PAST_12_MONTHS: NO

## 2024-12-03 ASSESSMENT — COGNITIVE AND FUNCTIONAL STATUS - GENERAL
MOBILITY SCORE: 24
DAILY ACTIVITIY SCORE: 24
PATIENT BASELINE BEDBOUND: NO

## 2024-12-03 ASSESSMENT — ACTIVITIES OF DAILY LIVING (ADL)
ADEQUATE_TO_COMPLETE_ADL: YES
JUDGMENT_ADEQUATE_SAFELY_COMPLETE_DAILY_ACTIVITIES: YES
GROOMING: INDEPENDENT
DRESSING YOURSELF: INDEPENDENT
LACK_OF_TRANSPORTATION: NO
HEARING - LEFT EAR: FUNCTIONAL
FEEDING YOURSELF: INDEPENDENT
HEARING - RIGHT EAR: FUNCTIONAL
BATHING: INDEPENDENT
LACK_OF_TRANSPORTATION: NO
PATIENT'S MEMORY ADEQUATE TO SAFELY COMPLETE DAILY ACTIVITIES?: YES
TOILETING: INDEPENDENT
WALKS IN HOME: INDEPENDENT

## 2024-12-03 ASSESSMENT — PAIN - FUNCTIONAL ASSESSMENT
PAIN_FUNCTIONAL_ASSESSMENT: 0-10
PAIN_FUNCTIONAL_ASSESSMENT: 0-10

## 2024-12-03 ASSESSMENT — PAIN SCALES - GENERAL
PAINLEVEL_OUTOF10: 0 - NO PAIN
PAINLEVEL_OUTOF10: 10 - WORST POSSIBLE PAIN
PAINLEVEL_OUTOF10: 0 - NO PAIN
PAINLEVEL_OUTOF10: 0 - NO PAIN
PAINLEVEL_OUTOF10: 8

## 2024-12-03 ASSESSMENT — PATIENT HEALTH QUESTIONNAIRE - PHQ9
SUM OF ALL RESPONSES TO PHQ9 QUESTIONS 1 & 2: 0
2. FEELING DOWN, DEPRESSED OR HOPELESS: NOT AT ALL
1. LITTLE INTEREST OR PLEASURE IN DOING THINGS: NOT AT ALL

## 2024-12-03 ASSESSMENT — PAIN DESCRIPTION - LOCATION: LOCATION: FOOT

## 2024-12-03 ASSESSMENT — ENCOUNTER SYMPTOMS
NEUROLOGICAL NEGATIVE: 1
COLOR CHANGE: 1
GASTROINTESTINAL NEGATIVE: 1
WOUND: 1
MUSCULOSKELETAL NEGATIVE: 1
CONSTITUTIONAL NEGATIVE: 1

## 2024-12-03 ASSESSMENT — COLUMBIA-SUICIDE SEVERITY RATING SCALE - C-SSRS
2. HAVE YOU ACTUALLY HAD ANY THOUGHTS OF KILLING YOURSELF?: NO
6. HAVE YOU EVER DONE ANYTHING, STARTED TO DO ANYTHING, OR PREPARED TO DO ANYTHING TO END YOUR LIFE?: NO
1. IN THE PAST MONTH, HAVE YOU WISHED YOU WERE DEAD OR WISHED YOU COULD GO TO SLEEP AND NOT WAKE UP?: NO

## 2024-12-03 NOTE — CONSULTS
VASCULAR SURGERY CONSULT NOTE    Assessment/Plan   Rody Espinosa is 53 y.o. female with history of ESRD (failed transplant in 2021, multiple previous access sites on LUE and currently RLE), HTN, HLD, CAD (MI in 2006, PCI with DESx2), DM, HOLLY who presents as a transfer from [facility] for R 2nd toe gangrene.     Plan:  Duplex ultrasound of the AVF to assess flow  Agree with podiatry consult  Continue antibiotics  Will continue to follow    D/w attending, Dr. Tracie Bland MD  General Surgery PGY-3  Vascular Surgery [id number]    Subjective   HPI:  Rody Espinosa is 53 y.o. female with history of ESRD (failed transplant in 2021, multiple previous access sites on LUE and currently RLE), HTN, HLD, CAD (MI in 2006, PCI with DESx2), DM, HOLLY who presents as a transfer from [facility] for R 2nd toe gangrene and concern for OM    She came to the ED at the OSH after noticing foul-smelling drainage from her R 2nd toe since about Thursday. She noticed that toe and several others have been looking a bit dark and swollen (2nd, 3rd,5th) for a couple of months. She's been having increased pain in these toes. She says she has otherwise been able to walk up a couple of flights of stairs before having any pain, although she feels it more in her right extremity than left.     She denies any history of intervention in her lower extremities before other than the R AVF (used vein harvest from RLE).     Vascular History:  SVC syndrome with chronic RUE swelling, R subclavian vein stent  Multiple failed LUE HD access  Right groin AVF    PMH: ESRD (failed transplant in 2021, multiple previous access sites on LUE and currently RLE), HTN, HLD, CAD (MI in 2006, PCI with DESx2), DM, HOLLY     PSH: multiple access surgeries, R subclavian stent, eye surgeries    Home Meds:  Current Facility-Administered Medications on File Prior to Encounter   Medication Dose Route Frequency Provider Last Rate Last Admin    [COMPLETED] vancomycin  (Vancocin) 750 mg in dextrose 5%  mL  750 mg intravenous Once Ely Waddell PA-C   Stopped at 12/02/24 1616    [DISCONTINUED] acetaminophen (Tylenol) tablet 650 mg  650 mg oral q4h PRN NYLA WallaceC   650 mg at 12/01/24 2356    [DISCONTINUED] aspirin EC tablet 81 mg  81 mg oral Daily NYLA WallaceC   81 mg at 12/01/24 0918    [DISCONTINUED] atorvastatin (Lipitor) tablet 40 mg  40 mg oral Nightly SARTHAK Wallace-C   40 mg at 12/02/24 2124    [DISCONTINUED] dextrose 50 % injection 12.5 g  12.5 g intravenous q15 min PRN NYLA WallaceC   12.5 g at 11/30/24 2222    [DISCONTINUED] dextrose 50 % injection 25 g  25 g intravenous q15 min PRN NYLA WallaceC   25 g at 12/01/24 0450    [DISCONTINUED] diphenhydrAMINE (BENADryl) capsule 25 mg  25 mg oral q6h PRN Solomon Deleon, DO   25 mg at 12/02/24 1430    [DISCONTINUED] docusate sodium (Colace) capsule 100 mg  100 mg oral BID Ely Waddell PA-C   100 mg at 12/01/24 0918    [DISCONTINUED] glipiZIDE (Glucotrol) tablet 10 mg  10 mg oral BID NYLA WallaceC   10 mg at 11/30/24 2036    [DISCONTINUED] glipiZIDE (Glucotrol) tablet 5 mg  5 mg oral BID Solomon Deleon, DO        [DISCONTINUED] glucagon (Glucagen) injection 1 mg  1 mg intramuscular q15 min PRN NYLA WallaceC        [DISCONTINUED] glucagon (Glucagen) injection 1 mg  1 mg intramuscular q15 min PRN NYLA WallaceC        [DISCONTINUED] heparin (porcine) injection 5,000 Units  5,000 Units subcutaneous q8h Solomon Deleon, DO   5,000 Units at 12/02/24 2123    [DISCONTINUED] HYDROmorphone (Dilaudid) injection 0.5 mg  0.5 mg intravenous q4h PRN NYLA WallaceC   0.5 mg at 12/03/24 0203    [DISCONTINUED] HYDROmorphone PF (Dilaudid) injection 0.2 mg  0.2 mg intravenous q4h PRN Ely Waddell PA-C   0.2 mg at 11/30/24 0559    [DISCONTINUED] insulin lispro injection 0-5 Units  0-5 Units subcutaneous TID AC Ely Waddell PA-C   1 Units at 12/01/24 1214     [DISCONTINUED] melatonin tablet 3 mg  3 mg oral Nightly PRN Ely Waddell PA-C        [DISCONTINUED] metoprolol succinate XL (Toprol-XL) 24 hr tablet 25 mg  25 mg oral Daily Ely Waddell PA-C   25 mg at 12/01/24 0919    [DISCONTINUED] midodrine (Proamatine) tablet 5 mg  5 mg oral q8h Abdullahi G Juwane, DO   5 mg at 12/02/24 2124    [DISCONTINUED] ondansetron (Zofran) injection 4 mg  4 mg intravenous q8h PRN Ely Waddell PA-C        [DISCONTINUED] ondansetron ODT (Zofran-ODT) disintegrating tablet 4 mg  4 mg oral q8h PRN Ely Waddell PA-C        [DISCONTINUED] pantoprazole (ProtoNix) EC tablet 40 mg  40 mg oral BID AC Ely Waddell PA-C   40 mg at 12/02/24 1745    [DISCONTINUED] pantoprazole (ProtoNix) injection 40 mg  40 mg intravenous BID AC Ely Waddell PA-C        [DISCONTINUED] piperacillin-tazobactam (Zosyn) 2.25 g in dextrose (iso) IV 50 mL  2.25 g intravenous q8h Ely Waddell PA-C 0 mL/hr at 12/02/24 1349 2.25 g at 12/02/24 2124    [DISCONTINUED] polyethylene glycol (Glycolax, Miralax) packet 17 g  17 g oral Daily Ely Waddell PA-C   17 g at 12/01/24 0919    [DISCONTINUED] pregabalin (Lyrica) capsule 100 mg  100 mg oral Nightly Ely Waddell PA-C   100 mg at 12/02/24 2124    [DISCONTINUED] sevelamer carbonate (Renvela) tablet 1,600 mg  1,600 mg oral TID Ely Waddell PA-C   1,600 mg at 12/02/24 1745    [DISCONTINUED] timolol (Timoptic) 0.5 % ophthalmic solution 1 drop  1 drop ophthalmic (eye) Daily Ely Waddell PA-C   1 drop at 12/01/24 0918    [DISCONTINUED] vancomycin (Vancocin) pharmacy to dose - pharmacy monitoring   miscellaneous Daily PRN SARTHAK Wallace-C         Current Outpatient Medications on File Prior to Encounter   Medication Sig Dispense Refill    acetaminophen (Tylenol) 325 mg tablet Take 2 tablets (650 mg) by mouth every 4 hours if needed.      aspirin 81 mg EC tablet Take 1 tablet (81 mg) by mouth once daily.      atorvastatin (Lipitor) 40 mg tablet  TAKE ONE TABLET BY MOUTH AT BEDTIME 90 tablet 3    cetirizine (ZyrTEC) 5 mg tablet Take 1 tablet (5 mg) by mouth once daily.      docusate sodium (Colace) 100 mg capsule Take 1 capsule (100 mg) by mouth twice a day.      glipiZIDE (Glucotrol) 5 mg tablet Take 1 tablet (5 mg) by mouth 2 times a day.      metoprolol succinate XL (Toprol-XL) 25 mg 24 hr tablet Take 1 tablet (25 mg) by mouth once daily. 30 tablet 3    multivitamin tablet Take 1 tablet by mouth once daily.      Nephron FA 66 mg iron- 1,000 mcg tablet Take 1 tablet by mouth once daily.      omeprazole (PriLOSEC) 40 mg DR capsule Take 1 capsule (40 mg) by mouth 2 times a day.      ondansetron (Zofran) 4 mg tablet Take by mouth every 8 hours if needed for nausea or vomiting.      pregabalin (Lyrica) 25 mg capsule Take 4 capsules (100 mg) by mouth once daily at bedtime.      sevelamer carbonate (Renvela) 800 mg tablet Take 3 tablets (2,400 mg) by mouth 3 times daily (morning, midday, late afternoon).      timolol (Timoptic) 0.5 % ophthalmic solution Administer 1 drop into affected eye(s) twice a day.      [DISCONTINUED] calcitriol (Rocaltrol) 0.5 mcg capsule Take by mouth.      [DISCONTINUED] clobetasol (Temovate) 0.05 % ointment Apply to itchy spot on back twice daily five days a week      [DISCONTINUED] diphenhydrAMINE (Benadryl Allergy) 25 mg tablet Take by mouth as needed at bedtime.      [DISCONTINUED] ketoconazole (NIZOral) 2 % shampoo WASH SCALP ONCE EVERY OTHER WEEK  LEAVE ON FOR 15 MINUTES THEN RINSE      [DISCONTINUED] sodium bicarbonate 325 mg tablet 3 tablet po daily          Allergies:  Allergies   Allergen Reactions    Betadine [Povidone-Iodine] Unknown    Iodinated Contrast Media Itching    Adhesive Tape-Silicones Rash    Iodine Rash     Mild rash       SH/FH: no smoking, EtOH use, illicit drug use. No family history of bleeding or clotting disorders.     ROS: 12 system negative except HPI    Objective   Vitals:  Heart Rate:  [78-89]   Temp:   "[36.1 °C (97 °F)-36.6 °C (97.9 °F)]   Resp:  [16-18]   BP: ()/(44-97)   Height:  [170.2 cm (5' 7.01\")]   Weight:  [84.5 kg (186 lb 4.6 oz)]   SpO2:  [94 %-100 %]     Exam:  Constitutional: No acute distress, resting comfortably  Neuro:  AOx3, grossly intact  ENMT: moist mucous membranes  Head/neck: atraumatic  CV: no tachycardia  Pulm: non-labored on room air  GI: soft, non-tender, non-distended  Skin: warm and dry  Musculoskeletal: moving all extremities  Extremities: L femoral, DP palpable. R fistula with palpable thrill, popliteal monophasic, DP/PT monophasic    Labs:  Results from last 7 days   Lab Units 12/03/24  0629 12/02/24  0545 11/30/24  0611   WBC AUTO x10*3/uL 8.3 9.2 9.7   HEMOGLOBIN g/dL 10.2* 10.3* 10.2*   PLATELETS AUTO x10*3/uL 174 177 157      Results from last 7 days   Lab Units 12/03/24  0629 12/02/24  0545 12/01/24  1509   SODIUM mmol/L 137 140 138   POTASSIUM mmol/L 4.8 5.5* 4.8   CHLORIDE mmol/L 94* 98 97*   CO2 mmol/L 31 27 28   BUN mg/dL 21 49* 42*   CREATININE mg/dL 9.34* 14.67* 13.18*   GLUCOSE mg/dL 115* 76 114*   MAGNESIUM mg/dL 1.88  --   --    PHOSPHORUS mg/dL 6.6*  --  5.1*      Results from last 7 days   Lab Units 12/03/24  0629   INR  1.1   PROTIME seconds 12.8   APTT seconds 36               "

## 2024-12-03 NOTE — CARE PLAN
The patient's goals for the shift include      The clinical goals for the shift include pt will remain HDS.    Over the shift, the patient did not make progress toward the following goals. Barriers to progression include =. Recommendations to address these barriers include .

## 2024-12-03 NOTE — HOSPITAL COURSE
Rody Espinosa is a 53 y.o. female w/ PMH of ESRD s/p failed renal transplant in 2001, on IHD, T2DM with polyneuropathy, PAD, HLD, CAD who presented to [facility] on 11/29 for severe pain of her right second toe with foul smelling drainage. At [facility] she was started on vancomycin and zosyn. There were plans to do vascular studies to assess blood flow but due to the presence of a R groin graft, she was sent to [facility] for vascular surgery evaluation.     At [facility] she underwent US of her R femoral AVG which was found to be widely patent. DHARMESH showed moderate occlusive disease in RLE and none in LLE although she did have decreased digital perfusion in both LE. MRI R foot showed OM in 5th digit and reactive osteitis vs early osteomyelitis in the 2nd digit.     Podiatry was consulted and following the patient with surgical plan pending vascular surgery plan. She was planned for angiogram with vascular surgery on 12/6, however patient requested transfer to [facility] to see her previous vascular surgeon and declined further workup and intervention by vascular surgery or podiatry at [facility]. Patient was accepted as a transfer to [facility] pending bed availability. Patient opted to present to [facility] directly via transport by family member. Our recommendation was to stay and complete IV antibiotics and undergo vascular and podiatry workup but she declined so was discharged with a 2 week prescription for PO doxy and cipro to bridge her with antibiotic coverage in case there is a delay in her receiving further care. She stated that she would present immediately to [facility] ED for evaluation.

## 2024-12-03 NOTE — PROGRESS NOTES
Rody Espinosa is a 53 y.o. female w/ PMH of ESRD s/p failedrenal transplant in 2001, on IHD, T2DM with polyneuropathy, PAD, HLD, CAD who presented to [facility] on 11/29 for severe pain of her right second toe with foul smelling drainage. She was transferred from [facility] on 12/2 overnight for vascular surgery evaluation.      Subjective   Patient seen at bedside this morning. She reports she received a dose of dilaudid at [facility] before transferring here and had an empty stomach so had some n/v which has now resolved. Her pain is currently under control. She denies fevers/chills, chest pain, shortness of breath, abdominal pain, constipation. She has had diarrhea recently but reports she was getting miralax and colace.       Objective     Last Recorded Vitals  BP (!) 94/44   Pulse 79   Temp 36.3 °C (97.3 °F)   Resp 16   SpO2 94%   Intake/Output last 3 Shifts:  No intake or output data in the 24 hours ending 12/03/24 0740    Admission Weight       Daily Weight  12/02/24 : 84.5 kg (186 lb 4.6 oz)    Image Results  XR foot right 3+ views  Narrative: STUDY:  Foot Radiographs; 11/29/2024, 6:34 PM  INDICATION:  2nd toe has a ulcer.  COMPARISON:  None Available.  ACCESSION NUMBER(S):  [id number]  ORDERING CLINICIAN:  MEHNAZ FRANCISCO  TECHNIQUE:  three view(s) of the right foot.  FINDINGS:    There is soft tissue ulceration at the distal second toe, with minimal  soft tissue irregularity. There is well-defined osteolysis involving  the mid portion of the adjacent distal phalanx.  Elsewhere, there is minimal generalized swelling in the foot, with  minimal vascular calcifications. There is minimal hallux valgus.  Impression: Soft tissue ulceration distal second toe, with well-defined osteolysis  involving the mid portion of the adjacent distal phalanx  Signed by Flaco Tim MD      Physical Exam  Constitutional:       General: She is not in acute distress.     Appearance: Normal appearance.   HENT:      Head: Normocephalic  and atraumatic.      Mouth/Throat:      Mouth: Mucous membranes are moist.   Eyes:      Extraocular Movements: Extraocular movements intact.      Pupils: Pupils are equal, round, and reactive to light.      Comments: Patient is blind in L eye   Cardiovascular:      Rate and Rhythm: Normal rate and regular rhythm.      Heart sounds: Normal heart sounds.   Pulmonary:      Effort: Pulmonary effort is normal.      Breath sounds: Normal breath sounds.   Abdominal:      General: Abdomen is flat.      Palpations: Abdomen is soft.   Musculoskeletal:      Comments: Patient has non-pitting edema in her RUE, at her baseline per patient    R second toe necrotic wound, currently wrapped. See media tab for images.   Skin:     General: Skin is warm and dry.   Neurological:      General: No focal deficit present.      Mental Status: She is alert and oriented to person, place, and time.         Relevant Results               Assessment/Plan      Assessment & Plan  Osteomyelitis    Rody Espinosa is a 53 y.o. female w/ PMH of ESRD s/p failedrenal transplant in 2001, on IHD, T2DM with polyneuropathy, PAD, HLD, CAD who is transferred from [facility] for vascular surgery evaluation iso of right foot osteomyelitis    Updates 12/3:  -MRI R foot ordered  -Podiatry consulted  -Vascular surgery consulted  -Nephro consulted for ESRD/dialysis    #Osteomyelitis of right foot  #Diabetic ulcer of right second toe  -Patient notes pain in foot has been worsening over months  -About a week ago, her right second toe started draining foul smelling fluid  -ESR and CRP elevated  -X-ray of foot showed soft tissue ulceration to the distal second toe with well-defined osteolysis involving the midportion of the adjacent distal phalanx  -Evaluated by podiatry at [facility]; recommended vascular surgery at [facility]  -Started on vanc and zosyn; ID consulted at [facility] and agreed to continue  -Blood culture no growth at 3 days  PLAN:  -Podiatry consult  -Vascular surgery  consult  -MRI of right foot  -Continue vancomycin and zosyn     #ESRD s/p failed transplant  -On dialysis MWF  -right thigh AVG   -Sevelamer 2400 TID at home  PLAN:  -Nephro consulted for dialysis  -Continue home sevelamer 2400 TID     #T2DM c/b polyneuropathy  -Hold home glipizide  -SSI  -Hypoglycemia protocol  -Continue pregabalin 100 nightly     #HTN  #Hypotension  -On metoprolol succinate 25 mg at home; Patient notes mainly for HR  -Has been held at [facility] iso of normotensive blood pressure  -Started on midodrine 5 mg TID at [facility] for hypotension  PLAN:  -Hold home metoprolol succinate 25 mg  -Continue midodrine 5 mg TID     #CAD  #PAD  #HLD  -Continue ASA 81  -Continue Atorvastatin 40 mg     F: PRN  E: PRN  N: Renal, consistent card diet  A: PIV, rt thigh AVG     DVT ppx: Hold in case of vascular procedure  GI ppx: PPI     Code status:: Full code  Contact: Montez Rosario () ([phone])              Sin Kovacs MD  PGY-1

## 2024-12-03 NOTE — PROGRESS NOTES
Pharmacy Medication History Review    Royd Espinosa is a 53 y.o. female admitted for Osteomyelitis. Pharmacy reviewed the patient's [id number] medications and allergies for accuracy.    Medications ADDED:  Clobetasol, triamcinolone, miralax  Medications CHANGED:  none  Medications REMOVED:   docusate     The list below reflects the updated PTA list.   Prior to Admission Medications   Prescriptions Last Dose Informant   Nephron FA 66 mg iron- 1,000 mcg tablet  Self   Sig: Take 1 tablet by mouth once daily.   acetaminophen (Tylenol) 325 mg tablet  Self   Sig: Take 2 tablets (650 mg) by mouth every 4 hours if needed.   aspirin 81 mg EC tablet  Self   Sig: Take 1 tablet (81 mg) by mouth once daily.   atorvastatin (Lipitor) 40 mg tablet  Self   Sig: TAKE ONE TABLET BY MOUTH AT BEDTIME   cetirizine (ZyrTEC) 5 mg tablet  Self   Sig: Take 1 tablet (5 mg) by mouth once daily at bedtime.   clobetasol (Temovate) 0.05 % ointment  Self   Sig: Apply 1 Application topically 2 times a day as needed.   glipiZIDE (Glucotrol) 5 mg tablet  Self   Sig: Take 1 tablet (5 mg) by mouth 2 times a day.   ketoconazole (NIZOral) 2 % shampoo  Self   Sig: Apply 1 Application topically every 14 (fourteen) days.   metoprolol succinate XL (Toprol-XL) 25 mg 24 hr tablet  Self   Sig: Take 1 tablet (25 mg) by mouth once daily.   omeprazole (PriLOSEC) 40 mg DR capsule  Self   Sig: Take 1 capsule (40 mg) by mouth 2 times a day.   ondansetron (Zofran) 4 mg tablet  Self   Sig: Take by mouth every 8 hours if needed for nausea or vomiting.   polyethylene glycol (Glycolax, Miralax) 17 gram packet  Self   Sig: Take 17 g by mouth once daily as needed.   pregabalin (Lyrica) 25 mg capsule  Self   Sig: Take 1-4 capsules ( mg) by mouth as needed at bedtime.   sevelamer carbonate (Renvela) 800 mg tablet  Self   Sig: Take 3 tablets (2,400 mg) by mouth 3 times daily (morning, midday, late afternoon).   timolol (Timoptic) 0.5 % ophthalmic solution   "Self   Sig: Administer 1 drop into the left eye twice a day.   triamcinolone (Kenalog) 0.1 % ointment  Self   Sig: Apply 1 Application topically every other day if needed. To scalp      Facility-Administered Medications: None        The list below reflects the updated allergy list. Please review each documented allergy for additional clarification and justification.  Allergies  Reviewed by Melva Rossi RN on 12/3/2024        Severity Reactions Comments    Betadine [povidone-iodine] Not Specified Unknown     Iodinated Contrast Media Not Specified Itching     Adhesive Tape-silicones Low Rash     Iodine Low Rash Mild rash            Patient declines M2B at discharge.     Sources:   [facility]  Pharmacy dispense history  Patient interview Good historian  Chart Review  Care Everywhere  Cardiology office visit note 9/13/24    Additional Comments:  none      Nino Vang, PharmD  Transitions of Care Pharmacist  12/03/24     Secure Chat preferred   If no response call [phone] or "[employer]" \"Med Rec\"    "

## 2024-12-03 NOTE — CARE PLAN
The patient's goals for the shift include  getting the MRI done    The clinical goals for the shift include Pt will remain free from falls and injury throughout shift    Throughout the shift Pt was calm and cooperative with care. Pt accepting of nursing interventions. Bed locked and low, call light within reach, Pt belongings at bedside.    Problem: Pain - Adult  Goal: Verbalizes/displays adequate comfort level or baseline comfort level  Outcome: Progressing     Problem: Safety - Adult  Goal: Free from fall injury  Outcome: Progressing     Problem: Discharge Planning  Goal: Discharge to home or other facility with appropriate resources  Outcome: Progressing     Problem: Chronic Conditions and Co-morbidities  Goal: Patient's chronic conditions and co-morbidity symptoms are monitored and maintained or improved  Outcome: Progressing     Problem: Skin  Goal: Decreased wound size/increased tissue granulation at next dressing change  Outcome: Progressing  Goal: Participates in plan/prevention/treatment measures  Outcome: Progressing  Goal: Prevent/manage excess moisture  Outcome: Progressing  Goal: Prevent/minimize sheer/friction injuries  Outcome: Progressing  Goal: Promote/optimize nutrition  Outcome: Progressing  Goal: Promote skin healing  Outcome: Progressing

## 2024-12-03 NOTE — CONSULTS
Vancomycin Dosing by Pharmacy- INITIAL    Rody Espinosa is a 53 y.o. year old female who Pharmacy has been consulted for vancomycin dosing for osteomyelitis/septic arthritis. Based on the patient's indication and renal status this patient will be dosed based on a goal pre-HD level of 20-25.     Patient is on HD MWF schedule.    Visit Vitals  BP 91/57   Pulse 78   Temp 36.2 °C (97.2 °F)   Resp 16        Lab Results   Component Value Date    CREATININE 14.67 (H) 2024    CREATININE 13.18 (H) 2024    CREATININE 11.70 (H) 2024    CREATININE 9.77 (H) 2024        Patient weight is as follows: There were no vitals filed for this visit.    Cultures:  No results found for the encounter in last 14 days.        No intake/output data recorded.  I/O during current shift:  No intake/output data recorded.    Temp (24hrs), Av.6 °C (97.8 °F), Min:36.1 °C (97 °F), Max:37.1 °C (98.8 °F)    Assessment/Plan     Patient will not receive loading dose.   Received 750 mg 1x dose after HD on  @1431.   Will initiate vancomycin maintenance, 750 mg after each HD session schedule  @1800  Follow-up level before next HD session will be ordered on  at 0500 unless clinically indicated sooner.  Will continue to monitor renal function daily while on vancomycin and order serum creatinine at least every 48 hours if not already ordered.  Follow for continued vancomycin needs, clinical response, and signs/symptoms of toxicity.       Chasity Fernandez, PharmD

## 2024-12-03 NOTE — H&P
History Of Present Illness  Rody Espinosa is a 53 y.o. female w/ PMH of ESRD s/p failedrenal transplant in 2001, on IHD, T2DM with polyneuropathy, PAD, HLD, CAD who presented to [facility] on 11/29 for severe pain of her right second toe with foul smelling drainage.    [facility] Hospital Course:  Patient presented to the emergency department for foul-smelling drainage from the right second toe for the last few days. Patient has had soft tissue ulcer and discoloration of the toes with severe pain for the last several months. She rates her pain a 20 out of 10. She saw podiatry recently to have her toenails trimmed and he had concern for the increased pigmentation and ordered vascular studies which have not been done yet. She does see a vascular surgeon at the [facility].     In the ED, CMP showed a blood sugar of 71, chloride 93, creatinine 7.77, GFR 6, (patient did complete dialysis today) her AST is 7, total protein is 9.2, the rest of the CMP was within normal limits. Lactate is normal at 1.5. CRP was slightly elevated at 1.66 and her sed rate is elevated at 124. CBC showed a normal white count and no left shift, mild anemia with a hemoglobin 11.7 and hematocrit of 33.4, normal platelets. X-ray of the right foot showed soft tissue ulceration to the distal second toe with well-defined osteolysis involving the midportion of the adjacent distal phalanx. She was started on vancomycin and zosyn and podiatry was consulted.    Podiatry evaluated the patient and since she is on dialysis and has had fistula on her right side, the overall concern is getting vascular studies done that will potentially be detrimental. After discussion with Dr. Casanova, it was decided to transfer to [facility] for vascular surgery evaluation.  While at [facility], nephrology was also consulted to set up dialysis while inpatient, and ID was consulted who agreed with transfer to [facility] and to continue vancomycin and zosyn.    [facility]:  Upon arrival to [facility],  the patient is doing well. Resting comfortably in bed. Currently denies any pain. Notes she got dilaudid shortly before leaving [facility]. Her right 2nd toe is black and necrotic appearing. Her right upper extremity is swollen and much larger compared to left but non-pitting. She notes her arm has been that way since she had a PICC line placed in 2005 for meningitis. Denies chest pain, sob, NVD, fevers, chills.     Past Medical History  She has a past medical history of Allergic purpura (CMS-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    Surgical History  She has a past surgical history that includes Other surgical history (05/15/2013); Other surgical history (05/15/2013); Other surgical history (05/15/2013); Hysteroscopy (05/15/2013); Other surgical history (05/15/2013); Other surgical history (05/15/2013); Other surgical history (05/15/2013); Other surgical history (10/25/2018); Other surgical history (10/25/2018); and Other surgical history (10/22/2014).     Social History  She reports that she has never smoked. She has never used smokeless tobacco. She reports current alcohol use. She reports that she does not use drugs.    Family History  No family history on file.     Allergies  Betadine [povidone-iodine], Iodinated contrast media, Adhesive tape-silicones, and Iodine    Review of Systems   Per HPI    Physical Exam     GEN: Awake, alert, NAD  HEENT: Head and face visually appears swollen but patient notes this is normal for her, MMM  CARDIO: Normal rate and rhythm  RESP: Lungs CTAB, normal WOB on RA  ABD: Soft, nt, nd  EXT: Right upper extremity with 3+ non pitting edema. (Has been present for 20 years). No edema of bilat LE. R foot w/ necrotic ulceration to the second toe w/o purulent drainage. Second toe w/ slight soft tissue swelling    SKIN: Warm, dry, intact  NEURO: NFD, Aox4       Last Recorded Vitals  BP 91/57   Pulse 78   Temp 36.2 °C (97.2 °F)   Resp 16   SpO2 94%     Scheduled medications  aspirin, 81 mg, oral, Daily  atorvastatin, 40 mg, oral, Nightly  cetirizine, 5 mg, oral, Daily  docusate sodium, 100 mg, oral, BID  insulin lispro, 0-10 Units, subcutaneous, TID AC  midodrine, 5 mg, oral, TID  pantoprazole, 40 mg, oral, Daily before breakfast  piperacillin-tazobactam, 2.25 g, intravenous, q6h  pregabalin, 100 mg, oral, Nightly  sevelamer carbonate, 2,400 mg, oral, TID  timolol, 1 drop, ophthalmic (eye), BID      Continuous medications     PRN medications  PRN medications: dextrose, dextrose, glucagon, glucagon, vancomycin    Assessment/Plan     Rody Espinosa is a 53 y.o. female w/ PMH of ESRD s/p failedrenal transplant in 2001, on IHD, T2DM with polyneuropathy, PAD, HLD, CAD who is transferred from [facility] for vascular surgery evaluation iso of right foot osteomyelitis    #Osteomyelitis of right foot  #Diabetic ulcer of right second toe  -Patient notes pain in foot has been worsening over months  -About a week ago, her right second toe started draining foul smelling fluid  -ESR and CRP elevated  -X-ray of foot showed soft tissue ulceration to the distal second toe with well-defined osteolysis involving the midportion of the adjacent distal phalanx  -Evaluated by podiatry at [facility]; recommended vascular surgery at [facility]  -Started on vanc and zosyn; ID consulted and agreed to continue  -Blood culture no growth at 2 days  PLAN:  -Consult vascular surgery in AM  -MRI of right foot  -Consult ID in AM  -Continue vancomycin and zosyn  -NPO at [facility] in case of vascular intervention  -Hold AC in case of vascular intervention    #ESRD s/p failed transplant  -On dialysis MWF  -right thigh AV fistula   -Sevelamer 2400 TID at home  PLAN:  -Consult renal in AM for dialysis  -Continue home sevelamer 2400 TID    #T2DM c/b polyneuropathy  -Hold home  glipizide  -SSI  -Hypoglycemia protocol  -Continue pregabalin 100 nightly    #HTN  #Hypotension  -On metoprolol succinate 25 mg at home; Patient notes mainly for HR  -Has been held at [facility] iso of normotensive blood pressure  -Started on midodrine 5 mg TID at [facility] for hypotension  PLAN:  -Hold home metoprolol succinate 25 mg  -Continue midodrine 5 mg TID    #CAD  #PAD  #HLD  -Continue ASA 81  -Continue Atorvastatin 40 mg    F: PRN  E: PRN  N: NPO at [facility]  A: PIV, rt thigh AV fistula    DVT ppx: Hold in case of vascular procedure  GI ppx: PPI    Code status:: Full code  Contact: Montez Rosario () ([phone])      Dionte Roman MD

## 2024-12-03 NOTE — CARE PLAN
The patient's goals for the shift include      The clinical goals for the shift include pt will remain HDS.    Over the shift, the patient did not make progress toward the following goals. Barriers to progression include . Recommendations to address these barriers include .

## 2024-12-03 NOTE — CONSULTS
"Nutrition Assessment      Reason for Assessment: Admission nursing screening    Rody Espinosa is a 53 y.o. female who presented to [facility] on 11/29 for severe pain of her right second toe with foul smelling drainage.     Patient with Dry gangrene and Osteomyelitis of R foot    PMH of ESRD s/p failedrenal transplant in 2001, on IHD, T2DM with polyneuropathy, PAD, HLD, CAD     Nutrition History:  Food and Nutrient History: Spoke with patient who denies change in appetite or PO intake. Reports she generally eats \"mostly two\" meals a day at home. Patient dialyzed yesterday and states her dry weight has been 83.5 kg since the beginning of the year. \"I gain weight because of the fluids\". Offered protein supplement for additional protein for her wound, but patient declined at this time.  Vitamin/Herbal Supplement Use: Renal MVI+Iron  Food Allergies/Intolerances:  None  GI Symptoms: Diarrhea \"I think it's from the antibiotics . Plus they gave me Miralax and Colace which I didn't need.\" Reports taking Miralax at home for constipation.    Oral Problems: None    Anthropometrics:  Height: 170.2 cm (5' 7.01\")   Weight: 84.5 kg (186 lb 4.6 oz)   BMI (Calculated): 29.17  IBW/kg (Dietitian Calculated): 61.4 kg  Percent of IBW: 137.6 %  Adjusted Body Weight (kg): 67.2 kg    Weight History:   Wt Readings from Last 10 Encounters:   12/03/24 84.5 kg (186 lb 4.6 oz)   12/02/24 84.5 kg (186 lb 4.6 oz)   09/13/24 84.9 kg (187 lb 4 oz)   02/21/24 85.6 kg (188 lb 12.8 oz)   01/19/24 87.1 kg (192 lb 1.6 oz)   10/26/23 85 kg (187 lb 6.3 oz)   08/21/23 85.8 kg (189 lb 1 oz)   03/13/23 87.3 kg (192 lb 8 oz)   11/23/22 87.1 kg (192 lb)   11/16/22 85.3 kg (188 lb)       Nutrition Focused Physical Exam Findings:    Subcutaneous Fat Loss:   Orbital Fat Pads: Well nourished (slightly bulging fat pads)  Buccal Fat Pads: Well nourished (full, rounded cheeks)  Triceps: Well nourished (ample fat tissue)  Ribs: Defer  Muscle Wasting:  Temporalis: Well " nourished (well-defined muscle)  Pectoralis (Clavicular Region): Well nourished (clavicle not visible)  Deltoid/Trapezius: Well nourished (rounded appearance at arm, shoulder, neck)  Interosseous: Well nourished (muscle bulges)  Trapezius/Infraspinatus/Supraspinatus (Scapular Region): Well nourished (bones not prominent, muscle taut)  Quadriceps: Well nourished (well developed, well rounded)  Gastrocnemius: Defer  Edema:  Edema:  ( +1 b/l LE and UE)  Physical Findings:  Skin: Positive (right second toe wound)    Nutrition Significant Labs:  CBC Trend:   Results from last 7 days   Lab Units 12/03/24  0629 12/02/24  0545 11/30/24  0611 11/29/24  1843   WBC AUTO x10*3/uL 8.3 9.2 9.7 10.3   RBC AUTO x10*6/uL 3.36* 3.36* 3.25* 3.83*   HEMOGLOBIN g/dL 10.2* 10.3* 10.2* 11.7*   HEMATOCRIT % 29.5* 30.3* 28.6* 33.4*   MCV fL 88 90 88 87   PLATELETS AUTO x10*3/uL 174 177 157 191    , BMP Trend:   Results from last 7 days   Lab Units 12/03/24  0629 12/02/24  0545 12/01/24  1509 12/01/24  0519   GLUCOSE mg/dL 115* 76 114* 168*   CALCIUM mg/dL 9.7 9.5 8.6 8.7   SODIUM mmol/L 137 140 138 129*   POTASSIUM mmol/L 4.8 5.5* 4.8 4.4   CO2 mmol/L 31 27 28 27   CHLORIDE mmol/L 94* 98 97* 90*   BUN mg/dL 21 49* 42* 38*   CREATININE mg/dL 9.34* 14.67* 13.18* 11.70*    , BG POCT trend:   Results from last 7 days   Lab Units 12/03/24  1233 12/03/24  0738 12/02/24  1554 12/02/24  1258 12/02/24  0806   POCT GLUCOSE mg/dL 79 117* 140* 85 126*    , Renal Lab Trend:   Results from last 7 days   Lab Units 12/03/24  0629 12/02/24  0545 12/01/24  1509 12/01/24  0519 12/01/24  0519   POTASSIUM mmol/L 4.8 5.5* 4.8  --  4.4   PHOSPHORUS mg/dL 6.6*  --  5.1*   < >  --    SODIUM mmol/L 137 140 138  --  129*   MAGNESIUM mg/dL 1.88  --   --   --   --    EGFR mL/min/1.73m*2 5* 3* 3*  --  4*   BUN mg/dL 21 49* 42*  --  38*   CREATININE mg/dL 9.34* 14.67* 13.18*  --  11.70*    < > = values in this interval not displayed.    , Vit D: No results found for:  "\"VITD25\" , Vit B12: No results found for: \"GSPDLNLN90\"     Nutrition Specific Medications:    Scheduled medications  acetaminophen, 975 mg, oral, q8h  aspirin, 81 mg, oral, Daily  atorvastatin, 40 mg, oral, Nightly  insulin lispro, 0-10 Units, subcutaneous, TID AC  midodrine, 5 mg, oral, TID  pantoprazole, 40 mg, oral, Daily before breakfast  piperacillin-tazobactam, 2.25 g, intravenous, q8h  [Held by provider] polyethylene glycol, 17 g, oral, Daily  pregabalin, 100 mg, oral, Nightly  sevelamer carbonate, 2,400 mg, oral, TID  timolol, 1 drop, ophthalmic (eye), BID  [START ON 12/4/2024] vancomycin, 750 mg, intravenous, Once per day on Monday Wednesday Friday  vitamin B complex-vitamin C-folic acid, 1 capsule, oral, Daily      Continuous medications     PRN medications  PRN medications: dextrose, dextrose, glucagon, glucagon, ondansetron, oxyCODONE, vancomycin, white petrolatum    I/O:    ;      Dietary Orders (From admission, onward)       Start     Ordered    12/03/24 1148  May Participate in Room Service  ( ROOM SERVICE MAY PARTICIPATE)  Once        Question:  .  Answer:  Yes    12/03/24 1147    12/03/24 1112  Adult diet Regular, Renal, Consistent Carb; CCD 75 gm/meal; Potassium Restricted 2 gm (50mEq); 2 - 3 grams Sodium  Diet effective now        Question Answer Comment   Diet type Regular    Diet type Renal    Diet type Consistent Carb    Carb diet selection: CCD 75 gm/meal    Potassium restriction: Potassium Restricted 2 gm (50mEq)    Sodium restriction: 2 - 3 grams Sodium        12/03/24 1112                     Estimated Needs:   Total Energy Estimated Needs (kCal): 2000 kCal  Method for Estimating Needs: 67.2 kg / 30 kcal  Total Protein Estimated Needs (g): 90 g  Method for Estimating Needs: 67.2 kg / 1.3 g              Nutrition Diagnosis   Malnutrition Diagnosis  Patient has Malnutrition Diagnosis: No    Nutrition Diagnosis  Patient has Nutrition Diagnosis: Yes  Diagnosis Status (1): New  Nutrition " Diagnosis 1: Increased nutrient needs  Related to (1): wound healing  As Evidenced by (1): R foot osteomyelitis, R 2nd toe wound       Nutrition Interventions/Recommendations         Nutrition Prescription:  Individualized Nutrition Prescription Provided for : Continue Renal, Diabetic diet. Continue Renal MVI        Nutrition Interventions:   Interventions: Meals and snacks  Meals and Snacks: General healthful diet         Nutrition Education:   Spoke with patient about need for increased protein with wounds as well as from losses with HD.  Patient aware of good protein sources. States receiving Prostat occasionally at dialysis. Doesn't want any of [facility] protein supplements at this time, including Prostat.       Nutrition Monitoring and Evaluation   Food/Nutrient Related History Monitoring  Monitoring and Evaluation Plan: Energy intake  Criteria: >/= 75% of meals/supplements    Body Composition/Growth/Weight History  Monitoring and Evaluation Plan: Weight  Criteria: Stable weight    Biochemical Data, Medical Tests and Procedures  Monitoring and Evaluation Plan: Electrolyte/renal panel, Glucose/endocrine profile  Criteria: Labs wnl    Nutrition Focused Physical Findings  Monitoring and Evaluation Plan: Skin  Criteria: Promote wound healing         Time Spent (min): 45 minutes

## 2024-12-03 NOTE — CONSULTS
Inpatient consult to Podiatry  Consult performed by: Antonio Cox DPM  Consult ordered by: Nicol Recio DO        Reason For Consult  Right second toe gangrene    History Of Present Illness  Rody Espinosa is a 53 y.o. female presenting with gangrene of the right foot. Patient was seen at bedside today. She states that she is a diabetic with a history of vascular issues. She went to see Dr. Isai Contreras near [facility] about a month ago. He cut her nails and referred her to Dr. Bell with an order for vascular studies, which are scheduled for tomorrow. Over the past couple weeks, her right toes have been painful and the pain has increased. The second toe smelled and was turning black so she went to the [facility] ED. Podiatry at [facility] saw patient and recommended transfer to [facility], as there is no vascular service at [facility] and patient has a right leg fistula and needs vascular workup and intervention. She was transferred to [facility] and podiatry here was consulted. Patient denies any constitutional symptoms or other pedal complaints today.      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    Surgical History  She has a past surgical history that includes Other surgical history (05/15/2013); Other surgical history (05/15/2013); Other surgical history (05/15/2013); Hysteroscopy (05/15/2013); Other surgical history (05/15/2013); Other surgical history (05/15/2013); Other surgical history (05/15/2013); Other surgical history (10/25/2018); Other surgical history (10/25/2018); and Other surgical history (10/22/2014).     Social History  She reports that she has never smoked. She has never used smokeless tobacco. She reports current alcohol use. She reports that she does not use  drugs.    Family History  No family history on file.     Allergies  Betadine [povidone-iodine], Iodinated contrast media, Adhesive tape-silicones, and Iodine    Review of Systems   Constitutional: Negative.    Cardiovascular:  Positive for leg swelling.   Gastrointestinal: Negative.    Musculoskeletal: Negative.    Skin:  Positive for color change and wound.   Neurological: Negative.        Objective     Physical Exam:   General: AAOx3, no acute distress    Vasc: Dorsalis pedis and posterior tibial pulses are non-palpable bilateral.  Capillary refill time is delayed to the hallux bilateral. Skin temperature is warm to warm from proximal tibia to distal digits bilateral. There is focal edema in the right forefoot. There is dry gangrene and necrosis of the right second toe, with dusky discoloration of the third and fifth right toes.      Neuro: Light touch sensation is diminished to the foot bilateral.      Derm: Skin is supple with normal texture and turgor noted. Nails 1-5 bilateral are dystrophic. Webspaces 1-4 are clean, dry and intact bilateral. There is dry gangrene and necrosis of the right second toe, with dusky discoloration of the third and fifth right toes.  No active drainage. No purulence. No crepitus. No fluctuance. There is malodor.      Ortho: Muscle strength is +5/5 for all four pedal muscle groups bilateral. There are no structural deformities noted bilateral.    Last Recorded Vitals  Blood pressure (!) 94/44, pulse 79, temperature 36.3 °C (97.3 °F), resp. rate 16, SpO2 94%.    Relevant Results  Results for orders placed or performed during the hospital encounter of 12/03/24 (from the past 24 hours)   CBC and Auto Differential   Result Value Ref Range    WBC 8.3 4.4 - 11.3 x10*3/uL    nRBC 0.0 0.0 - 0.0 /100 WBCs    RBC 3.36 (L) 4.00 - 5.20 x10*6/uL    Hemoglobin 10.2 (L) 12.0 - 16.0 g/dL    Hematocrit 29.5 (L) 36.0 - 46.0 %    MCV 88 80 - 100 fL    MCH 30.4 26.0 - 34.0 pg    MCHC 34.6 32.0 - 36.0  g/dL    RDW 14.5 11.5 - 14.5 %    Platelets 174 150 - 450 x10*3/uL    Neutrophils % 57.5 40.0 - 80.0 %    Immature Granulocytes %, Automated 0.2 0.0 - 0.9 %    Lymphocytes % 16.2 13.0 - 44.0 %    Monocytes % 11.3 2.0 - 10.0 %    Eosinophils % 14.0 0.0 - 6.0 %    Basophils % 0.8 0.0 - 2.0 %    Neutrophils Absolute 4.76 1.20 - 7.70 x10*3/uL    Immature Granulocytes Absolute, Automated 0.02 0.00 - 0.70 x10*3/uL    Lymphocytes Absolute 1.34 1.20 - 4.80 x10*3/uL    Monocytes Absolute 0.94 0.10 - 1.00 x10*3/uL    Eosinophils Absolute 1.16 (H) 0.00 - 0.70 x10*3/uL    Basophils Absolute 0.07 0.00 - 0.10 x10*3/uL   Renal function panel   Result Value Ref Range    Glucose 115 (H) 74 - 99 mg/dL    Sodium 137 136 - 145 mmol/L    Potassium 4.8 3.5 - 5.3 mmol/L    Chloride 94 (L) 98 - 107 mmol/L    Bicarbonate 31 21 - 32 mmol/L    Anion Gap 17 10 - 20 mmol/L    Urea Nitrogen 21 6 - 23 mg/dL    Creatinine 9.34 (H) 0.50 - 1.05 mg/dL    eGFR 5 (L) >60 mL/min/1.73m*2    Calcium 9.7 8.6 - 10.6 mg/dL    Phosphorus 6.6 (H) 2.5 - 4.9 mg/dL    Albumin 3.7 3.4 - 5.0 g/dL   Magnesium   Result Value Ref Range    Magnesium 1.88 1.60 - 2.40 mg/dL   Coagulation Screen   Result Value Ref Range    Protime 12.8 9.8 - 12.8 seconds    INR 1.1 0.9 - 1.1    aPTT 36 27 - 38 seconds   C-reactive protein   Result Value Ref Range    C-Reactive Protein 1.13 (H) <1.00 mg/dL   POCT GLUCOSE   Result Value Ref Range    POCT Glucose 117 (H) 74 - 99 mg/dL     XR foot right 3+ views    Result Date: 11/29/2024  STUDY: Foot Radiographs; 11/29/2024, 6:34 PM INDICATION: 2nd toe has a ulcer. COMPARISON: None Available. ACCESSION NUMBER(S): [id number] ORDERING CLINICIAN: MEHNAZ FRANCISCO TECHNIQUE:  three view(s) of the right foot. FINDINGS:  There is soft tissue ulceration at the distal second toe, with minimal soft tissue irregularity. There is well-defined osteolysis involving the mid portion of the adjacent distal phalanx. Elsewhere, there is minimal generalized  swelling in the foot, with minimal vascular calcifications. There is minimal hallux valgus.    Soft tissue ulceration distal second toe, with well-defined osteolysis involving the mid portion of the adjacent distal phalanx Signed by Flaco Tim MD         Assessment/Plan   Principal Problem:    Osteomyelitis    #Peripheral vascular disease  #Dry gangrene, right foot  #Osteomyelitis, right foot  #Diabetes    -Patient was examined and findings were discussed with patient  -Chart, labs, and imaging were reviewed and relevant results were discussed  -Labs show WBC 8.3, CRP 1.13  -Blood cultures negative to date  -Right foot x-rays demonstrate osteomyelitis of the second distal phalanx  -Right foot MRI is ordered and pending  -Vascular surgery is on consult  -At this time, recommend daily dressing changes with gauze placed between the right foot toes and a light kerlix wrap  -Full podiatry plan is pending vascular assessment and intervention, and right foot MRI  -Podiatry will follow peripherally while awaiting the above  -Patient is at high risk of right second toe amputation versus transmetatarsal amputation  -Patient was discussed with attending, Dr. Elo Cox, DPM PGY-3

## 2024-12-03 NOTE — NURSING NOTE
Patient arrived via cart from [facility]. Alert and oriented x4,. Able to communicate needs. Medicated for pain prior to arrival. Patient have belongings via bedside. Oriented to surroundings. Patient c/o nausea PRN Zofran given. Tolerated well. Call light within reach.

## 2024-12-03 NOTE — PROGRESS NOTES
12/03/24 1039   Discharge Planning   Living Arrangements Family members   Support Systems Family members   Assistance Needed Independent for adls   Type of Residence Private residence   Number of Stairs to Enter Residence 4   Number of Stairs Within Residence 14   Home or Post Acute Services None   Expected Discharge Disposition Home   Does the patient need discharge transport arranged? No   Financial Resource Strain   How hard is it for you to pay for the very basics like food, housing, medical care, and heating? Not hard   Housing Stability   In the last 12 months, was there a time when you were not able to pay the mortgage or rent on time? N   At any time in the past 12 months, were you homeless or living in a shelter (including now)? N   Transportation Needs   In the past 12 months, has lack of transportation kept you from medical appointments or from getting medications? no   In the past 12 months, has lack of transportation kept you from meetings, work, or from getting things needed for daily living? No   Stroke Family Assessment   Stroke Family Assessment Needed No   Intensity of Service   Intensity of Service 0-30 min     Transitional Care Coordination Progress Note:  Patient discussed during interdisciplinary rounds.   Team members present: RISHI FLEMING  Plan per Medical/Surgical team: Mamie  Payor: Liborio  Discharge disposition: PT/OT pending  Potential Barriers: none  ADOD: 3-4 days    Previous Home Care: NA  DME: glucometer and testing supplies  Pharmacy: [facility]   Falls: Denies  PCP: Perfecto Mcghee; last visit more than 6 months ago  Met with patient at bedside, provided introduction of self and role. Patient states she is living at home with her niece and her  and children. Patient states her  is currently at [facility]. Patient states she is normally independent for adls; safe at home. Patient states either her sister drives her to appointments and HD or she  drives herself. Patient receives HD at [facility]. Patient states no concerns obtaining/affording medications; states no social/financial concerns. Patient states family to provide transport home at time of discharge. Will continue to monitor for discharge planning needs.     Opal YOUSIF, RN  Transitional Care Coordinator (TCC)  [phone]

## 2024-12-04 ENCOUNTER — APPOINTMENT (OUTPATIENT)
Dept: DIALYSIS | Facility: HOSPITAL | Age: 54
End: 2024-12-04
Payer: COMMERCIAL

## 2024-12-04 ENCOUNTER — APPOINTMENT (OUTPATIENT)
Dept: VASCULAR MEDICINE | Facility: HOSPITAL | Age: 54
End: 2024-12-04
Payer: COMMERCIAL

## 2024-12-04 ENCOUNTER — APPOINTMENT (OUTPATIENT)
Dept: RADIOLOGY | Facility: HOSPITAL | Age: 54
End: 2024-12-04
Payer: COMMERCIAL

## 2024-12-04 ENCOUNTER — IMMUNIZATION (OUTPATIENT)
Dept: TRANSPLANT | Facility: HOSPITAL | Age: 54
End: 2024-12-04
Payer: COMMERCIAL

## 2024-12-04 LAB
ABO GROUP (TYPE) IN BLOOD: NORMAL
ALBUMIN SERPL BCP-MCNC: 3.6 G/DL (ref 3.4–5)
ANION GAP SERPL CALC-SCNC: 20 MMOL/L (ref 10–20)
ANTIBODY SCREEN: NORMAL
APTT PPP: 36 SECONDS (ref 27–38)
BACTERIA BLD CULT: NORMAL
BACTERIA BLD CULT: NORMAL
BASOPHILS # BLD AUTO: 0.07 X10*3/UL (ref 0–0.1)
BASOPHILS NFR BLD AUTO: 0.9 %
BUN SERPL-MCNC: 43 MG/DL (ref 6–23)
CALCIUM SERPL-MCNC: 9.7 MG/DL (ref 8.6–10.6)
CHLORIDE SERPL-SCNC: 96 MMOL/L (ref 98–107)
CO2 SERPL-SCNC: 27 MMOL/L (ref 21–32)
CREAT SERPL-MCNC: 12.64 MG/DL (ref 0.5–1.05)
EGFRCR SERPLBLD CKD-EPI 2021: 3 ML/MIN/1.73M*2
EOSINOPHIL # BLD AUTO: 1.26 X10*3/UL (ref 0–0.7)
EOSINOPHIL NFR BLD AUTO: 15.8 %
ERYTHROCYTE [DISTWIDTH] IN BLOOD BY AUTOMATED COUNT: 14.2 % (ref 11.5–14.5)
GLUCOSE BLD MANUAL STRIP-MCNC: 127 MG/DL (ref 74–99)
GLUCOSE BLD MANUAL STRIP-MCNC: 155 MG/DL (ref 74–99)
GLUCOSE BLD MANUAL STRIP-MCNC: 210 MG/DL (ref 74–99)
GLUCOSE SERPL-MCNC: 124 MG/DL (ref 74–99)
HBV SURFACE AB SER-ACNC: 29.7 MIU/ML
HBV SURFACE AG SERPL QL IA: NONREACTIVE
HCT VFR BLD AUTO: 29.8 % (ref 36–46)
HGB BLD-MCNC: 10.8 G/DL (ref 12–16)
IMM GRANULOCYTES # BLD AUTO: 0.02 X10*3/UL (ref 0–0.7)
IMM GRANULOCYTES NFR BLD AUTO: 0.3 % (ref 0–0.9)
INR PPP: 1.1 (ref 0.9–1.1)
LYMPHOCYTES # BLD AUTO: 1.4 X10*3/UL (ref 1.2–4.8)
LYMPHOCYTES NFR BLD AUTO: 17.6 %
MAGNESIUM SERPL-MCNC: 2.49 MG/DL (ref 1.6–2.4)
MCH RBC QN AUTO: 31.5 PG (ref 26–34)
MCHC RBC AUTO-ENTMCNC: 36.2 G/DL (ref 32–36)
MCV RBC AUTO: 87 FL (ref 80–100)
MONOCYTES # BLD AUTO: 0.94 X10*3/UL (ref 0.1–1)
MONOCYTES NFR BLD AUTO: 11.8 %
NEUTROPHILS # BLD AUTO: 4.26 X10*3/UL (ref 1.2–7.7)
NEUTROPHILS NFR BLD AUTO: 53.6 %
NRBC BLD-RTO: 0 /100 WBCS (ref 0–0)
PHOSPHATE SERPL-MCNC: 8.3 MG/DL (ref 2.5–4.9)
PLATELET # BLD AUTO: 196 X10*3/UL (ref 150–450)
POTASSIUM SERPL-SCNC: 5.2 MMOL/L (ref 3.5–5.3)
PROTHROMBIN TIME: 12.8 SECONDS (ref 9.8–12.8)
RBC # BLD AUTO: 3.43 X10*6/UL (ref 4–5.2)
RH FACTOR (ANTIGEN D): NORMAL
SODIUM SERPL-SCNC: 138 MMOL/L (ref 136–145)
VANCOMYCIN SERPL-MCNC: 30.8 UG/ML (ref 5–20)
WBC # BLD AUTO: 8 X10*3/UL (ref 4.4–11.3)

## 2024-12-04 PROCEDURE — 8010000001 HC DIALYSIS - HEMODIALYSIS PER DAY

## 2024-12-04 PROCEDURE — 2500000005 HC RX 250 GENERAL PHARMACY W/O HCPCS

## 2024-12-04 PROCEDURE — A9575 INJ GADOTERATE MEGLUMI 0.1ML: HCPCS | Performed by: STUDENT IN AN ORGANIZED HEALTH CARE EDUCATION/TRAINING PROGRAM

## 2024-12-04 PROCEDURE — 93990 DOPPLER FLOW TESTING: CPT

## 2024-12-04 PROCEDURE — 82947 ASSAY GLUCOSE BLOOD QUANT: CPT

## 2024-12-04 PROCEDURE — 1100000001 HC PRIVATE ROOM DAILY

## 2024-12-04 PROCEDURE — 90935 HEMODIALYSIS ONE EVALUATION: CPT | Performed by: INTERNAL MEDICINE

## 2024-12-04 PROCEDURE — 2500000001 HC RX 250 WO HCPCS SELF ADMINISTERED DRUGS (ALT 637 FOR MEDICARE OP)

## 2024-12-04 PROCEDURE — 2500000001 HC RX 250 WO HCPCS SELF ADMINISTERED DRUGS (ALT 637 FOR MEDICARE OP): Performed by: STUDENT IN AN ORGANIZED HEALTH CARE EDUCATION/TRAINING PROGRAM

## 2024-12-04 PROCEDURE — 86900 BLOOD TYPING SEROLOGIC ABO: CPT

## 2024-12-04 PROCEDURE — 2500000004 HC RX 250 GENERAL PHARMACY W/ HCPCS (ALT 636 FOR OP/ED): Performed by: STUDENT IN AN ORGANIZED HEALTH CARE EDUCATION/TRAINING PROGRAM

## 2024-12-04 PROCEDURE — 2550000001 HC RX 255 CONTRASTS: Performed by: STUDENT IN AN ORGANIZED HEALTH CARE EDUCATION/TRAINING PROGRAM

## 2024-12-04 PROCEDURE — 73720 MRI LWR EXTREMITY W/O&W/DYE: CPT | Mod: RT

## 2024-12-04 PROCEDURE — 85610 PROTHROMBIN TIME: CPT

## 2024-12-04 PROCEDURE — 85730 THROMBOPLASTIN TIME PARTIAL: CPT

## 2024-12-04 PROCEDURE — 83735 ASSAY OF MAGNESIUM: CPT

## 2024-12-04 PROCEDURE — 73720 MRI LWR EXTREMITY W/O&W/DYE: CPT | Mod: RIGHT SIDE | Performed by: RADIOLOGY

## 2024-12-04 PROCEDURE — 82607 VITAMIN B-12: CPT

## 2024-12-04 PROCEDURE — 86706 HEP B SURFACE ANTIBODY: CPT | Performed by: INTERNAL MEDICINE

## 2024-12-04 PROCEDURE — 87340 HEPATITIS B SURFACE AG IA: CPT | Performed by: INTERNAL MEDICINE

## 2024-12-04 PROCEDURE — 80069 RENAL FUNCTION PANEL: CPT

## 2024-12-04 PROCEDURE — 93990 DOPPLER FLOW TESTING: CPT | Performed by: INTERNAL MEDICINE

## 2024-12-04 PROCEDURE — 85025 COMPLETE CBC W/AUTO DIFF WBC: CPT

## 2024-12-04 PROCEDURE — 80202 ASSAY OF VANCOMYCIN: CPT

## 2024-12-04 PROCEDURE — 99233 SBSQ HOSP IP/OBS HIGH 50: CPT

## 2024-12-04 PROCEDURE — 36415 COLL VENOUS BLD VENIPUNCTURE: CPT

## 2024-12-04 PROCEDURE — 2500000002 HC RX 250 W HCPCS SELF ADMINISTERED DRUGS (ALT 637 FOR MEDICARE OP, ALT 636 FOR OP/ED)

## 2024-12-04 PROCEDURE — 86901 BLOOD TYPING SEROLOGIC RH(D): CPT

## 2024-12-04 PROCEDURE — 2500000004 HC RX 250 GENERAL PHARMACY W/ HCPCS (ALT 636 FOR OP/ED)

## 2024-12-04 RX ORDER — GADOTERATE MEGLUMINE 376.9 MG/ML
20 INJECTION INTRAVENOUS
Status: COMPLETED | OUTPATIENT
Start: 2024-12-04 | End: 2024-12-04

## 2024-12-04 RX ORDER — OXYCODONE HYDROCHLORIDE 5 MG/1
10 TABLET ORAL EVERY 6 HOURS PRN
Status: DISCONTINUED | OUTPATIENT
Start: 2024-12-04 | End: 2024-12-06 | Stop reason: HOSPADM

## 2024-12-04 RX ORDER — OXYCODONE HYDROCHLORIDE 5 MG/1
5 TABLET ORAL EVERY 4 HOURS PRN
Status: DISCONTINUED | OUTPATIENT
Start: 2024-12-04 | End: 2024-12-06 | Stop reason: HOSPADM

## 2024-12-04 ASSESSMENT — COGNITIVE AND FUNCTIONAL STATUS - GENERAL
MOBILITY SCORE: 24
DAILY ACTIVITIY SCORE: 24

## 2024-12-04 ASSESSMENT — PAIN - FUNCTIONAL ASSESSMENT: PAIN_FUNCTIONAL_ASSESSMENT: 0-10

## 2024-12-04 ASSESSMENT — PAIN DESCRIPTION - ORIENTATION
ORIENTATION: RIGHT;LEFT
ORIENTATION: RIGHT;LEFT

## 2024-12-04 ASSESSMENT — PAIN SCALES - GENERAL
PAINLEVEL_OUTOF10: 10 - WORST POSSIBLE PAIN
PAINLEVEL_OUTOF10: 6
PAINLEVEL_OUTOF10: 10 - WORST POSSIBLE PAIN
PAINLEVEL_OUTOF10: 10 - WORST POSSIBLE PAIN
PAINLEVEL_OUTOF10: 4

## 2024-12-04 ASSESSMENT — PAIN DESCRIPTION - LOCATION
LOCATION: FOOT
LOCATION: FOOT

## 2024-12-04 ASSESSMENT — PAIN SCALES - WONG BAKER: WONGBAKER_NUMERICALRESPONSE: HURTS LITTLE BIT

## 2024-12-04 ASSESSMENT — PAIN DESCRIPTION - DESCRIPTORS: DESCRIPTORS: ACHING;THROBBING;PRESSURE;SHARP

## 2024-12-04 ASSESSMENT — PAIN SCALES - PAIN ASSESSMENT IN ADVANCED DEMENTIA (PAINAD): TOTALSCORE: MEDICATION (SEE MAR)

## 2024-12-04 NOTE — PROGRESS NOTES
Lab Results   Component Value Date    HEPBSAB 29.7 (H) 12/04/2024     Called and spoke with patient and she can not recall where her immunizations were done.

## 2024-12-04 NOTE — CARE PLAN
The patient's goals for the shift include      The clinical goals for the shift include Pt will remain safe and hds throughout shift.      Problem: Pain - Adult  Goal: Verbalizes/displays adequate comfort level or baseline comfort level  Outcome: Progressing     Problem: Safety - Adult  Goal: Free from fall injury  Outcome: Progressing     Problem: Discharge Planning  Goal: Discharge to home or other facility with appropriate resources  Outcome: Progressing     Problem: Chronic Conditions and Co-morbidities  Goal: Patient's chronic conditions and co-morbidity symptoms are monitored and maintained or improved  Outcome: Progressing     Problem: Skin  Goal: Decreased wound size/increased tissue granulation at next dressing change  Outcome: Progressing  Goal: Participates in plan/prevention/treatment measures  Outcome: Progressing  Goal: Prevent/manage excess moisture  Outcome: Progressing  Goal: Prevent/minimize sheer/friction injuries  Outcome: Progressing  Goal: Promote/optimize nutrition  Outcome: Progressing  Goal: Promote skin healing  Outcome: Progressing

## 2024-12-04 NOTE — CONSULTS
Inpatient consult to Nephrology Dialysis  Consult performed by: Terry Smith MD  Consult ordered by: Nicol Recio DO      NEPHROLOGY NEW CONSULT NOTE   Patient ID: Rody Espinosa is a 53 y.o. female.     Reason for consult: ESRD-HD    HPI  Rody Espinosa is a 53 y.o. female   - With past medical Hx as below   - Admitted for osteomyelitis  - Nephrology was consulted for ESRD management     [facility] is her primary nephrologist; Last HD 12/2 post weight 80.5; DW 83.5; Right thigh AVG. Labs: K 5.5 (12/2), Hgb 10.2 (12/3), Mircera 75mcg Q 2 weeks currently on hold at [facility].     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[facility])     Streptococcal meningitis      Past Surgical History:   Procedure Laterality Date    HYSTEROSCOPY  05/15/2013    Hysteroscopy With Endometrial Ablation    OTHER SURGICAL HISTORY  05/15/2013    Renal Transplant    OTHER SURGICAL HISTORY  05/15/2013    Parathyroid Resection Sub-Total Parathyroidectomy    OTHER SURGICAL HISTORY  05/15/2013    Parathyroid Surgery    OTHER SURGICAL HISTORY  05/15/2013    Repair Of Brow Ptosis Right Upper Eyelid    OTHER SURGICAL HISTORY  05/15/2013    Cardiac Cath Lesion 1, 1st Adjunct Treat Device: Stent    OTHER SURGICAL HISTORY  05/15/2013    Biopsy Renal    OTHER SURGICAL HISTORY  10/25/2018    Hemodialysis Access Type Arteriovenous Graft Right Thigh    OTHER SURGICAL HISTORY  10/25/2018    Hemodialysis Access Type Arteriovenous Fistula Left Arm    OTHER SURGICAL HISTORY  10/22/2014    Percutaneous Transluminal Angioplasty  "(Non-Coronary)      No family history on file.    Allergies   Allergen Reactions    Betadine [Povidone-Iodine] Unknown    Iodinated Contrast Media Itching    Adhesive Tape-Silicones Rash    Iodine Rash     Mild rash        Scheduled medications  acetaminophen, 975 mg, oral, q8h  aspirin, 81 mg, oral, Daily  atorvastatin, 40 mg, oral, Nightly  insulin lispro, 0-10 Units, subcutaneous, TID AC  midodrine, 5 mg, oral, TID  pantoprazole, 40 mg, oral, Daily before breakfast  piperacillin-tazobactam, 2.25 g, intravenous, q8h  [Held by provider] polyethylene glycol, 17 g, oral, Daily  pregabalin, 100 mg, oral, Nightly  sevelamer carbonate, 2,400 mg, oral, TID  timolol, 1 drop, ophthalmic (eye), BID  [START ON 12/4/2024] vancomycin, 750 mg, intravenous, Once per day on Monday Wednesday Friday  vitamin B complex-vitamin C-folic acid, 1 capsule, oral, Daily      Continuous medications     PRN medications  PRN medications: dextrose, dextrose, glucagon, glucagon, ondansetron, oxyCODONE, vancomycin, white petrolatum     Heart Rate:  [78-89]   Temp:  [36.2 °C (97.2 °F)-36.6 °C (97.9 °F)]   Resp:  [16-18]   BP: ()/(44-97)   Height:  [170.2 cm (5' 7.01\")]   Weight:  [84.5 kg (186 lb 4.6 oz)]   SpO2:  [94 %-100 %]    Weight: 84.5 kg (186 lb 4.6 oz)   General appearance: Awake and alert, oriented, . No distress  HEENT: supple, moist oral mucosa, no mouth ulcers  Neck: No JVD  Skin: no apparent rash  Heart: heart sounds 1 & 2 present and normal, no murmurs heard or friction rub  Lungs: Adequate air entry,  no wheezing/crackles  Abdomen: soft, non tender, no masses palpated, no flank tenderness  Extremities: No  edema, no joint swelling,  ACCESS: RT thigh AV graft       Results from last 72 hours   Lab Units 12/03/24  0629   SODIUM mmol/L 137   POTASSIUM mmol/L 4.8   CO2 mmol/L 31   BUN mg/dL 21   CREATININE mg/dL 9.34*   PHOSPHORUS mg/dL 6.6*   CALCIUM mg/dL 9.7   ALBUMIN g/dL 3.7   GLUCOSE mg/dL 115*   WBC AUTO x10*3/uL 8.3    "     A/P  ESRD-HD admitted with osteomyelitis    Plan HD per submitted orders 12/4, UF   as tolerated  MBD - Phosphorus binder: continue with Sevelamer q AC  Access: no issue  BP: acceptable  Renal Diet   Daily renal MVI   Continue 3 x per week hemodialysis; SW to ensure availability of o/p HD chair at discharge    Terry Smith MD

## 2024-12-04 NOTE — SIGNIFICANT EVENT
The patient was unavailable for evaluation today as she was in the dialysis unit undergoing dialysis. The full podiatry plan is pending vascular assessment and intervention, and a right foot MRI.     Candido Murphy DPM PGY-1  Podiatric Medicine and Surgery   [facility] Chat

## 2024-12-04 NOTE — CARE PLAN
Problem: Pain - Adult  Goal: Verbalizes/displays adequate comfort level or baseline comfort level  Outcome: Progressing     Problem: Safety - Adult  Goal: Free from fall injury  Outcome: Progressing     Problem: Discharge Planning  Goal: Discharge to home or other facility with appropriate resources  Outcome: Progressing     Problem: Chronic Conditions and Co-morbidities  Goal: Patient's chronic conditions and co-morbidity symptoms are monitored and maintained or improved  Outcome: Progressing   The patient's goals for the shift include      The clinical goals for the shift include Pt will remain free from falls and injury throughout shift

## 2024-12-04 NOTE — PROGRESS NOTES
VASCULAR SURGERY PROGRESS NOTE  Assessment/Plan   Rody Espinosa is 53 y.o. female with history ofhistory of ESRD (failed transplant in 2021, multiple previous access sites on LUE and currently RLE), HTN, HLD, CAD (MI in 2006, PCI with DESx2), DM, HOLLY who presents as a transfer from [facility] for R 2nd toe gangrene.    PVR of RLE obtained suggesting arterial disease with monophasic flow    Plan:  Duplex ultrasound of the AVF to assess flow  Agree with podiatry consult  Continue antibiotics  Will continue to follow     D/w attending, Dr. Tracie Bland MD  General Surgery PGY-3  Vascular Surgery [id number]    Subjective   No issues. No worsening pain in RLE, just ongoing pain. No pain in RLE with dialysis. Should get dialysis today.    Objective   Vitals:  Heart Rate:  [80-87]   Temp:  [35.1 °C (95.2 °F)-36.6 °C (97.9 °F)]   Resp:  [14-16]   BP: (110-133)/(57-83)   SpO2:  [93 %-97 %]     Exam:  Constitutional: No acute distress, resting comfortably  Neuro:  AOx3, grossly intact  ENMT: moist mucous membranes  Head/neck: atraumatic  CV: no tachycardia  Pulm: non-labored on room air  GI: soft, non-tender, non-distended  Skin: warm and dry  Musculoskeletal: moving all extremities  Extremities: L femoral, DP palpable. R fistula with palpable thrill    Labs:  Results from last 7 days   Lab Units 12/04/24  0554 12/03/24  0629 12/02/24  0545   WBC AUTO x10*3/uL 8.0 8.3 9.2   HEMOGLOBIN g/dL 10.8* 10.2* 10.3*   PLATELETS AUTO x10*3/uL 196 174 177      Results from last 7 days   Lab Units 12/04/24  0554 12/03/24  0629 12/02/24  0545 12/02/24  0545   SODIUM mmol/L 138 137  --  140   POTASSIUM mmol/L 5.2 4.8  --  5.5*   CHLORIDE mmol/L 96* 94*  --  98   CO2 mmol/L 27 31  --  27   BUN mg/dL 43* 21  --  49*   CREATININE mg/dL 12.64* 9.34*  --  14.67*   GLUCOSE mg/dL 124* 115*  --  76   MAGNESIUM mg/dL 2.49* 1.88   < >  --    PHOSPHORUS mg/dL 8.3* 6.6*  --   --     < > = values in this interval not displayed.      Results  from last 7 days   Lab Units 12/04/24  0554 12/03/24  0629   INR  1.1 1.1   PROTIME seconds 12.8 12.8   APTT seconds 36 36

## 2024-12-04 NOTE — PROGRESS NOTES
Physical Therapy                 Therapy Communication Note    Patient Name: Rody Espinosa  MRN: [id number]  Department: [id number]  Room: [address] [place] [address]  Today's Date: 12/4/2024     Discipline: Physical Therapy    Missed Visit Reason: Missed Visit Reason: Other (Comment) (Pt off the floor)    Missed Time: 10:40 AM

## 2024-12-04 NOTE — NURSING NOTE
Report to Receiving RN:    Report To: TODD Porter  Time Report Called: 1800  Hand-Off Communication: Pt Jesús completed 3 hrs 54 mins HD tx, 471 ml fluid removed, pt reported pain 10/10 in both feet, oxy 5 mg administered, tylenol 975 mg given, /53, HR 88, pt stable, A/O.   Complications During Treatment: Yes  Ultrafiltration Treatment: Yes  Medications Administered During Dialysis: Yes  Blood Products Administered During Dialysis: N/A  Labs Sent During Dialysis: No  Heparin Drip Rate Changes: N/A  Dialysis Catheter Dressing: N/A, AVF  Last Dressing Change: N/A      Last Updated: 5:59 PM by LORIE WANG

## 2024-12-04 NOTE — NURSING NOTE
Report from Sending RN:    Report From: IR called is sending pt here  Recent Surgery of Procedure: No  Baseline Level of Consciousness (LOC): A and O x 4  Oxygen Use: No  Type: ra  Diabetic: Yes  Last BP Med Given Day of Dialysis: See emar  Last Pain Med Given: none  Lab Tests to be Obtained with Dialysis: No  Blood Transfusion to be Given During Dialysis: No  Available IV Access: Yes  Medications to be Administered During Dialysis: No  Continuous IV Infusion Running: No  Restraints on Currently or in the Last 24 Hours: No  Hand-Off Communication: IR assessed pt and she is stable for dialysis  Dialysis Catheter Dressing: na  Last Dressing Change: na

## 2024-12-04 NOTE — PROGRESS NOTES
Communication Note    Patient Name: Rody Espinosa  MRN: [id number]  Today's Date: 12/4/2024   Room: [address] [place] [address]    Discipline: Occupational Therapy      Missed Visit Reason: Other (Comment) (1055: Patient off floor.)    Per communication via secure chat with Dr. Cox from Podiatry, patient is WBAT on R foot.       12/04/24 at 2:53 PM   Darlene Perry, OT   Rehab Office: [phone]

## 2024-12-04 NOTE — PROGRESS NOTES
Rody Espinosa is a 53 y.o. female w/ PMH of ESRD s/p failedrenal transplant in 2001, on IHD, T2DM with polyneuropathy, PAD, HLD, CAD who presented to [facility] on 11/29 for severe pain of her right second toe with foul smelling drainage. She was transferred from [facility] on 12/2 overnight for vascular surgery evaluation.      Subjective   Patient seen at bedside this morning. Her pain is currently under control. She denies fevers/chills, chest pain, shortness of breath, abdominal pain, constipation.        Objective     Last Recorded Vitals  /79   Pulse 83   Temp 35.9 °C (96.6 °F)   Resp 16   Wt 84.5 kg (186 lb 4.6 oz)   SpO2 95%   Intake/Output last 3 Shifts:  No intake or output data in the 24 hours ending 12/04/24 0637    Admission Weight  Weight: 84.5 kg (186 lb 4.6 oz) (12/03/24 1144)    Daily Weight  12/03/24 : 84.5 kg (186 lb 4.6 oz)    Image Results  Vascular US Ankle Brachial Index (DHARMESH) Without Exercise  Preliminary Cardiology Report              [place] [address]    Tel [phone] and Fax [phone]                 Preliminary Vascular Lab Report     [facility] US ANKLE BRACHIAL INDEX (DHARMESH) WITHOUT EXERCISE       Patient Name:     RODY CRUZ        Tonny Physician: [id number] MARY Oconnell MD                              RPVI  Study Date:       12/3/2024         Ordering           [id number] DANNY WALKER                                      Physician:  MRN/PID:          [id number]          Technologist:      Dary Gonzalez T  Accession#:       [id number]      Technologist 2:  Date of           1970         Encounter#:        [id number]  Birth/Age:  Gender:           F  Admission Status: Inpatient         Location           [facility]                                      Performed:       Diagnosis/ICD: Peripheral vascular disease, unspecified-I73.9  Procedure/CPT: 34356 Peripheral artery DHARMESH Only        PRELIMINARY CONCLUSIONS:  Right Lower PVR: Evidence of moderate arterial occlusive disease in the right lower extremity at rest. Decreased digital perfusion noted. Lymphedema noted right arm. AVG noted in right groin. Unable to do DHARMESH due unable to obtain brachial pressures. Level of disease is called off of PVR waveforms and Doppler tracings only.  Left Lower PVR: No evidence of arterial occlusive disease in the left lower extremity at rest. Decreased digital perfusion noted. Unable to do DHARMESH due unable to obtain brachial pressures. Level of disease is called off of PVR waveforms and Doppler tracings only. AVF noted in left arm.     Imaging & Doppler Findings:     RIGHT Lower PVR                Pressures  Right Posterior Tibial (Ankle) 157 mmHg  Right Dorsalis Pedis (Ankle)   117 mmHg  Right Digit (Great Toe)        24 mmHg          LEFT Lower PVR                Pressures  Left High Thigh               103 mmHg  Left Posterior Tibial (Ankle) 123 mmHg  Left Dorsalis Pedis (Ankle)   89 mmHg  Left Digit (Great Toe)        38 mmHg            VASCULAR PRELIMINARY REPORT  completed by Dary Gonzalez RVLEN on 12/3/2024 at 4:09:53 PM       ** Final **      Physical Exam  Constitutional:       General: She is not in acute distress.     Appearance: Normal appearance.   HENT:      Head: Normocephalic and atraumatic.      Mouth/Throat:      Mouth: Mucous membranes are moist.   Eyes:      Extraocular Movements: Extraocular movements intact.      Pupils: Pupils are equal, round, and reactive to light.      Comments: Patient is blind in L eye   Cardiovascular:      Rate and Rhythm: Normal rate and regular rhythm.      Heart sounds: Normal heart sounds.   Pulmonary:      Effort: Pulmonary effort is normal.      Breath sounds: Normal breath sounds.   Abdominal:      General: Abdomen is flat.      Palpations: Abdomen is soft.   Musculoskeletal:      Comments: Patient has non-pitting edema in her RUE, at her baseline per patient    R  second toe necrotic wound, currently wrapped. See media tab for images.   Skin:     General: Skin is warm and dry.   Neurological:      General: No focal deficit present.      Mental Status: She is alert and oriented to person, place, and time.       Relevant Results               Assessment/Plan      Assessment & Plan  Osteomyelitis    Peripheral vascular disease (CMS-HCC)    Hemodialysis-associated hypotension    Coronary artery disease involving native coronary artery of native heart without angina pectoris    Osteomyelitis of right foot (Multi)    ESRD (end stage renal disease) on dialysis (Multi)    Controlled type 2 diabetes mellitus with diabetic polyneuropathy, without long-term current use of insulin    Diabetic ulcer of toe associated with diabetes mellitus due to underlying condition, limited to breakdown of skin    Rody ANTHONY Espinosa is a 53 y.o. female w/ PMH of ESRD s/p failed renal transplant in 2001, on IHD, T2DM with polyneuropathy, PAD, HLD, CAD who is transferred from [facility] for vascular surgery evaluation iso of right second toe gangrene concerning for OM.    Updates 12/4:  -Dialysis today  -MRI R foot still pending  -Duplex ultrasound of AVF per vascular surgery  -Podiatry following, plan pending MRI and vascular surgery plan    #Concern for osteomyelitis of right foot  #Diabetic ulcer of right second toe  -Patient notes pain in foot has been worsening over months  -About a week ago, her right second toe started draining foul smelling fluid  -ESR and CRP elevated  -X-ray of foot showed soft tissue ulceration to the distal second toe with well-defined osteolysis involving the midportion of the adjacent distal phalanx  -Evaluated by podiatry at [facility]; recommended vascular surgery at [facility]  -Started on vanc and zosyn; ID consulted at [facility] and agreed to continue  -Blood culture no growth at 4 days  PLAN:  -MRI R foot still pending  -Duplex ultrasound of AVF per vascular surgery  -Podiatry  following, plan pending MRI and vascular surgery plan  -Continue vancomycin and zosyn     #ESRD s/p failed transplant  -On dialysis MWF  -right thigh AVG   -Sevelamer 2400 TID at home  PLAN:  -Nephro consulted for dialysis MWF  -Continue home sevelamer 2400 TID     #T2DM c/b polyneuropathy  -Hold home glipizide  -SSI  -Hypoglycemia protocol  -Continue pregabalin 100 nightly     #HTN  #Hypotension  -On metoprolol succinate 25 mg at home; Patient notes mainly for HR  -Has been held at [facility] iso of normotensive blood pressure  -Started on midodrine 5 mg TID at [facility] for hypotension  PLAN:  -Hold home metoprolol succinate 25 mg  -Continue midodrine 5 mg TID     #CAD  #PAD  #HLD  -Continue ASA 81  -Continue Atorvastatin 40 mg     F: PRN  E: PRN  N: Renal, consistent carb diet  A: PIV, rt thigh AVG     DVT ppx: Hold in case of vascular procedure  GI ppx: PPI     Code status: Full code  Contact: Montez Rosario () ([phone])            Sin Kovacs MD  PGY-1

## 2024-12-04 NOTE — PROGRESS NOTES
Physical Therapy                 Therapy Communication Note    Patient Name: Rody Espinosa  MRN: [id number]  Department: [facility] [place] [facility]  Room: [address] [place] [address]-A  Today's Date: 12/4/2024     Discipline: Physical Therapy    Missed Visit Reason: Missed Visit Reason: Other (Comment) (Pt off the fllor at dialysis)    Missed Time:14:40 PM

## 2024-12-05 ENCOUNTER — DOCUMENTATION (OUTPATIENT)
Dept: TRANSPLANT | Facility: HOSPITAL | Age: 54
End: 2024-12-05

## 2024-12-05 ENCOUNTER — APPOINTMENT (OUTPATIENT)
Dept: VASCULAR MEDICINE | Facility: HOSPITAL | Age: 54
End: 2024-12-05
Payer: COMMERCIAL

## 2024-12-05 ENCOUNTER — ANESTHESIA EVENT (OUTPATIENT)
Dept: OPERATING ROOM | Facility: HOSPITAL | Age: 54
End: 2024-12-05

## 2024-12-05 PROBLEM — I73.9 PERIPHERAL VASCULAR DISEASE, UNSPECIFIED (CMS-HCC): Status: ACTIVE | Noted: 2024-12-01

## 2024-12-05 LAB
ALBUMIN SERPL BCP-MCNC: 3.6 G/DL (ref 3.4–5)
ANION GAP SERPL CALC-SCNC: 17 MMOL/L (ref 10–20)
BASOPHILS # BLD AUTO: 0.08 X10*3/UL (ref 0–0.1)
BASOPHILS NFR BLD AUTO: 1 %
BUN SERPL-MCNC: 18 MG/DL (ref 6–23)
CALCIUM SERPL-MCNC: 9.8 MG/DL (ref 8.6–10.6)
CHLORIDE SERPL-SCNC: 99 MMOL/L (ref 98–107)
CO2 SERPL-SCNC: 29 MMOL/L (ref 21–32)
CREAT SERPL-MCNC: 7.48 MG/DL (ref 0.5–1.05)
EGFRCR SERPLBLD CKD-EPI 2021: 6 ML/MIN/1.73M*2
EOSINOPHIL # BLD AUTO: 1.36 X10*3/UL (ref 0–0.7)
EOSINOPHIL NFR BLD AUTO: 17.5 %
ERYTHROCYTE [DISTWIDTH] IN BLOOD BY AUTOMATED COUNT: 14.2 % (ref 11.5–14.5)
GLUCOSE BLD MANUAL STRIP-MCNC: 107 MG/DL (ref 74–99)
GLUCOSE BLD MANUAL STRIP-MCNC: 122 MG/DL (ref 74–99)
GLUCOSE BLD MANUAL STRIP-MCNC: 149 MG/DL (ref 74–99)
GLUCOSE BLD MANUAL STRIP-MCNC: 169 MG/DL (ref 74–99)
GLUCOSE SERPL-MCNC: 95 MG/DL (ref 74–99)
HCT VFR BLD AUTO: 29 % (ref 36–46)
HGB BLD-MCNC: 10.1 G/DL (ref 12–16)
IMM GRANULOCYTES # BLD AUTO: 0.02 X10*3/UL (ref 0–0.7)
IMM GRANULOCYTES NFR BLD AUTO: 0.3 % (ref 0–0.9)
LYMPHOCYTES # BLD AUTO: 1.48 X10*3/UL (ref 1.2–4.8)
LYMPHOCYTES NFR BLD AUTO: 19 %
MAGNESIUM SERPL-MCNC: 2.42 MG/DL (ref 1.6–2.4)
MCH RBC QN AUTO: 31.3 PG (ref 26–34)
MCHC RBC AUTO-ENTMCNC: 34.8 G/DL (ref 32–36)
MCV RBC AUTO: 90 FL (ref 80–100)
MONOCYTES # BLD AUTO: 0.92 X10*3/UL (ref 0.1–1)
MONOCYTES NFR BLD AUTO: 11.8 %
NEUTROPHILS # BLD AUTO: 3.93 X10*3/UL (ref 1.2–7.7)
NEUTROPHILS NFR BLD AUTO: 50.4 %
NRBC BLD-RTO: 0 /100 WBCS (ref 0–0)
PHOSPHATE SERPL-MCNC: 5.5 MG/DL (ref 2.5–4.9)
PLATELET # BLD AUTO: 172 X10*3/UL (ref 150–450)
POTASSIUM SERPL-SCNC: 4.2 MMOL/L (ref 3.5–5.3)
RBC # BLD AUTO: 3.23 X10*6/UL (ref 4–5.2)
SODIUM SERPL-SCNC: 141 MMOL/L (ref 136–145)
VIT B12 SERPL-MCNC: 1098 PG/ML (ref 211–911)
WBC # BLD AUTO: 7.8 X10*3/UL (ref 4.4–11.3)

## 2024-12-05 PROCEDURE — 97161 PT EVAL LOW COMPLEX 20 MIN: CPT | Mod: GP | Performed by: PHYSICAL THERAPIST

## 2024-12-05 PROCEDURE — 2500000001 HC RX 250 WO HCPCS SELF ADMINISTERED DRUGS (ALT 637 FOR MEDICARE OP)

## 2024-12-05 PROCEDURE — 93922 UPR/L XTREMITY ART 2 LEVELS: CPT | Performed by: SURGERY

## 2024-12-05 PROCEDURE — 1100000001 HC PRIVATE ROOM DAILY

## 2024-12-05 PROCEDURE — 85025 COMPLETE CBC W/AUTO DIFF WBC: CPT

## 2024-12-05 PROCEDURE — 2500000001 HC RX 250 WO HCPCS SELF ADMINISTERED DRUGS (ALT 637 FOR MEDICARE OP): Performed by: STUDENT IN AN ORGANIZED HEALTH CARE EDUCATION/TRAINING PROGRAM

## 2024-12-05 PROCEDURE — 36415 COLL VENOUS BLD VENIPUNCTURE: CPT

## 2024-12-05 PROCEDURE — 2500000002 HC RX 250 W HCPCS SELF ADMINISTERED DRUGS (ALT 637 FOR MEDICARE OP, ALT 636 FOR OP/ED)

## 2024-12-05 PROCEDURE — 2500000004 HC RX 250 GENERAL PHARMACY W/ HCPCS (ALT 636 FOR OP/ED)

## 2024-12-05 PROCEDURE — 93922 UPR/L XTREMITY ART 2 LEVELS: CPT

## 2024-12-05 PROCEDURE — 97165 OT EVAL LOW COMPLEX 30 MIN: CPT | Mod: GO

## 2024-12-05 PROCEDURE — 84100 ASSAY OF PHOSPHORUS: CPT

## 2024-12-05 PROCEDURE — 83735 ASSAY OF MAGNESIUM: CPT

## 2024-12-05 PROCEDURE — 99233 SBSQ HOSP IP/OBS HIGH 50: CPT | Performed by: NURSE PRACTITIONER

## 2024-12-05 PROCEDURE — 82947 ASSAY GLUCOSE BLOOD QUANT: CPT

## 2024-12-05 PROCEDURE — 99232 SBSQ HOSP IP/OBS MODERATE 35: CPT

## 2024-12-05 PROCEDURE — 82374 ASSAY BLOOD CARBON DIOXIDE: CPT

## 2024-12-05 ASSESSMENT — COGNITIVE AND FUNCTIONAL STATUS - GENERAL
WALKING IN HOSPITAL ROOM: A LITTLE
STANDING UP FROM CHAIR USING ARMS: A LITTLE
DAILY ACTIVITIY SCORE: 24
CLIMB 3 TO 5 STEPS WITH RAILING: A LOT
MOVING TO AND FROM BED TO CHAIR: A LITTLE
MOBILITY SCORE: 19

## 2024-12-05 ASSESSMENT — ACTIVITIES OF DAILY LIVING (ADL)
ADL_ASSISTANCE: INDEPENDENT
ADL_ASSISTANCE: INDEPENDENT
BATHING_ASSISTANCE: MODIFIED INDEPENDENT (DEVICE)

## 2024-12-05 ASSESSMENT — PAIN - FUNCTIONAL ASSESSMENT
PAIN_FUNCTIONAL_ASSESSMENT: 0-10

## 2024-12-05 ASSESSMENT — PAIN SCALES - GENERAL
PAINLEVEL_OUTOF10: 4
PAINLEVEL_OUTOF10: 0 - NO PAIN
PAINLEVEL_OUTOF10: 5 - MODERATE PAIN
PAINLEVEL_OUTOF10: 5 - MODERATE PAIN
PAINLEVEL_OUTOF10: 3
PAINLEVEL_OUTOF10: 2
PAINLEVEL_OUTOF10: 3
PAINLEVEL_OUTOF10: 2

## 2024-12-05 ASSESSMENT — PAIN SCALES - WONG BAKER: WONGBAKER_NUMERICALRESPONSE: HURTS LITTLE BIT

## 2024-12-05 NOTE — PROGRESS NOTES
Subjective     Interval History: Rody Espinosa    Patient seen on dialysis, no new complaints          Current Facility-Administered Medications:     acetaminophen (Tylenol) tablet 975 mg, 975 mg, oral, q8h, Palomo Hasnon MD, 975 mg at 12/04/24 1604    aspirin EC tablet 81 mg, 81 mg, oral, Daily, Dionte Roman MD, 81 mg at 12/04/24 0603    atorvastatin (Lipitor) tablet 40 mg, 40 mg, oral, Nightly, Dionte Roman MD, 40 mg at 12/04/24 2151    dextrose 50 % injection 12.5 g, 12.5 g, intravenous, q15 min PRN, Dionte Roman MD    dextrose 50 % injection 25 g, 25 g, intravenous, q15 min PRN, Dionte Roman MD    glucagon (Glucagen) injection 1 mg, 1 mg, intramuscular, q15 min PRN, Dionte Roman MD    glucagon (Glucagen) injection 1 mg, 1 mg, intramuscular, q15 min PRN, Dionte Roman MD    HYDROmorphone (Dilaudid) injection 0.4 mg, 0.4 mg, intravenous, q3h PRN, Rolly Smith MD, 0.4 mg at 12/04/24 1703    insulin lispro injection 0-10 Units, 0-10 Units, subcutaneous, TID AC, Dionte Roman MD, 2 Units at 12/04/24 1702    ondansetron (Zofran) injection 4 mg, 4 mg, intravenous, q8h PRN, Dionte Roman MD, 4 mg at 12/03/24 0401    oxyCODONE (Roxicodone) immediate release tablet 10 mg, 10 mg, oral, q6h PRN, Rolly Smith MD    oxyCODONE (Roxicodone) immediate release tablet 5 mg, 5 mg, oral, q4h PRN, Rolly Smith MD    pantoprazole (ProtoNix) EC tablet 40 mg, 40 mg, oral, Daily before breakfast, Dionte Roman MD, 40 mg at 12/04/24 0603    piperacillin-tazobactam (Zosyn) 2.25 g in dextrose (iso) IV 50 mL, 2.25 g, intravenous, q8h, Dionte Roman MD, Stopped at 12/04/24 1748    [Held by provider] polyethylene glycol (Glycolax, Miralax) packet 17 g, 17 g, oral, Daily, Palomo Hanson MD    pregabalin (Lyrica) capsule 100 mg, 100 mg, oral, Nightly, Dionte Roman MD, 100 mg at 12/04/24 3676    sevelamer carbonate (Renvela) tablet 2,400 mg, 2,400 mg, oral, TID, Dionte  MD Jessie, 2,400 mg at 12/04/24 1647    timolol (Timoptic) 0.5 % ophthalmic solution 1 drop, 1 drop, ophthalmic (eye), BID, Dionte Roman MD, 1 drop at 12/04/24 0831    vancomycin (Vancocin) 750 mg in dextrose 5%  mL, 750 mg, intravenous, Once per day on Monday Wednesday Friday, Dionte Roman MD, Stopped at 12/04/24 1748    vancomycin (Vancocin) pharmacy to dose - pharmacy monitoring, , miscellaneous, Daily PRN, Dionte Roman MD    vitamin B complex-vitamin C-folic acid (Nephrocaps) capsule 1 capsule, 1 capsule, oral, Daily, Palomo Hanson MD, 1 capsule at 12/04/24 0833    white petrolatum (Aquaphor) ointment, , Topical, q1h PRN, Sin Kovacs MD    Physical Exam  Physical Exam  Heart S1 S2 RRR, Lungs CTA, no edema      Vital signs in last 24 hours:  Temp:  [35.1 °C (95.2 °F)-36.6 °C (97.9 °F)] 36.4 °C (97.5 °F)  Heart Rate:  [] 93  Resp:  [14-18] 18  BP: (111-139)/(57-85) 119/58         Labs:  Results from last 7 days   Lab Units 12/04/24  0554   WBC AUTO x10*3/uL 8.0   RBC AUTO x10*6/uL 3.43*   HEMOGLOBIN g/dL 10.8*   HEMATOCRIT % 29.8*     Results from last 7 days   Lab Units 12/04/24  0554 11/30/24  0611 11/29/24  1843   SODIUM mmol/L 138   < > 137   POTASSIUM mmol/L 5.2   < > 4.4   CHLORIDE mmol/L 96*   < > 93*   CO2 mmol/L 27   < > 32   BUN mg/dL 43*   < > 20   CREATININE mg/dL 12.64*   < > 7.77*   CALCIUM mg/dL 9.7   < > 9.1   PHOSPHORUS mg/dL 8.3*   < >  --    MAGNESIUM mg/dL 2.49*   < >  --    BILIRUBIN TOTAL mg/dL  --   --  0.6   ALT U/L  --   --  8   AST U/L  --   --  7*    < > = values in this interval not displayed.            Assessment/Plan   Patient seen and examined while on dialysis, recent events, labs, medications reviewed. Will follow overall management per primary team, and continue regular dialysis.    Principal Problem:    Osteomyelitis of right foot (Multi)  Active Problems:    Peripheral vascular disease (CMS-HCC)    Hemodialysis-associated hypotension    Coronary  artery disease involving native coronary artery of native heart without angina pectoris    ESRD (end stage renal disease) on dialysis (Multi)    Controlled type 2 diabetes mellitus with diabetic polyneuropathy, without long-term current use of insulin    Osteomyelitis    Diabetic ulcer of toe associated with diabetes mellitus due to underlying condition, limited to breakdown of skin        Terry Smith MD  12/4/2024  11:05 PM

## 2024-12-05 NOTE — PROGRESS NOTES
Transitional Care Coordination Progress Note:  Patient discussed during interdisciplinary rounds.   Team members present: MD, TCC  Plan per Medical/Surgical team: Osteomyelitis of right foot  Payor: Liborio King  Discharge disposition: transfer to [facility]  Potential Barriers: none  ADOD: 1-2 days  Per MD, patient accepted as a transfer to [facility], pending bed availability. Will continue to monitor for discharge planning needs.     Opal YOUSIF, RN  Transitional Care Coordinator (TCC)  [phone]

## 2024-12-05 NOTE — PROGRESS NOTES
Per chart review pt noted to be admitted to the hospital for osteomyelitis, made status 7 in UNET and EPIC at this time.    Letter sent to pt and her dialysis unit notifying her of the above.

## 2024-12-05 NOTE — PROGRESS NOTES
"Rody Espinosa is a 53 y.o. female on day 2 of admission presenting with Osteomyelitis of right foot (Multi).    Subjective   Pt resting in bed. Denies sob, n/v/d, fever, cough, chills, pain, chest pains, light head, dizziness, constipation        Objective     Physical Exam  Vitals and nursing note reviewed.   Cardiovascular:      Rate and Rhythm: Normal rate and regular rhythm.   Pulmonary:      Comments: Elie lung sounds dim  Abdominal:      General: There is distension.      Comments: Slight dist that is non-tender to palpation   Genitourinary:     Comments: States anuric  Musculoskeletal:         General: Normal range of motion.      Comments: Ble without edema  Rt arm edema-no pain   Skin:     General: Skin is warm.      Comments: Rt foot 2nd, 3rd toe necrotic, malodorous   Neurological:      Mental Status: She is alert and oriented to person, place, and time.   Psychiatric:         Mood and Affect: Mood normal.         Behavior: Behavior normal.         Last Recorded Vitals  Blood pressure 135/78, pulse 96, temperature 36.6 °C (97.9 °F), resp. rate 18, height 1.702 m (5' 7.01\"), weight 84.5 kg (186 lb 4.6 oz), SpO2 97%.  Intake/Output last 3 Shifts:  I/O last 3 completed shifts:  In: 1500 (17.8 mL/kg) [I.V.:700 (8.3 mL/kg); Other:400; IV Piggyback:400]  Out: 871 (10.3 mL/kg) [Other:871]  Weight: 84.5 kg     Relevant Results    Scheduled medications  acetaminophen, 975 mg, oral, q8h  aspirin, 81 mg, oral, Daily  atorvastatin, 40 mg, oral, Nightly  insulin lispro, 0-10 Units, subcutaneous, TID AC  pantoprazole, 40 mg, oral, Daily before breakfast  piperacillin-tazobactam, 2.25 g, intravenous, q8h  [Held by provider] polyethylene glycol, 17 g, oral, Daily  pregabalin, 100 mg, oral, Nightly  sevelamer carbonate, 2,400 mg, oral, TID  timolol, 1 drop, ophthalmic (eye), BID  vancomycin, 750 mg, intravenous, Once per day on Monday Wednesday Friday  vitamin B complex-vitamin C-folic acid, 1 capsule, oral, " Daily      Continuous medications     PRN medications  PRN medications: dextrose, dextrose, glucagon, glucagon, HYDROmorphone, ondansetron, oxyCODONE, oxyCODONE, vancomycin, white petrolatum   Results for orders placed or performed during the hospital encounter of 12/03/24 (from the past 24 hours)   POCT GLUCOSE   Result Value Ref Range    POCT Glucose 155 (H) 74 - 99 mg/dL   POCT GLUCOSE   Result Value Ref Range    POCT Glucose 210 (H) 74 - 99 mg/dL   CBC and Auto Differential   Result Value Ref Range    WBC 7.8 4.4 - 11.3 x10*3/uL    nRBC 0.0 0.0 - 0.0 /100 WBCs    RBC 3.23 (L) 4.00 - 5.20 x10*6/uL    Hemoglobin 10.1 (L) 12.0 - 16.0 g/dL    Hematocrit 29.0 (L) 36.0 - 46.0 %    MCV 90 80 - 100 fL    MCH 31.3 26.0 - 34.0 pg    MCHC 34.8 32.0 - 36.0 g/dL    RDW 14.2 11.5 - 14.5 %    Platelets 172 150 - 450 x10*3/uL    Neutrophils % 50.4 40.0 - 80.0 %    Immature Granulocytes %, Automated 0.3 0.0 - 0.9 %    Lymphocytes % 19.0 13.0 - 44.0 %    Monocytes % 11.8 2.0 - 10.0 %    Eosinophils % 17.5 0.0 - 6.0 %    Basophils % 1.0 0.0 - 2.0 %    Neutrophils Absolute 3.93 1.20 - 7.70 x10*3/uL    Immature Granulocytes Absolute, Automated 0.02 0.00 - 0.70 x10*3/uL    Lymphocytes Absolute 1.48 1.20 - 4.80 x10*3/uL    Monocytes Absolute 0.92 0.10 - 1.00 x10*3/uL    Eosinophils Absolute 1.36 (H) 0.00 - 0.70 x10*3/uL    Basophils Absolute 0.08 0.00 - 0.10 x10*3/uL   Renal function panel   Result Value Ref Range    Glucose 95 74 - 99 mg/dL    Sodium 141 136 - 145 mmol/L    Potassium 4.2 3.5 - 5.3 mmol/L    Chloride 99 98 - 107 mmol/L    Bicarbonate 29 21 - 32 mmol/L    Anion Gap 17 10 - 20 mmol/L    Urea Nitrogen 18 6 - 23 mg/dL    Creatinine 7.48 (H) 0.50 - 1.05 mg/dL    eGFR 6 (L) >60 mL/min/1.73m*2    Calcium 9.8 8.6 - 10.6 mg/dL    Phosphorus 5.5 (H) 2.5 - 4.9 mg/dL    Albumin 3.6 3.4 - 5.0 g/dL   Magnesium   Result Value Ref Range    Magnesium 2.42 (H) 1.60 - 2.40 mg/dL   POCT GLUCOSE   Result Value Ref Range    POCT Glucose  107 (H) 74 - 99 mg/dL   POCT GLUCOSE   Result Value Ref Range    POCT Glucose 149 (H) 74 - 99 mg/dL                     Assessment/Plan   Assessment & Plan  Osteomyelitis of right foot (Multi)    Peripheral vascular disease (CMS-HCC)    Hemodialysis-associated hypotension    Coronary artery disease involving native coronary artery of native heart without angina pectoris    ESRD (end stage renal disease) on dialysis (Multi)    Controlled type 2 diabetes mellitus with diabetic polyneuropathy, without long-term current use of insulin    Osteomyelitis    Diabetic ulcer of toe associated with diabetes mellitus due to underlying condition, limited to breakdown of skin    Peripheral vascular disease, unspecified (CMS-[facility])    Tolerated hemodialysis yesterday with net fluid loss 471cc    Bp stable with tx, euvolemic on exam and has stable electrolytes . K+=4.2     Outpatient Dialysis schedule:  [facility] D[facility]/Dr Vega      Access: rt graft- no issues - able to achieve      Anemia of ESRD:   not on WILLARD.. current hgb 10.1.. will cont to monitor     CKD-MBD Phosphate Binder:sevelamer carbonate (Renvela) tablet 2,400 mg  tid ac, vitamin B complex-vitamin C-folic acid (Nephrocaps) capsule 1 capsule daily     Plan HD tomorrow with UF as tolerated     Renal diet      Please obtain daily standing wt (if possible)     Medication to be adjusted for ESRD      Patient to continue regular HD schedule while inpatient and to follow with the outpatient nephrologist at discharge              REJI Willams-CNP

## 2024-12-05 NOTE — ANESTHESIA PREPROCEDURE EVALUATION
Patient: Rody Espinosa    Procedure Information       Date/Time: 12/06/24 1100    Procedure: Angiogram Lower Extremity (Right)    Location: [facility] OR 16 / Virtual [facility] OR    Surgeons: Yaz Liao MD            Relevant Problems   Cardiac   (+) Benign essential hypertension   (+) Coronary artery disease involving native coronary artery of native heart without angina pectoris   (+) Essential hypertension   (+) Hyperlipidemia   (+) MI (myocardial infarction) (Multi)   (+) Peripheral vascular disease (CMS-HCC)   (+) Peripheral vascular disease, unspecified (CMS-[facility])      Neuro   (+) Controlled type 2 diabetes mellitus with diabetic polyneuropathy, without long-term current use of insulin   (+) Depression      GI   (+) Gastroesophageal reflux disease without esophagitis      /Renal   (+) ESRD (end stage renal disease) (Multi)   (+) ESRD (end stage renal disease) on dialysis (Multi)      Endocrine   (+) Controlled type 2 diabetes mellitus with diabetic polyneuropathy, without long-term current use of insulin   (+) Hyperparathyroidism (Multi)   (+) Proliferative diabetic retinopathy of both eyes without macular edema associated with type 2 diabetes mellitus      ID   (+) Osteomyelitis   (+) Osteomyelitis of right foot (Multi)      GYN   (+) Endometriosis        Clinical information reviewed:    Allergies  Meds               NPO Detail:  No data recorded     PHYSICAL EXAM    Patient: Rody Espinosa    Procedure Information       Date/Time: 12/06/24 1100    Procedure: Angiogram Lower Extremity (Right)    Location: [facility] OR 16 / Virtual [facility] OR    Surgeons: Yaz Liao MD            [unfilled]             Past Medical History:   Diagnosis Date    Allergic purpura (CMS-[facility])     HSP (Henoch-Schonlein purpura) nephritis    Benign neoplasm of connective and other soft tissue, unspecified     Fibroid tumor    Compression of vein 11/16/2022    SVC syndrome    End stage renal disease (Multi)      ESRD (end stage renal disease)    Other conditions influencing health status     Acute Myocardial Infarction    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      Past Surgical History:   Procedure Laterality Date    HYSTEROSCOPY  05/15/2013    Hysteroscopy With Endometrial Ablation    OTHER SURGICAL HISTORY  05/15/2013    Renal Transplant    OTHER SURGICAL HISTORY  05/15/2013    Parathyroid Resection Sub-Total Parathyroidectomy    OTHER SURGICAL HISTORY  05/15/2013    Parathyroid Surgery    OTHER SURGICAL HISTORY  05/15/2013    Repair Of Brow Ptosis Right Upper Eyelid    OTHER SURGICAL HISTORY  05/15/2013    Cardiac Cath Lesion 1, 1st Adjunct Treat Device: Stent    OTHER SURGICAL HISTORY  05/15/2013    Biopsy Renal    OTHER SURGICAL HISTORY  10/25/2018    Hemodialysis Access Type Arteriovenous Graft Right Thigh    OTHER SURGICAL HISTORY  10/25/2018    Hemodialysis Access Type Arteriovenous Fistula Left Arm    OTHER SURGICAL HISTORY  10/22/2014    Percutaneous Transluminal Angioplasty (Non-Coronary)     Social History     Tobacco Use    Smoking status: Never    Smokeless tobacco: Never   Substance Use Topics    Alcohol use: Yes     Comment: occasional    Drug use: Never      Current Outpatient Medications   Medication Instructions    acetaminophen (TYLENOL) 650 mg, oral, Every 4 hours PRN    aspirin 81 mg, oral, Daily RT    atorvastatin (LIPITOR) 40 mg, oral, Nightly    cetirizine (ZYRTEC) 5 mg, oral, Nightly    clobetasol (Temovate) 0.05 % ointment 1 Application, Topical, 2 times daily PRN    glipiZIDE (GLUCOTROL) 5 mg, oral, 2 times daily    ketoconazole (NIZOral) 2 % shampoo 1 Application, Topical, Every 14 days    metoprolol succinate XL (TOPROL-XL) 25 mg, oral, Daily    Nephron FA 66 mg iron- 1,000 mcg tablet 1 tablet, oral, Daily    omeprazole (PRILOSEC) 40 mg, oral, 2 times daily    ondansetron (Zofran) 4 mg tablet oral, Every 8 hours PRN    polyethylene glycol (GLYCOLAX, MIRALAX) 17 g,  "oral, Daily PRN    pregabalin (Lyrica) 25 mg capsule 1-4 capsules, oral, Nightly PRN    sevelamer carbonate (Renvela) 800 mg tablet 3 tablets, oral, 3 times daily (morning, midday, late afternoon)    timolol (Timoptic) 0.5 % ophthalmic solution 1 drop, Left Eye, 2 times daily    triamcinolone (Kenalog) 0.1 % ointment 1 Application, Topical, Every 48 hours PRN, To scalp      Allergies   Allergen Reactions    Betadine [Povidone-Iodine] Unknown    Iodinated Contrast Media Itching    Adhesive Tape-Silicones Rash    Iodine Rash     Mild rash        Chemistry    Lab Results   Component Value Date/Time     12/05/2024 0559    K 4.2 12/05/2024 0559    CL 99 12/05/2024 0559    CO2 29 12/05/2024 0559    BUN 18 12/05/2024 0559    CREATININE 7.48 (H) 12/05/2024 0559    Lab Results   Component Value Date/Time    CALCIUM 9.8 12/05/2024 0559    ALKPHOS 66 11/29/2024 1843    AST 7 (L) 11/29/2024 1843    ALT 8 11/29/2024 1843    BILITOT 0.6 11/29/2024 1843          Lab Results   Component Value Date/Time    WBC 7.8 12/05/2024 0559    HGB 10.1 (L) 12/05/2024 0559    HCT 29.0 (L) 12/05/2024 0559     12/05/2024 0559     Lab Results   Component Value Date/Time    PROTIME 12.8 12/04/2024 0554    INR 1.1 12/04/2024 0554            Visit Vitals  /78   Pulse 96   Temp 36.6 °C (97.9 °F)   Resp 18   Ht 1.702 m (5' 7.01\")   Wt 84.5 kg (186 lb 4.6 oz)   SpO2 97%   BMI 29.17 kg/m²   OB Status Hysterectomy   Smoking Status Never   BSA 2 m²       Anesthesia Evaluation     Physical Exam     Anesthesia Plan   "

## 2024-12-05 NOTE — PROGRESS NOTES
VASCULAR SURGERY PROGRESS NOTE  Assessment/Plan   Rody Espinosa is 53 y.o. female with history ofhistory of ESRD (failed transplant in 2021, multiple previous access sites on LUE and currently RLE), HTN, HLD, CAD (MI in 2006, PCI with DESx2), DM, HOLLY who presents as a transfer from [facility] for R 2nd toe gangrene.    PVR of RLE obtained suggesting arterial disease with monophasic flow    Plan:  Duplex ultrasound of the AVF to assess flow / for steal; repeat ABIs  Agree with podiatry consult, MRI showing possible OM of R 5th digit, unlikely in 2nd (pending final read)  Continue antibiotics  Will plan on completing RLE angiogram tomorrow 12/6 with Dr. Liao - please keep NPO at midnight, ensure T&S up to date     D/w attending, Dr. Keshawn Bland MD  General Surgery PGY-3  Vascular Surgery [id number]    Subjective   No issues. No worsening pain in RLE, just ongoing pain in toes. Had pain in both legs with dialysis yesterday.     Objective   Vitals:  Heart Rate:  []   Temp:  [35.1 °C (95.2 °F)-36.6 °C (97.9 °F)]   Resp:  [14-18]   BP: (111-139)/(57-85)   SpO2:  [93 %-100 %]     Exam:  Constitutional: No acute distress, resting comfortably  Neuro:  AOx3, grossly intact  ENMT: moist mucous membranes  Head/neck: atraumatic  CV: no tachycardia  Pulm: non-labored on room air  GI: soft, non-tender, non-distended  Skin: warm and dry  Musculoskeletal: moving all extremities  Extremities: L femoral, DP palpable. R fistula with palpable thrill. R 5th and 3rd toes dusky, R 2nd toe necrotic with drainage    Labs:  Results from last 7 days   Lab Units 12/05/24  0559 12/04/24  0554 12/03/24  0629   WBC AUTO x10*3/uL 7.8 8.0 8.3   HEMOGLOBIN g/dL 10.1* 10.8* 10.2*   PLATELETS AUTO x10*3/uL 172 196 174      Results from last 7 days   Lab Units 12/05/24  0559 12/04/24  0554 12/03/24  0629   SODIUM mmol/L 141 138 137   POTASSIUM mmol/L 4.2 5.2 4.8   CHLORIDE mmol/L 99 96* 94*   CO2 mmol/L 29 27 31   BUN mg/dL 18 43*  21   CREATININE mg/dL 7.48* 12.64* 9.34*   GLUCOSE mg/dL 95 124* 115*   MAGNESIUM mg/dL 2.42* 2.49* 1.88   PHOSPHORUS mg/dL 5.5* 8.3* 6.6*      Results from last 7 days   Lab Units 12/04/24  0554 12/03/24  0629   INR  1.1 1.1   PROTIME seconds 12.8 12.8   APTT seconds 36 36

## 2024-12-05 NOTE — PROGRESS NOTES
Occupational Therapy    Evaluation    Patient Name: Rody Hernandez  MRN: [id number]  Today's Date: 12/5/2024  Room: [address] [place] [address]  Time Calculation  Start Time: 0943  Stop Time: 1013  Time Calculation (min): 30 min    Assessment  IP OT Assessment  OT Assessment: RODY HERNANDEZ EXHIBITS PERFORMANCE OF HER ADL, MOBILITY, AND TF TASKS AT A IND > MOD I LEVEL AND DOES NOT REQUIRE IP OT AT THIS TIME. IDT TO RECONSULT FOR OT AS NEEDED  Evaluation/Treatment Tolerance: Patient tolerated treatment well  Medical Staff Made Aware: Yes  End of Session Communication: Bedside nurse, PCT/NA/CTA  End of Session Patient Position: Bed, 2 rail up, Alarm off, not on at start of session  Plan:  Inpatient Plan  No Skilled OT: No acute OT goals identified  OT Discharge Recommendations:  (HOME SAFETY EVALUATION FOR DME AND AE OPTIMAL RECOMMENDATIONS IN HER POST DISCHARGE SETTING)  Equipment Recommended upon Discharge:  (TUB BENCH, LH AE, ETS TBD IN HOME/NEXT LEVEL OF CARE SETTING)  OT Recommended Transfer Status:  (MOD I)  OT - OK to Discharge: Yes  OT Assessment  Evaluation/Treatment Tolerance: Patient tolerated treatment well  Medical Staff Made Aware: Yes    Subjective   Current Problem:  1. Osteomyelitis        2. Osteomyelitis of right foot, unspecified type (Multi)  Vascular US Ankle Brachial Index (DHARMESH) Without Exercise    Vascular US Ankle Brachial Index (DHARMESH) Without Exercise    CANCELED: Vascular US lower extremity arterial duplex right    CANCELED: Vascular US lower extremity arterial duplex right      3. Gangrene, not elsewhere classified  Vascular US Ankle Brachial Index (DHARMESH) Without Exercise    Vascular US Ankle Brachial Index (DHARMESH) Without Exercise    CANCELED: Vascular US lower extremity arterial duplex right    CANCELED: Vascular US lower extremity arterial duplex right    CANCELED: Vascular US lower extremity arterial duplex graft bilateral with DHARMESH    CANCELED: Vascular US lower extremity arterial duplex graft bilateral  with DHARMESH      4. Peripheral vascular disease, unspecified (CMS-HCC)  Vascular US Ankle Brachial Index (DHARMESH) Without Exercise    Vascular US Lower Extremity Hemodialysis Access Duplex Right    Vascular US Lower Extremity Hemodialysis Access Duplex Right    Vascular US ankle brachial index (DHARMESH) without exercise    Vascular US ankle brachial index (DHARMESH) without exercise    Case Request Operating Room: Angiogram Lower Extremity    Case Request Operating Room: Angiogram Lower Extremity    CANCELED: Vascular US lower extremity arterial duplex right    CANCELED: Vascular US lower extremity arterial duplex right    CANCELED: Vascular US lower extremity arterial duplex with graft right with DHARMESH    CANCELED: Vascular US lower extremity arterial duplex with graft right with DHARMESH      5. End stage renal disease (Multi)  Vascular US Lower Extremity Hemodialysis Access Duplex Right      6. Other specified symptoms and signs involving the circulatory and respiratory systems          General:  Reason for Referral: Presents as a transfer from [facility] for R 2nd toe gangrene.  Past Medical History Relevant to Rehab: ESRD (failed transplant in 2021, multiple previous access sites on LUE and currently RLE), HTN, HLD, CAD (MI in 2006, PCI with DESx2), DM, HOLLY  Prior to Session Communication: Bedside nurse  Patient Position Received: Bed, 2 rail up, Alarm off, not on at start of session  Family/Caregiver Present: No  General Comment: UPON APPROACH PATIENT WAS SITTING AT THE SIDE OF HER BED COMPLETING HER MEAL. SHE WAS PLEASANT AND RECEPTIVE TO FULL PARTICIPATION.   Precautions:  Hearing/Visual Limitations: WFL/ IMP R/EYE PTOSIS; L/EYE LEGALLY BLIND; WEARS READING GLASSES  LE Weight Bearing Status: Weight Bearing as Tolerated (RLE)  Medical Precautions: Fall precautions  Precautions Comment: WBAT AS TOLERATED PER PODIATRY  Vital Signs:  Vital Signs (Past 2hrs)           Pain:  Pain Assessment  Pain Assessment: 0-10  0-10 (Numeric) Pain  Score: 2 (B/FEET)  Objective   Cognition:  Overall Cognitive Status: Within Functional Limits  Orientation Level: Oriented X4  Home Living:  Type of Home: House  Lives With: Spouse (WHO IS CURRENTLY IN A [facility] HOME; HER NIECE/NIECE'S /AND CHILDREN ARE CURRENTLY RESIDING WITH HER AND PROVIDING TEMPORARY IADL ASSISTANCE.)  Home Layout: Two level, Full bath main level, Laundry in basement, Stairs to alternate level with rails  Alternate Level Stairs-Rails: Left  Alternate Level Stairs-Number of Steps: 12  Home Access: Stairs to enter with rails  Entrance Stairs-Rails: Left  Entrance Stairs-Number of Steps: 2  Bathroom Shower/Tub: Tub/shower unit  Bathroom Toilet: Standard  Bathroom Equipment: Grab bars in shower   Prior Function:  Level of [place]: Independent with ADLs and functional transfers  Receives Help From: Family  ADL Assistance: Independent  Homemaking Assistance: Needs assistance  Meal Prep: Minimal  Laundry: Minimal  Vacuuming: Minimal  Cleaning: Minimal  Driving/Transportation:  (+DRIVING PTA)  Shopping: Minimal  Vocational:  (FORMER )  Hand Dominance: Right  IADL History:  Homemaking Responsibilities: Yes  Meal Prep Responsibility: Secondary  Laundry Responsibility: Secondary  Cleaning Responsibility: Secondary  Bill Paying/Finance Responsibility: Primary  Shopping Responsibility: Secondary  Leisure and Hobbies: MOVIES, TV, MUSIC, CASINO  IADL Comments: REPORTS FAMILY ASSISTS WITH IADLS; ALSO REPORTS PLANS TO BECOME THE PRIMARY CAREGIVER FOR HER  ONCE HE'S DISCHARGED FROM THE [facility] HOME  ADL:  Eating Assistance: Independent  Grooming Assistance: Modified independent (Device)  Bathing Assistance: Modified independent (Device)  Bathing Deficit:  (ANTICIPATED)  UE Dressing Assistance: Independent  LE Dressing Assistance: Independent  Toileting Assistance with Device: Independent  Activity Tolerance:  Endurance: Endurance does not limit participation in activity  Balance:  Dynamic  Sitting Balance  Dynamic Sitting-Balance Support: No upper extremity supported, Feet supported  Dynamic Sitting-Level of Assistance: Independent  Static Sitting Balance  Static Sitting-Balance Support: No upper extremity supported, Feet supported  Static Sitting-Level of Assistance: Independent  Bed Mobility/Transfers: Bed Mobility  Bed Mobility: Yes  Functional Mobility  Functional Mobility Performed: Yes  Functional Mobility 1  Surface 1: Level tile  Device 1: No device  Assistance 1:  (MOD I)   and Transfers  Transfer: Yes  Transfer 1  Transfer From 1:  (IND SIT < > STAND FROM EOB)  Transfers 2  Transfer From 2:  (MOD I FOR TOILET TF)  IADL's:   Homemaking Responsibilities: Yes  Meal Prep Responsibility: Secondary  Laundry Responsibility: Secondary  Cleaning Responsibility: Secondary  Bill Paying/Finance Responsibility: Primary  Shopping Responsibility: Secondary  Leisure and Hobbies: MOVIES, TV, MUSIC, CASINO  IADL Comments: REPORTS FAMILY ASSISTS WITH IADLS; ALSO REPORTS PLANS TO BECOME THE PRIMARY CAREGIVER FOR HER  ONCE HE'S DISCHARGED FROM THE [facility] HOME  Vision: Vision - Basic Assessment  Current Vision: Wears glasses only for reading  Visual History: Surgical intervention (B/LENS TRANSPLANT, R/EYE PTOSIS, L/EYE BLINDNESS)  Patient Visual Report: Blurring of print when reading  Sensation:  Light Touch:  (REPORTS NUMBNESS IN R/DIGITS 3-5 R/T CHRONIC EDEMA AND B/FEET)  Strength:  Strength Comments: BUE FUNCTIONALLY >/= 4- /5  Hand Function:  Hand Function  Gross Grasp: Functional  Coordination: Functional  Extremities: RUE   RUE : Within Functional Limits (DESPITE INCREASED EDEMA), LUE   LUE: Within Functional Limits,  , and      Outcome Measures: [facility] Daily Activity  Putting on and taking off regular lower body clothing: None  Bathing (including washing, rinsing, drying): None  Putting on and taking off regular upper body clothing: None  Toileting, which includes using toilet, bedpan or urinal:  None  Taking care of personal grooming such as brushing teeth: None  Eating Meals: None  Daily Activity - Total Score: 24  Brief Confusion Assessment Method (bCAM)  CAM Result: CAM -      ,     OT Adult Other Outcome Measures  4AT: 0     12/05/24 at 12:08 PM   Mary Ann Dawson OT   Rehab Office: [phone]

## 2024-12-05 NOTE — CARE PLAN
The clinical goals for the shift include Pt will remain safe and free from falls    Problem: Pain - Adult  Goal: Verbalizes/displays adequate comfort level or baseline comfort level  Outcome: Progressing     Problem: Safety - Adult  Goal: Free from fall injury  Outcome: Progressing     Problem: Discharge Planning  Goal: Discharge to home or other facility with appropriate resources  Outcome: Progressing     Problem: Chronic Conditions and Co-morbidities  Goal: Patient's chronic conditions and co-morbidity symptoms are monitored and maintained or improved  Outcome: Progressing     Problem: Skin  Goal: Decreased wound size/increased tissue granulation at next dressing change  Outcome: Progressing  Goal: Participates in plan/prevention/treatment measures  Outcome: Progressing  Goal: Prevent/manage excess moisture  Outcome: Progressing  Goal: Prevent/minimize sheer/friction injuries  Outcome: Progressing  Goal: Promote/optimize nutrition  Outcome: Progressing  Goal: Promote skin healing  Outcome: Progressing

## 2024-12-05 NOTE — PROGRESS NOTES
Physical Therapy    Physical Therapy Evaluation    Patient Name: Rody Espinosa  MRN: [id number]  Department: [place] [id number]  Room: [address] [place] [address]  Today's Date: 12/5/2024   Time Calculation  Start Time: 1057  Stop Time: 1107  Time Calculation (min): 10 min    Assessment/Plan   PT Assessment  PT Assessment Results: Decreased strength, Decreased endurance, Impaired balance, Decreased mobility  Rehab Prognosis: Good  End of Session Communication: Bedside nurse  End of Session Patient Position: Alarm off, not on at start of session (seated EOB)  IP OR SWING BED PT PLAN  Inpatient or Swing Bed: Inpatient  PT Plan  Treatment/Interventions: Transfer training, Gait training, Stair training, Balance training, Strengthening, Endurance training  PT Plan: Ongoing PT  PT Frequency: 2 times per week  PT Discharge Recommendations: No PT needed after discharge  PT Recommended Transfer Status: Assist x1  PT - OK to Discharge: Yes    Subjective   General Visit Information:  General  Reason for Referral: Transferred from [facility] for vascular surgery evaluation iso of right second toe gangrene concerning for OM.  Past Medical History Relevant to Rehab: ESRD (failed transplant in 2021, multiple previous access sites on LUE and currently RLE), HTN, HLD, CAD (MI in 2006, PCI with DESx2), DM, HOLLY  Prior to Session Communication: Bedside nurse  Patient Position Received: Alarm off, not on at start of session (seated EOB)  Preferred Learning Style: verbal  General Comment: Pt seated EOB upon arrival and willing to particiapte in PT session. R hand swelling, pt reports baseline for many years  Home Living:  Home Living  Type of Home: House  Lives With:  (spouse (Spouse currently at [facility] home), eun and eun's  and 2 kids 4 and 15 y/o)  Home Adaptive Equipment: None  Home Layout: Full bath main level, Two level  Alternate Level Stairs-Rails: Left  Alternate Level Stairs-Number of Steps: 12 (1 handrail)  Home Access: Stairs to enter with  rails  Entrance Stairs-Rails: Left  Entrance Stairs-Number of Steps: 2 (1 handrail)  Bathroom Shower/Tub: Tub/shower unit  Prior Level of Function:  Prior Function Per Pt/Caregiver Report  Level of [place]: Independent with ADLs and functional transfers  ADL Assistance: Independent  Ambulatory Assistance: Independent  Hand Dominance: Right  Prior Function Comments: +drives, denies falls  Precautions:  Precautions  Hearing/Visual Limitations: R eye ptsois; L eye legally blind, reading glasses  LE Weight Bearing Status: Weight Bearing as Tolerated (RLE)  Medical Precautions: Fall precautions  Precautions Comment: Per Podiatry, patient is WBAT on R foot.    Objective   Pain:  Pain Assessment  Pain Assessment: 0-10  0-10 (Numeric) Pain Score: 2  Pain Location: Foot  Pain Orientation: Right  Cognition:  Cognition  Overall Cognitive Status: Within Functional Limits  Arousal/Alertness: Appropriate responses to stimuli  Orientation Level: Oriented X4    General Assessments:        Sensation  Light Touch:  (reports baseline numbness R middle 3 fingers and B toes)    Strength  Strength Comments: BLE's 4/5 based on mobility    Static Sitting Balance  Static Sitting-Level of Assistance: Modified independent  Dynamic Sitting Balance  Dynamic Sitting-Level of Assistance: Modified independent    Static Standing Balance  Static Standing-Level of Assistance:  (supervision)  Dynamic Standing Balance  Dynamic Standing-Level of Assistance: Close supervision  Functional Assessments:  Bed Mobility  Bed Mobility: Yes  Bed Mobility 1  Bed Mobility 1: Supine to sitting, Sitting to supine  Level of Assistance 1: Modified independent  Bed Mobility Comments 1: HOB slightly elevated    Transfers  Transfer: Yes  Transfer 1  Transfer From 1: Sit to, Stand to  Transfer to 1: Sit, Stand  Technique 1: Sit to stand, Stand to sit  Transfer Level of Assistance 1: Close supervision, Minimal verbal cues  Trials/Comments 1: cues for hand placement  for controlled descend, uncontrolled descend noted    Ambulation/Gait Training  Ambulation/Gait Training Performed: Yes  Ambulation/Gait Training 1  Surface 1: Level tile  Device 1: No device  Assistance 1: Close supervision, Minimal verbal cues  Quality of Gait 1: Decreased step length  Comments/Distance (ft) 1: 20 ft and 15 ft (cues for WBAT RLE, mild unsetadiness with lateral sway, but no LOB, noted decreased WB RLE, declined to use walker)    Stairs  Stairs: No (pt declined, educated on having assist upon D/C)       Outcome Measures:  [facility] Basic Mobility  Turning from your back to your side while in a flat bed without using bedrails: None  Moving from lying on your back to sitting on the side of a flat bed without using bedrails: None  Moving to and from bed to chair (including a wheelchair): A little  Standing up from a chair using your arms (e.g. wheelchair or bedside chair): A little  To walk in hospital room: A little  Climbing 3-5 steps with railing: A lot  Basic Mobility - Total Score: 19    Encounter Problems       Encounter Problems (Active)       Mobility       Pt will be Shantanu ambulation 150 ft with LRAD (Progressing)       Start:  12/05/24    Expected End:  12/19/24            Pt will be Shantanu ascend/descend 12 steps with 1 handrail (Progressing)       Start:  12/05/24    Expected End:  12/19/24               PT Transfers       Pt will be independent with sit to stand and bed to chair transfers (Progressing)       Start:  12/05/24    Expected End:  12/20/24               Pain - Adult              Education Documentation  Mobility Training, taught by Lori Alex PT at 12/5/2024 12:42 PM.  Learner: Patient  Readiness: Acceptance  Method: Explanation  Response: Verbalizes Understanding, Needs Reinforcement    Precautions, taught by Lori Alex PT at 12/5/2024 12:42 PM.  Learner: Patient  Readiness: Acceptance  Method: Explanation  Response: Verbalizes Understanding, Needs Reinforcement    Education  Comments  No comments found.

## 2024-12-05 NOTE — PROGRESS NOTES
"Rody Espinosa is a 53 y.o. female on day 2 of admission presenting with Osteomyelitis of right foot (Multi).    Subjective   Patient was seen at bedside, resting comfortably. She denies any constitutional symptoms or other pedal complaints today.       Objective     Physical Exam:   General: AAOx3, no acute distress     Vasc: Dorsalis pedis and posterior tibial pulses are non-palpable bilateral.  Capillary refill time is delayed to the hallux bilateral. Skin temperature is warm to warm from proximal tibia to distal digits bilateral. There is focal edema in the right forefoot. There is dry gangrene and necrosis of the right second toe, with dusky discoloration of the third and fifth right toes.       Neuro: Light touch sensation is diminished to the foot bilateral.       Derm: Skin is supple with normal texture and turgor noted. Nails 1-5 bilateral are dystrophic. Webspaces 1-4 are clean, dry and intact bilateral. There is dry gangrene and necrosis of the right second toe, with dusky discoloration of the third and fifth right toes.  No active drainage. No purulence. No crepitus. No fluctuance. There is malodor.       Ortho: Muscle strength is +5/5 for all four pedal muscle groups bilateral. There are no structural deformities noted bilateral.    Last Recorded Vitals  Blood pressure 135/78, pulse 96, temperature 36.6 °C (97.9 °F), resp. rate 18, height 1.702 m (5' 7.01\"), weight 84.5 kg (186 lb 4.6 oz), SpO2 97%.    Relevant Results      Results for orders placed or performed during the hospital encounter of 12/03/24 (from the past 24 hours)   POCT GLUCOSE   Result Value Ref Range    POCT Glucose 155 (H) 74 - 99 mg/dL   POCT GLUCOSE   Result Value Ref Range    POCT Glucose 210 (H) 74 - 99 mg/dL   CBC and Auto Differential   Result Value Ref Range    WBC 7.8 4.4 - 11.3 x10*3/uL    nRBC 0.0 0.0 - 0.0 /100 WBCs    RBC 3.23 (L) 4.00 - 5.20 x10*6/uL    Hemoglobin 10.1 (L) 12.0 - 16.0 g/dL    Hematocrit 29.0 (L) 36.0 - " 46.0 %    MCV 90 80 - 100 fL    MCH 31.3 26.0 - 34.0 pg    MCHC 34.8 32.0 - 36.0 g/dL    RDW 14.2 11.5 - 14.5 %    Platelets 172 150 - 450 x10*3/uL    Neutrophils % 50.4 40.0 - 80.0 %    Immature Granulocytes %, Automated 0.3 0.0 - 0.9 %    Lymphocytes % 19.0 13.0 - 44.0 %    Monocytes % 11.8 2.0 - 10.0 %    Eosinophils % 17.5 0.0 - 6.0 %    Basophils % 1.0 0.0 - 2.0 %    Neutrophils Absolute 3.93 1.20 - 7.70 x10*3/uL    Immature Granulocytes Absolute, Automated 0.02 0.00 - 0.70 x10*3/uL    Lymphocytes Absolute 1.48 1.20 - 4.80 x10*3/uL    Monocytes Absolute 0.92 0.10 - 1.00 x10*3/uL    Eosinophils Absolute 1.36 (H) 0.00 - 0.70 x10*3/uL    Basophils Absolute 0.08 0.00 - 0.10 x10*3/uL   Renal function panel   Result Value Ref Range    Glucose 95 74 - 99 mg/dL    Sodium 141 136 - 145 mmol/L    Potassium 4.2 3.5 - 5.3 mmol/L    Chloride 99 98 - 107 mmol/L    Bicarbonate 29 21 - 32 mmol/L    Anion Gap 17 10 - 20 mmol/L    Urea Nitrogen 18 6 - 23 mg/dL    Creatinine 7.48 (H) 0.50 - 1.05 mg/dL    eGFR 6 (L) >60 mL/min/1.73m*2    Calcium 9.8 8.6 - 10.6 mg/dL    Phosphorus 5.5 (H) 2.5 - 4.9 mg/dL    Albumin 3.6 3.4 - 5.0 g/dL   Magnesium   Result Value Ref Range    Magnesium 2.42 (H) 1.60 - 2.40 mg/dL   POCT GLUCOSE   Result Value Ref Range    POCT Glucose 107 (H) 74 - 99 mg/dL   POCT GLUCOSE   Result Value Ref Range    POCT Glucose 149 (H) 74 - 99 mg/dL     MR foot right w and wo IV contrast    Result Date: 12/5/2024  Interpreted By:  Steven Cintron,  and Ana Rodney STUDY: MRI of the right foot without and with IV contrast;   INDICATION: Signs/Symptoms:OM? Gangrenous 2nd digit     COMPARISON: Right foot radiographs 11/29/2024.   ACCESSION NUMBER(S): [id number]   ORDERING CLINICIAN: DANNY WALKER   TECHNIQUE: Multiplanar multisequence MRI of the  right foot was performed without and with IV contrast. 17 mL Dotarem were administered intravenous.   FINDINGS: Soft tissues: A focal soft tissue defect is noted in the  lateral aspect of the distal 5th digit. There is moderate diffuse subcutaneous edema of the foot and visualized ankle. No discrete rim enhancing fluid collection to suggest abscess.   Bones: Marrow edema and enhancement is noted within the middle and distal phalanxes of the 5th digit. There is associated relative T1 hypointensity.   Marrow edema is noted within the middle and distal phalanxes of the 2nd digit. No abnormal loss of T1 marrow signal or enhancement to suggest reactive osteitis or osteomyelitis within the 2nd digit.   There is no evidence of a fracture.   There is hallux valgus deformity with mild 1st MTP degenerative changes. There is mild degenerative change of the tarsometatarsal joints as well.   Muscles:  Edema and atrophy of the plantar foot musculature, compatible with diabetic myopathy and/or myositis.   Miscellaneous:  Visualized tendons and Lisfranc ligament are grossly intact.       1. Osteomyelitis involving the middle and distal phalanxes of the 5th digit. 2. Marrow edema within the middle and distal phalanxes of the 2nd digit, with differential including reactive osteitis and early osteomyelitis. No abnormal marrow signal loss or enhancement within this digit to suggest definite osteomyelitis however. Please correlate with presence of ulcer at this location 3. Ischemic myopathy versus myositis in the plantar musculature..   I personally reviewed the images/study and I agree with the findings as stated by Ronel Perez MD. This study was interpreted at [facility], [place], [place].   MACRO: None   Signed by: Steven Cintron 12/5/2024 6:30 AM Dictation workstation:   [id number]    Vascular US Lower Extremity Hemodialysis Access Duplex Right    Result Date: 12/4/2024  Preliminary Cardiology Report           [place] [address]   Tel [phone] and Fax [phone]              Preliminary Vascular Lab  Report  VASC US LOWER EXTREMITY HEMODIALYSIS ACCESS DUPLEX RIGHT  Patient Name:      FERNANDO CRUZ        Reading Physician: [id number] Audrey HERNANDEZ MD Study Date:        12/4/2024         Ordering           [id number] DANNY CRUZAHIM                                      Physician: MRN/PID:           [id number]          Technologist:      Katherine Shields RVT Accession#:        [id number]      Technologist 2: Date of Birth/Age: 1970         Encounter#:        [id number] Gender:            F Admission Status:  Inpatient         Location           [facility]                                      Performed:  Diagnosis/ICD: End stage renal disease (ESRD)-N18.6 Procedure/CPT: 70849 Duplex Hemodialysis Access  PRELIMINARY CONCLUSIONS: Dialysis Access Evaluation: Right femoral AVG appears widely patent. Stent in mid AVF. Flow in the mid difficult to visualize due to shadowing artifact from calcification. Calcified plaque visualized in the mid to distal AVF. AVF is ectatic in the mid-dist to distal segment. Diam 15.59 mm.  Imaging & Doppler Findings:  Dialysis Access Graft                 Right Velocity Arterial Anast  130 cm/s Flow Prox       362 cm/s Flow Prox/Mid   379 cm/s Mid             367 cm/s Mid/Flow Distal 234 cm/s Flow Distal     203 cm/s Outflow Anast   115 cm/s Outflow Vein    218 cm/s Pre Anast       182 cm/s Post Anast      76 cm/s Mid Stent       367 cm/s                 PSV      Diameter Depth Flow-prox AVF   362 cm/s 10.20 mm 8.05 mm Mid-AVF         367 cm/s 9.46 mm  1.71 mm Flow-distal  cm/s 10.14 mm 5.58 mm Volume Flow 2518.00 ml/min  VASCULAR PRELIMINARY REPORT completed by Katherine Shields RVT on 12/4/2024 at 11:03:30 AM  ** Final **     Vascular US Ankle Brachial Index (DHARMESH) Without Exercise    Result Date: 12/3/2024  Preliminary Cardiology Report           [place] [address]   Tel  [phone] and Fax [phone]            Preliminary Vascular Lab Report  [facility] US ANKLE BRACHIAL INDEX (DHARMESH) WITHOUT EXERCISE  Patient Name:     FERNANDO CRUZ        Tonny Physician: [id number] MARY Oconnell MD Study Date:       12/3/2024         Ordering           [id number] DANNY JAYLA CRUZWALKER                                     Physician: MRN/PID:          [id number]          Technologist:      Dary Gonzalez RVT Accession#:       [id number]      Technologist 2: Date of           1970         Encounter#:        [id number] Birth/Age: Gender:           F Admission Status: Inpatient         Location           [facility]                                     Performed:  Diagnosis/ICD: Peripheral vascular disease, unspecified-I73.9 Procedure/CPT: 07686 Peripheral artery DHARMESH Only  PRELIMINARY CONCLUSIONS: Right Lower PVR: Evidence of moderate arterial occlusive disease in the right lower extremity at rest. Decreased digital perfusion noted. Lymphedema noted right arm. AVG noted in right groin. Unable to do DHARMESH due unable to obtain brachial pressures. Level of disease is called off of PVR waveforms and Doppler tracings only. Left Lower PVR: No evidence of arterial occlusive disease in the left lower extremity at rest. Decreased digital perfusion noted. Unable to do DHARMESH due unable to obtain brachial pressures. Level of disease is called off of PVR waveforms and Doppler tracings only. AVF noted in left arm.  Imaging & Doppler Findings:  RIGHT Lower PVR                Pressures Right Posterior Tibial (Ankle) 157 mmHg Right Dorsalis Pedis (Ankle)   117 mmHg Right Digit (Great Toe)        24 mmHg   LEFT Lower PVR                Pressures Left High Thigh               103 mmHg Left Posterior Tibial (Ankle) 123 mmHg Left Dorsalis Pedis (Ankle)   89 mmHg Left Digit (Great Toe)        38 mmHg   VASCULAR PRELIMINARY REPORT completed by Dary Gonzalez RVT on 12/3/2024 at  4:09:53 PM  ** Final **     XR foot right 3+ views    Result Date: 11/29/2024  STUDY: Foot Radiographs; 11/29/2024, 6:34 PM INDICATION: 2nd toe has a ulcer. COMPARISON: None Available. ACCESSION NUMBER(S): [id number] ORDERING CLINICIAN: MEHNAZ FRANCISCO TECHNIQUE:  three view(s) of the right foot. FINDINGS:  There is soft tissue ulceration at the distal second toe, with minimal soft tissue irregularity. There is well-defined osteolysis involving the mid portion of the adjacent distal phalanx. Elsewhere, there is minimal generalized swelling in the foot, with minimal vascular calcifications. There is minimal hallux valgus.    Soft tissue ulceration distal second toe, with well-defined osteolysis involving the mid portion of the adjacent distal phalanx Signed by Flaco Tim MD         Assessment/Plan   Principal Problem:    Osteomyelitis of right foot (Multi)  Active Problems:    Peripheral vascular disease (CMS-[facility])    Hemodialysis-associated hypotension    Coronary artery disease involving native coronary artery of native heart without angina pectoris    ESRD (end stage renal disease) on dialysis (Multi)    Controlled type 2 diabetes mellitus with diabetic polyneuropathy, without long-term current use of insulin    Osteomyelitis    Diabetic ulcer of toe associated with diabetes mellitus due to underlying condition, limited to breakdown of skin    Peripheral vascular disease, unspecified (CMS-[facility])    #Peripheral vascular disease  #Dry gangrene, right foot  #Osteomyelitis, right foot  #Diabetes     -Patient was examined and findings were discussed with patient  -Chart, labs, and imaging were reviewed and relevant results were discussed  -Labs show WBC 7.8  -Blood cultures negative to date  -Right foot x-rays demonstrate osteomyelitis of the second distal phalanx  -Right foot MRI shows osteomyelitis of the fifth digit and likely osteomyelitis of the second digit  -PVRs showed moderate arterial disease on the  right  -Vascular surgery is on consult, patient is going for angiogram tomorrow with Dr. Liao  Recommend daily dressing changes with gauze placed between the right foot toes and a light kerlix wrap, patient refusing  -Patient was upset yesterday as she has been displeased with some of her care here and had requested transfer to [facility]. I had an extensive discussion with her about her health, the care teams involved, and her treatment plan. Patient is amenable to staying at   -Full podiatry plan is pending vascular intervention, patient will likely need surgical intervention next week after revasc. Will assess with attending over the weekend once vascular procedure completed  -Patient was discussed with attending, Dr. Aliyah Cox, LORENZO PGY-3     I saw and evaluated the patient. I personally obtained the key and critical portions of the history and physical exam or was physically present for key and critical portions performed by the resident/fellow. I reviewed the resident/fellow's documentation and discussed the patient with the resident/fellow. I agree with the resident/fellow's medical decision making as documented in the note.    I spent 30 minutes in the professional and overall care of this patient.    Ben Ly DPM'

## 2024-12-05 NOTE — CARE PLAN
The patient's goals for the shift include      The clinical goals for the shift include Pt will remain safe and free from falls      Problem: Pain - Adult  Goal: Verbalizes/displays adequate comfort level or baseline comfort level  Outcome: Progressing     Problem: Safety - Adult  Goal: Free from fall injury  Outcome: Progressing     Problem: Discharge Planning  Goal: Discharge to home or other facility with appropriate resources  Outcome: Progressing     Problem: Chronic Conditions and Co-morbidities  Goal: Patient's chronic conditions and co-morbidity symptoms are monitored and maintained or improved  Outcome: Progressing     Problem: Skin  Goal: Decreased wound size/increased tissue granulation at next dressing change  Outcome: Progressing  Goal: Participates in plan/prevention/treatment measures  Outcome: Progressing  Goal: Prevent/manage excess moisture  Outcome: Progressing  Goal: Prevent/minimize sheer/friction injuries  Outcome: Progressing  Goal: Promote/optimize nutrition  Outcome: Progressing  Goal: Promote skin healing  Outcome: Progressing

## 2024-12-05 NOTE — PROGRESS NOTES
Rody Espinosa is a 53 y.o. female w/ PMH of ESRD s/p failedrenal transplant in 2001, on IHD, T2DM with polyneuropathy, PAD, HLD, CAD who presented to [facility] on 11/29 for severe pain of her right second toe with foul smelling drainage. She was transferred from [facility] on 12/2 overnight for vascular surgery evaluation.      Subjective   Patient seen at bedside this morning. Her pain is better controlled in her feet. She reports some burning pain in the fingertips on her right hand that has been ongoing for a few weeks. No other complaints.         Objective     Last Recorded Vitals  /65   Pulse 88   Temp 36.6 °C (97.9 °F)   Resp 18   Wt 84.5 kg (186 lb 4.6 oz)   SpO2 93%   Intake/Output last 3 Shifts:    Intake/Output Summary (Last 24 hours) at 12/5/2024 0636  Last data filed at 12/4/2024 1800  Gross per 24 hour   Intake 1500 ml   Output 871 ml   Net 629 ml       Admission Weight  Weight: 84.5 kg (186 lb 4.6 oz) (12/03/24 1144)    Daily Weight  12/03/24 : 84.5 kg (186 lb 4.6 oz)    Image Results  MR foot right w and wo IV contrast  Narrative: Interpreted By:  Steven Cintron,  Murphy Rodney   STUDY:  MRI of the right foot without and with IV contrast;      INDICATION:  Signs/Symptoms:OM? Gangrenous 2nd digit          COMPARISON:  Right foot radiographs 11/29/2024.      ACCESSION NUMBER(S):  [id number]      ORDERING CLINICIAN:  DANNY WALKER      TECHNIQUE:  Multiplanar multisequence MRI of the  right foot was performed  without and with IV contrast. 17 mL Dotarem were administered  intravenous.      FINDINGS:  Soft tissues: A focal soft tissue defect is noted in the lateral  aspect of the distal 5th digit. There is moderate diffuse  subcutaneous edema of the foot and visualized ankle. No discrete rim  enhancing fluid collection to suggest abscess.      Bones:  Marrow edema and enhancement is noted within the middle and distal  phalanxes of the 5th digit. There is associated relative  T1  hypointensity.      Marrow edema is noted within the middle and distal phalanxes of the  2nd digit. No abnormal loss of T1 marrow signal or enhancement to  suggest reactive osteitis or osteomyelitis within the 2nd digit.      There is no evidence of a fracture.      There is hallux valgus deformity with mild 1st MTP degenerative  changes. There is mild degenerative change of the tarsometatarsal  joints as well.      Muscles:  Edema and atrophy of the plantar foot musculature,  compatible with diabetic myopathy and/or myositis.      Miscellaneous:  Visualized tendons and Lisfranc ligament are grossly  intact.      Impression: 1. Osteomyelitis involving the middle and distal phalanxes of the 5th  digit.  2. Marrow edema within the middle and distal phalanxes of the 2nd  digit, with differential including reactive osteitis and early  osteomyelitis. No abnormal marrow signal loss or enhancement within  this digit to suggest definite osteomyelitis however. Please  correlate with presence of ulcer at this location  3. Ischemic myopathy versus myositis in the plantar musculature..      I personally reviewed the images/study and I agree with the findings  as stated by Ronel Perez MD. This study was interpreted at  [facility], [place], [place].      MACRO:  None      Signed by: Steven Cintron 12/5/2024 6:30 AM  Dictation workstation:   [id number]      Physical Exam  Constitutional:       General: She is not in acute distress.     Appearance: Normal appearance.   HENT:      Head: Normocephalic and atraumatic.      Mouth/Throat:      Mouth: Mucous membranes are moist.   Eyes:      Extraocular Movements: Extraocular movements intact.      Pupils: Pupils are equal, round, and reactive to light.      Comments: Patient is blind in L eye   Cardiovascular:      Rate and Rhythm: Normal rate and regular rhythm.      Heart sounds: Normal heart sounds.   Pulmonary:      Effort: Pulmonary  effort is normal.      Breath sounds: Normal breath sounds.   Abdominal:      General: Abdomen is flat.      Palpations: Abdomen is soft.   Musculoskeletal:      Comments: Patient has non-pitting edema in her RUE, at her baseline per patient    R second toe necrotic wound, currently wrapped. See media tab for images.   Skin:     General: Skin is warm and dry.   Neurological:      General: No focal deficit present.      Mental Status: She is alert and oriented to person, place, and time.         Relevant Results               Assessment/Plan      Assessment & Plan  Osteomyelitis    Peripheral vascular disease (CMS-HCC)    Hemodialysis-associated hypotension    Coronary artery disease involving native coronary artery of native heart without angina pectoris    Osteomyelitis of right foot (Multi)    ESRD (end stage renal disease) on dialysis (Multi)    Controlled type 2 diabetes mellitus with diabetic polyneuropathy, without long-term current use of insulin    Diabetic ulcer of toe associated with diabetes mellitus due to underlying condition, limited to breakdown of skin    Rody ANTHONY Espinosa is a 53 y.o. female w/ PMH of ESRD s/p failed renal transplant in 2001, on IHD, T2DM with polyneuropathy, PAD, HLD, CAD who is transferred from [facility] for vascular surgery evaluation iso of right second toe gangrene concerning for OM.    Updates 12/5:  -Patient having neuropathic pain in R hand, checking vitamin B12  -MRI R foot showed OM in 5th digit and concern for OM in 2nd digit  -Vascular surgery planning angiogram tomorrow, NPO at midnight  -Patient requested transfer to [facility] on 12/5, [facility] accepted patient but is at high capacity. Unclear when bed would become available.  -Podiatry following    Pre-op optimization and risk stratification  Hx: CAD (MI in 2006, PCI with DESx2), PAD, T2DM, ESRD with RLE AVG, HOLLY    Labs: Elevated creatinine and phosphorus given ESRD on iHD, INR 1.1, LFTs wnl, mild stable anemia  with hgb 10.1    Last TTE 1/19/24 with normal BiV function    Meds :   -At home on metoprolol succinate 25mg, does not take daily, currently held  -Not on insulin at home, T2DM controlled with glipizide, currently on corrective insulin scale #2  -Atorvastatin 40mg daily  -Aspirin 81mg daily    Cardiac Risk Assessment:  -emergency procedure: no  -elevated risk procedure: no   -active ACS: low concern  -RCRI: 2 pt (10.1% 30-day risk of death, MI, or cardiac arrest)  -DASI: Patient in procedure, will evaluate DASI checklist and score tomorrow morning when patient is available.    STOP-BANG: Patient with documented history of HOLLY      #Osteomyelitis of right foot  #Diabetic ulcer of right second toe  -Patient notes pain in foot has been worsening over months  -About a week ago, her right second toe started draining foul smelling fluid  -ESR and CRP elevated  -X-ray of foot showed soft tissue ulceration to the distal second toe with well-defined osteolysis involving the midportion of the adjacent distal phalanx  -Evaluated by podiatry at [facility]; recommended vascular surgery at [facility]  -Started on vanc and zosyn; ID consulted at [facility] and agreed to continue  -Blood culture no growth at 4 days  PLAN:  -MRI R foot showed OM in 5th digit and concern for OM in 2nd digit  -Vascular surgery planning angiogram 12/6, NPO at midnight  -Podiatry following, plan pending MRI and vascular surgery plan  -Continue vancomycin and zosyn     #ESRD s/p failed transplant  -On dialysis MWF  -right thigh AVG   -Sevelamer 2400 TID at home  PLAN:  -Nephro consulted for dialysis MWF  -Continue home sevelamer 2400 TID    #Right hand neuropathic pain  -Has been ongoing for a few weeks, limited to fingertips with occasional electric shooting pain going up arm  -Differential includes cervical radiculopathy, vit B12 def., neuropathy from diabetes, neuropathy secondary to her chronic RUE edema  Plan:  -Check vit. B12  -Continue home pregabalin     #T2DM  c/b polyneuropathy  -Hold home glipizide  -SSI  -Hypoglycemia protocol  -Continue pregabalin 100 nightly     #Hypotension (resolved)  -On metoprolol succinate 25 mg at home; Patient notes mainly for HR  -Has been held at [facility] iso of normotensive blood pressure  -Started on midodrine 5 mg TID at [facility] for hypotension  PLAN:  -Hold home metoprolol succinate 25 mg  -Stopped midodrine given normotension     #CAD  #PAD  #HLD  -Continue ASA 81  -Continue Atorvastatin 40 mg     F: PRN  E: PRN  N: Renal, consistent carb diet, NPO at midnight  A: PIV, rt thigh AVG     DVT ppx: Hold in case of vascular procedure  GI ppx: PPI     Code status: Full code  Contact: Montez Rosario () ([phone])            Sin Kovacs MD  PGY-1

## 2024-12-06 ENCOUNTER — ANESTHESIA (OUTPATIENT)
Dept: OPERATING ROOM | Facility: HOSPITAL | Age: 54
End: 2024-12-06
Payer: COMMERCIAL

## 2024-12-06 VITALS
OXYGEN SATURATION: 97 % | DIASTOLIC BLOOD PRESSURE: 71 MMHG | RESPIRATION RATE: 17 BRPM | WEIGHT: 186.29 LBS | TEMPERATURE: 97.2 F | SYSTOLIC BLOOD PRESSURE: 121 MMHG | HEIGHT: 67 IN | HEART RATE: 89 BPM | BODY MASS INDEX: 29.24 KG/M2

## 2024-12-06 LAB
ALBUMIN SERPL BCP-MCNC: 3.6 G/DL (ref 3.4–5)
ANION GAP SERPL CALC-SCNC: 16 MMOL/L (ref 10–20)
BASOPHILS # BLD AUTO: 0.08 X10*3/UL (ref 0–0.1)
BASOPHILS NFR BLD AUTO: 1 %
BUN SERPL-MCNC: 28 MG/DL (ref 6–23)
CALCIUM SERPL-MCNC: 9.9 MG/DL (ref 8.6–10.6)
CHLORIDE SERPL-SCNC: 98 MMOL/L (ref 98–107)
CO2 SERPL-SCNC: 33 MMOL/L (ref 21–32)
CREAT SERPL-MCNC: 11.08 MG/DL (ref 0.5–1.05)
EGFRCR SERPLBLD CKD-EPI 2021: 4 ML/MIN/1.73M*2
EOSINOPHIL # BLD AUTO: 2.06 X10*3/UL (ref 0–0.7)
EOSINOPHIL NFR BLD AUTO: 24.6 %
ERYTHROCYTE [DISTWIDTH] IN BLOOD BY AUTOMATED COUNT: 14.5 % (ref 11.5–14.5)
GLUCOSE BLD MANUAL STRIP-MCNC: 135 MG/DL (ref 74–99)
GLUCOSE SERPL-MCNC: 84 MG/DL (ref 74–99)
HCT VFR BLD AUTO: 27.6 % (ref 36–46)
HGB BLD-MCNC: 9.6 G/DL (ref 12–16)
IMM GRANULOCYTES # BLD AUTO: 0.02 X10*3/UL (ref 0–0.7)
IMM GRANULOCYTES NFR BLD AUTO: 0.2 % (ref 0–0.9)
LYMPHOCYTES # BLD AUTO: 1.56 X10*3/UL (ref 1.2–4.8)
LYMPHOCYTES NFR BLD AUTO: 18.6 %
MAGNESIUM SERPL-MCNC: 2.33 MG/DL (ref 1.6–2.4)
MCH RBC QN AUTO: 30.6 PG (ref 26–34)
MCHC RBC AUTO-ENTMCNC: 34.8 G/DL (ref 32–36)
MCV RBC AUTO: 88 FL (ref 80–100)
MONOCYTES # BLD AUTO: 0.76 X10*3/UL (ref 0.1–1)
MONOCYTES NFR BLD AUTO: 9.1 %
NEUTROPHILS # BLD AUTO: 3.91 X10*3/UL (ref 1.2–7.7)
NEUTROPHILS NFR BLD AUTO: 46.5 %
NRBC BLD-RTO: 0 /100 WBCS (ref 0–0)
PHOSPHATE SERPL-MCNC: 6.4 MG/DL (ref 2.5–4.9)
PLATELET # BLD AUTO: 183 X10*3/UL (ref 150–450)
POTASSIUM SERPL-SCNC: 4.2 MMOL/L (ref 3.5–5.3)
RBC # BLD AUTO: 3.14 X10*6/UL (ref 4–5.2)
SODIUM SERPL-SCNC: 143 MMOL/L (ref 136–145)
VANCOMYCIN SERPL-MCNC: 28.8 UG/ML (ref 5–20)
WBC # BLD AUTO: 8.4 X10*3/UL (ref 4.4–11.3)

## 2024-12-06 PROCEDURE — 2500000001 HC RX 250 WO HCPCS SELF ADMINISTERED DRUGS (ALT 637 FOR MEDICARE OP)

## 2024-12-06 PROCEDURE — 85025 COMPLETE CBC W/AUTO DIFF WBC: CPT

## 2024-12-06 PROCEDURE — 2500000001 HC RX 250 WO HCPCS SELF ADMINISTERED DRUGS (ALT 637 FOR MEDICARE OP): Performed by: STUDENT IN AN ORGANIZED HEALTH CARE EDUCATION/TRAINING PROGRAM

## 2024-12-06 PROCEDURE — 80069 RENAL FUNCTION PANEL: CPT

## 2024-12-06 PROCEDURE — 99239 HOSP IP/OBS DSCHRG MGMT >30: CPT

## 2024-12-06 PROCEDURE — 83735 ASSAY OF MAGNESIUM: CPT

## 2024-12-06 PROCEDURE — 80202 ASSAY OF VANCOMYCIN: CPT

## 2024-12-06 PROCEDURE — 2500000002 HC RX 250 W HCPCS SELF ADMINISTERED DRUGS (ALT 637 FOR MEDICARE OP, ALT 636 FOR OP/ED)

## 2024-12-06 PROCEDURE — 90935 HEMODIALYSIS ONE EVALUATION: CPT | Performed by: INTERNAL MEDICINE

## 2024-12-06 PROCEDURE — 2500000004 HC RX 250 GENERAL PHARMACY W/ HCPCS (ALT 636 FOR OP/ED): Performed by: INTERNAL MEDICINE

## 2024-12-06 PROCEDURE — 2500000004 HC RX 250 GENERAL PHARMACY W/ HCPCS (ALT 636 FOR OP/ED)

## 2024-12-06 PROCEDURE — 8010000001 HC DIALYSIS - HEMODIALYSIS PER DAY

## 2024-12-06 PROCEDURE — 36415 COLL VENOUS BLD VENIPUNCTURE: CPT

## 2024-12-06 PROCEDURE — 82947 ASSAY GLUCOSE BLOOD QUANT: CPT

## 2024-12-06 PROCEDURE — 2500000004 HC RX 250 GENERAL PHARMACY W/ HCPCS (ALT 636 FOR OP/ED): Performed by: STUDENT IN AN ORGANIZED HEALTH CARE EDUCATION/TRAINING PROGRAM

## 2024-12-06 RX ORDER — CIPROFLOXACIN 250 MG/1
500 TABLET, FILM COATED ORAL DAILY
Qty: 28 TABLET | Refills: 0 | Status: SHIPPED | OUTPATIENT
Start: 2024-12-06 | End: 2024-12-20

## 2024-12-06 RX ORDER — VANCOMYCIN HYDROCHLORIDE 500 MG/100ML
500 INJECTION, SOLUTION INTRAVENOUS
Status: DISCONTINUED | OUTPATIENT
Start: 2024-12-06 | End: 2024-12-06 | Stop reason: HOSPADM

## 2024-12-06 RX ORDER — HEPARIN SODIUM 1000 [USP'U]/ML
3500 INJECTION, SOLUTION INTRAVENOUS; SUBCUTANEOUS ONCE
Status: COMPLETED | OUTPATIENT
Start: 2024-12-06 | End: 2024-12-06

## 2024-12-06 RX ORDER — DOXYCYCLINE 100 MG/1
100 CAPSULE ORAL 2 TIMES DAILY
Qty: 28 CAPSULE | Refills: 0 | Status: SHIPPED | OUTPATIENT
Start: 2024-12-06 | End: 2024-12-20

## 2024-12-06 RX ORDER — OXYCODONE HYDROCHLORIDE 5 MG/1
5 TABLET ORAL EVERY 4 HOURS PRN
Qty: 3 TABLET | Refills: 0 | Status: SHIPPED | OUTPATIENT
Start: 2024-12-06

## 2024-12-06 ASSESSMENT — PAIN - FUNCTIONAL ASSESSMENT: PAIN_FUNCTIONAL_ASSESSMENT: 0-10

## 2024-12-06 ASSESSMENT — COGNITIVE AND FUNCTIONAL STATUS - GENERAL
MOBILITY SCORE: 23
DAILY ACTIVITIY SCORE: 24
CLIMB 3 TO 5 STEPS WITH RAILING: A LITTLE

## 2024-12-06 ASSESSMENT — PAIN SCALES - GENERAL
PAINLEVEL_OUTOF10: 2
PAINLEVEL_OUTOF10: 4
PAINLEVEL_OUTOF10: 7

## 2024-12-06 NOTE — DISCHARGE SUMMARY
Discharge Diagnosis  Osteomyelitis of right foot (Multi)    Issues Requiring Follow-Up  Patient needs vascular surgery and podiatry evaluation for confirmed OM of R 5th toe and possible OM of R 2nd toe. She stated she would present at [facility] for treatment but was sent with a prescription for PO doxy and cipro to bridge her to receiving further care.    Discharge Meds     Medication List      START taking these medications     ciprofloxacin 250 mg tablet; Commonly known as: Cipro; Take 2 tablets   (500 mg) by mouth once daily for 14 days.   doxycycline 100 mg capsule; Commonly known as: Vibramycin; Take 1   capsule (100 mg) by mouth 2 times a day for 14 days. Take with at least 8   ounces (large glass) of water, do not lie down for 30 minutes after   oxyCODONE 5 mg immediate release tablet; Commonly known as: Roxicodone;   Take 1 tablet (5 mg) by mouth every 4 hours if needed for severe pain (7 -   10).     CONTINUE taking these medications     acetaminophen 325 mg tablet; Commonly known as: Tylenol   aspirin 81 mg EC tablet   atorvastatin 40 mg tablet; Commonly known as: Lipitor; TAKE ONE TABLET   BY MOUTH AT BEDTIME   cetirizine 5 mg tablet; Commonly known as: ZyrTEC   clobetasol 0.05 % ointment; Commonly known as: Temovate   glipiZIDE 5 mg tablet; Commonly known as: Glucotrol   ketoconazole 2 % shampoo; Commonly known as: NIZOral   metoprolol succinate XL 25 mg 24 hr tablet; Commonly known as:   Toprol-XL; Take 1 tablet (25 mg) by mouth once daily.   Nephron FA 66 mg iron- 1,000 mcg tablet; Generic drug: vit B comp C   no.24-iron-folic   omeprazole 40 mg DR capsule; Commonly known as: PriLOSEC   ondansetron 4 mg tablet; Commonly known as: Zofran   polyethylene glycol 17 gram packet; Commonly known as: Glycolax, Miralax   pregabalin 25 mg capsule; Commonly known as: Lyrica   sevelamer carbonate 800 mg tablet; Commonly known as: Renvela   timolol 0.5 % ophthalmic solution; Commonly known as: Timoptic    triamcinolone 0.1 % ointment; Commonly known as: Kenalog       Test Results Pending At Discharge  Pending Labs       No current pending labs.            Hospital Course  Rody Espinosa is a 53 y.o. female w/ PMH of ESRD s/p failed renal transplant in 2001, on IHD, T2DM with polyneuropathy, PAD, HLD, CAD who presented to [facility] on 11/29 for severe pain of her right second toe with foul smelling drainage. At [facility] she was started on vancomycin and zosyn. There were plans to do vascular studies to assess blood flow but due to the presence of a R groin graft, [place] [facility] [place].     [place] [facility] [place] of her R femoral AVG which was found to be widely patent. DHARMESH showed moderate occlusive disease in RLE and none in LLE although she did have decreased digital perfusion in both LE. MRI R foot showed OM in 5th digit and reactive osteitis vs early osteomyelitis in the 2nd digit.     Podiatry was consulted and following the patient with surgical plan pending vascular surgery plan. She was planned for angiogram with vascular surgery on 12/6, however patient requested transfer to [facility] to see her previous vascular surgeon and declined further workup and intervention by vascular surgery or podiatry at [facility]. Patient was accepted as a transfer to [facility] pending bed availability. Patient opted to present to [facility] directly via transport by family member. Our recommendation was to stay and complete IV antibiotics and undergo vascular and podiatry workup but she declined so was discharged with a 2 week prescription for PO doxy and cipro to bridge her with antibiotic coverage in case there is a delay in her receiving further care. She stated that she would present immediately to [facility] ED for evaluation.    Pertinent Physical Exam At Time of Discharge  Physical Exam  Constitutional:       General: She is not in acute distress.     Appearance: Normal appearance.    HENT:      Head: Normocephalic and atraumatic.      Mouth/Throat:      Mouth: Mucous membranes are moist.   Eyes:      Extraocular Movements: Extraocular movements intact.      Pupils: Pupils are equal, round, and reactive to light.      Comments: Patient is blind in L eye   Cardiovascular:      Rate and Rhythm: Normal rate and regular rhythm.      Heart sounds: Normal heart sounds.   Pulmonary:      Effort: Pulmonary effort is normal.      Breath sounds: Normal breath sounds.   Abdominal:      General: Abdomen is flat.      Palpations: Abdomen is soft.   Musculoskeletal:      Comments: Patient has non-pitting edema in her RUE, at her baseline per patient    R 2nd and 5th toes with necrotic wound   Skin:     General: Skin is warm and dry.   Neurological:      General: No focal deficit present.      Mental Status: She is alert and oriented to person, place, and time.         Outpatient Follow-Up  No future appointments.      Sin Kovacs MD  PGY-1

## 2024-12-06 NOTE — CARE PLAN
The clinical goals for the shift include Pain management    Problem: Pain - Adult  Goal: Verbalizes/displays adequate comfort level or baseline comfort level  Outcome: Progressing     Problem: Safety - Adult  Goal: Free from fall injury  Outcome: Progressing     Problem: Discharge Planning  Goal: Discharge to home or other facility with appropriate resources  Outcome: Progressing     Problem: Chronic Conditions and Co-morbidities  Goal: Patient's chronic conditions and co-morbidity symptoms are monitored and maintained or improved  Outcome: Progressing     Problem: Skin  Goal: Decreased wound size/increased tissue granulation at next dressing change  Outcome: Progressing  Goal: Participates in plan/prevention/treatment measures  Outcome: Progressing  Goal: Prevent/manage excess moisture  Outcome: Progressing  Goal: Prevent/minimize sheer/friction injuries  Outcome: Progressing  Goal: Promote/optimize nutrition  Outcome: Progressing  Goal: Promote skin healing  Outcome: Progressing

## 2024-12-06 NOTE — PROGRESS NOTES
Vancomycin Dosing by Pharmacy- FOLLOW UP    Rody Espinosa is a 53 y.o. year old female who Pharmacy has been consulted for vancomycin dosing for osteomyelitis/septic arthritis. Based on the patient's indication and renal status this patient is being dosed based on a goal pre-HD level of 20-25.     Renal function is currently HD on MWF.    Current vancomycin dose: 750 mg given every MWF    Most recent trough level: 28.8 mcg/mL    Visit Vitals  BP (!) 134/91   Pulse 85   Temp 36.4 °C (97.5 °F) (Temporal)   Resp 16        Lab Results   Component Value Date    CREATININE 11.08 (H) 2024    CREATININE 7.48 (H) 2024    CREATININE 12.64 (H) 2024    CREATININE 9.34 (H) 2024        Patient weight is as follows:   Vitals:    24 1144   Weight: 84.5 kg (186 lb 4.6 oz)       Cultures:  No results found for the encounter in last 14 days.       No intake/output data recorded.  I/O during current shift:  I/O this shift:  In: 400 [I.V.:400]  Out: -     Temp (24hrs), Av.1 °C (97 °F), Min:34.6 °C (94.3 °F), Max:36.7 °C (98.1 °F)      Assessment/Plan    Above goal random/trough level. Orders placed for new vancomycin regimen of 500mg every MWF hours to begin at 1800 .    This dosing regimen is predicted by [employer] to result in the following pharmacokinetic parameters:  na    The next level will be obtained on  at 0500. May be obtained sooner if clinically indicated.   Will continue to monitor renal function daily while on vancomycin and order serum creatinine at least every 48 hours if not already ordered.  Follow for continued vancomycin needs, clinical response, and signs/symptoms of toxicity.       Divine Henley, PharmD   Michelle Esposito 3 Pharmacist

## 2024-12-06 NOTE — CARE PLAN
Problem: Pain - Adult  Goal: Verbalizes/displays adequate comfort level or baseline comfort level  Outcome: Progressing     Problem: Safety - Adult  Goal: Free from fall injury  Outcome: Progressing     Problem: Discharge Planning  Goal: Discharge to home or other facility with appropriate resources  Outcome: Progressing     Problem: Chronic Conditions and Co-morbidities  Goal: Patient's chronic conditions and co-morbidity symptoms are monitored and maintained or improved  Outcome: Progressing     Problem: Skin  Goal: Decreased wound size/increased tissue granulation at next dressing change  Outcome: Progressing  Goal: Participates in plan/prevention/treatment measures  Outcome: Progressing  Goal: Prevent/manage excess moisture  Outcome: Progressing  Goal: Prevent/minimize sheer/friction injuries  Outcome: Progressing  Goal: Promote/optimize nutrition  Outcome: Progressing  Goal: Promote skin healing  Outcome: Progressing   The patient's goals for the shift include      The clinical goals for the shift include Pain management

## 2024-12-06 NOTE — SIGNIFICANT EVENT
Rapid Response Nurse Note: RADAR alert: 6    Pager time:   Arrival time:   Event end time:   Location: [facility]  [x] Triage by phone or secure messaging    Rapid response initiated by:  [] Rapid response RN [] Family [] Nursing Supervisor [] Physician   [x] RADAR auto page [] Sepsis auto-page [] RN [] RT   [] NP/PA [] Other:     Primary reason for call:   [] BAT [] New CPAP/BiPAP [] Bleeding [] Change in mental status   [] Chest pain [] Code blue [] FiO2 >/= 50% [] HR </= 40 bpm   [] HR >/= 130 bpm [] Hyperglycemia [] Hypoglycemia [x] RADAR    [] RR </= 8 bpm [] RR >/= 30 bpm [] SBP </= 90 mmHg [] SpO2 < 90%   [] Seizure [] Sepsis [] Shortness of breath  [] Staff concern: see comments     Initial VS and/or RADAR VS: T 34.6 °C; HR 93; RR 16; /72; SPO2 93%.    Providers present at bedside (if applicable):     Name of ICU Provider contacted (if applicable):     Interventions:  [x] None [] ABG/VBG [] Assist w/ICU transfer [] BAT paged    [] Bag mask [] Blood [] Cardioversion [] Code Blue   [] Code blue for intubation [] Code status changed [] Chest x-ray [] EKG   [] IV fluid/bolus [] KUB x-ray [] Labs/cultures [] Medication   [] Nebulizer treatment [] NIPPV (CPAP/BiPAP) [] Oxygen [] Oral airway   [] Peripheral IV [] Palliative care consult [] CT/MRI [] Sepsis protocol    [] Suctioned [] Other:     Outcome:  [] Coded and  [] Code blue for intubation [] Coded and transferred to ICU []  on division   [x] Remained on division (no change) [] Remained on division + additional monitoring [] Remained in ED [] Transferred to ED   [] Transferred to ICU [] Transferred to inpatient status [] Transferred for interventions (procedure) [] Transferred to ICU stepdown    [] Transferred to surgery [] Transferred to telemetry [] Sepsis protocol [] STEMI protocol   [] Stroke protocol [x] Bedside nurse instructed to page rapid response for any concerns or acute change in condition/VS     Additional Comments:  Reviewed above VS with bedside RN via phone.  Vital signs re-checked and pts temp was incorrectly entered per primary RN. Temp WNL.  No acute change in condition.  No interventions by rapid response team indicated at this time.  Staff to page rapid response for any concerns or acute change in condition or VS.

## 2024-12-06 NOTE — PROGRESS NOTES
Rody Espinosa is a 53 y.o. female on day 3 of admission presenting with Osteomyelitis of right foot (Multi).      Subjective   Seen on HD, no complaints except LE pain       Objective   Vitals 24HR  Heart Rate:  [85-93]   Temp:  [34.6 °C (94.3 °F)-36.7 °C (98.1 °F)]   Resp:  [16-18]   BP: (106-134)/(63-91)   SpO2:  [93 %-98 %]     Intake/Output last 3 Shifts:    Intake/Output Summary (Last 24 hours) at 12/6/2024 1105  Last data filed at 12/6/2024 1025  Gross per 24 hour   Intake 400 ml   Output --   Net 400 ml       Physical Exam  No distress  HEENT:  moist, no pallor  No edema arturo LE  Breath sounds arturo equal, clear  S1 S2 regular, normal, no rub or murmur  Abdomen soft  AAO x3, non focal    acetaminophen, 975 mg, oral, q8h  aspirin, 81 mg, oral, Daily  atorvastatin, 40 mg, oral, Nightly  insulin lispro, 0-10 Units, subcutaneous, TID AC  pantoprazole, 40 mg, oral, Daily before breakfast  piperacillin-tazobactam, 2.25 g, intravenous, q8h  [Held by provider] polyethylene glycol, 17 g, oral, Daily  pregabalin, 100 mg, oral, Nightly  sevelamer carbonate, 2,400 mg, oral, TID  timolol, 1 drop, ophthalmic (eye), BID  vancomycin, 500 mg, intravenous, Once per day on Monday Wednesday Friday  vitamin B complex-vitamin C-folic acid, 1 capsule, oral, Daily         Assessment/Plan      53 Year old with h/o ESRD on HD admitted with R 2nd toe gangrene  Nephrology following for ESRD    #ESRD: Tolerating HD per submitted orders, 3K, 2.5 Ca, UF goal per protocol    # Anemia: HgB   Hemoglobin   Date Value Ref Range Status   12/06/2024 9.6 (L) 12.0 - 16.0 g/dL Final    continue to hold WILLARD    # MBD: Ca   Calcium   Date Value Ref Range Status   12/06/2024 9.9 8.6 - 10.6 mg/dL Final     Phosphorus   Date Value Ref Range Status   12/06/2024 6.4 (H) 2.5 - 4.9 mg/dL Final     Comment:     The performance characteristics of phosphorus testing in heparinized plasma have been validated by the individual  laboratory site where testing is  performed. Testing on heparinized plasma is not approved by the FDA; however, such approval is not necessary.   continue Sevelamer carbonate 3 tab PO TIDWM daily renal MVI.     #Hypertension: BP acceptably controlled    #Volume status continue UF as tolerated    Will continue to follow

## 2024-12-06 NOTE — SIGNIFICANT EVENT
Discussed with patient her desire to continue her vascular surgery care with her previous and long-term surgeon, Dr. Carrillo, which we believe is reasonable.   We discussed that we could still do an RLE angiogram for diagnostic purposes to help expedite her workup but she declined at this time.    She is being transferred to [facility] for further care. She is trying to obtain her medical records prior to make sure Dr. Carrillo has them.    The RLE angiogram previously planned for today was cancelled.   No further studies or intervention planned from vascular surgery here while she awaits transfer.      Discussed with Dr. Keshawn Bland MD  General Surgery PGY-3  Vascular Surgery [id number]

## 2024-12-06 NOTE — PROGRESS NOTES
PODIATRY SERVICE PROGRESS NOTE    SERVICE DATE: 12/6/2024   SERVICE TIME:  10: 33 AM    Subjective   INTERVAL HPI:   Patient seen in dialysis unit. States her vascular procedure canceled today. Patient denies any constitutional symptoms.       Medications:  Scheduled Meds: acetaminophen, 975 mg, oral, q8h  aspirin, 81 mg, oral, Daily  atorvastatin, 40 mg, oral, Nightly  insulin lispro, 0-10 Units, subcutaneous, TID AC  pantoprazole, 40 mg, oral, Daily before breakfast  piperacillin-tazobactam, 2.25 g, intravenous, q8h  [Held by provider] polyethylene glycol, 17 g, oral, Daily  pregabalin, 100 mg, oral, Nightly  sevelamer carbonate, 2,400 mg, oral, TID  timolol, 1 drop, ophthalmic (eye), BID  vancomycin, 500 mg, intravenous, Once per day on Monday Wednesday Friday  vitamin B complex-vitamin C-folic acid, 1 capsule, oral, Daily      Continuous Infusions:    PRN Meds: PRN medications: dextrose, dextrose, glucagon, glucagon, HYDROmorphone, ondansetron, oxyCODONE, oxyCODONE, vancomycin, white petrolatum         Objective   PHYSICAL EXAM:  Physical Exam Performed:  Vitals:    12/06/24 0806   BP:    Pulse: 85   Resp:    Temp: 36.4 °C (97.5 °F)   SpO2:      Body mass index is 29.17 kg/m².    General: AAOx3, no acute distress     Vasc: Dorsalis pedis and posterior tibial pulses are non-palpable bilateral.  Capillary refill time is delayed to the hallux bilateral. Skin temperature is warm to warm from proximal tibia to distal digits bilateral. There is focal edema in the right forefoot. There is dry gangrene and necrosis of the right second toe, with dusky discoloration of the third and fifth right toes.       Neuro: Light touch sensation is diminished to the foot bilateral.       Derm: Skin is supple with normal texture and turgor noted. Nails 1-5 bilateral are dystrophic. Webspaces 1-4 are clean, dry and intact bilateral. There is dry gangrene and necrosis of the right second toe, with dusky discoloration of the third and  fifth right toes.  No active drainage. No purulence. No crepitus. No fluctuance. There is malodor.       Ortho: Muscle strength is +5/5 for all four pedal muscle groups bilateral. There are no structural deformities noted bilateral.        LABS:   Results for orders placed or performed during the hospital encounter of 12/03/24 (from the past 24 hours)   POCT GLUCOSE   Result Value Ref Range    POCT Glucose 149 (H) 74 - 99 mg/dL   POCT GLUCOSE   Result Value Ref Range    POCT Glucose 169 (H) 74 - 99 mg/dL   POCT GLUCOSE   Result Value Ref Range    POCT Glucose 122 (H) 74 - 99 mg/dL   CBC and Auto Differential   Result Value Ref Range    WBC 8.4 4.4 - 11.3 x10*3/uL    nRBC 0.0 0.0 - 0.0 /100 WBCs    RBC 3.14 (L) 4.00 - 5.20 x10*6/uL    Hemoglobin 9.6 (L) 12.0 - 16.0 g/dL    Hematocrit 27.6 (L) 36.0 - 46.0 %    MCV 88 80 - 100 fL    MCH 30.6 26.0 - 34.0 pg    MCHC 34.8 32.0 - 36.0 g/dL    RDW 14.5 11.5 - 14.5 %    Platelets 183 150 - 450 x10*3/uL    Neutrophils % 46.5 40.0 - 80.0 %    Immature Granulocytes %, Automated 0.2 0.0 - 0.9 %    Lymphocytes % 18.6 13.0 - 44.0 %    Monocytes % 9.1 2.0 - 10.0 %    Eosinophils % 24.6 0.0 - 6.0 %    Basophils % 1.0 0.0 - 2.0 %    Neutrophils Absolute 3.91 1.20 - 7.70 x10*3/uL    Immature Granulocytes Absolute, Automated 0.02 0.00 - 0.70 x10*3/uL    Lymphocytes Absolute 1.56 1.20 - 4.80 x10*3/uL    Monocytes Absolute 0.76 0.10 - 1.00 x10*3/uL    Eosinophils Absolute 2.06 (H) 0.00 - 0.70 x10*3/uL    Basophils Absolute 0.08 0.00 - 0.10 x10*3/uL   Renal function panel   Result Value Ref Range    Glucose 84 74 - 99 mg/dL    Sodium 143 136 - 145 mmol/L    Potassium 4.2 3.5 - 5.3 mmol/L    Chloride 98 98 - 107 mmol/L    Bicarbonate 33 (H) 21 - 32 mmol/L    Anion Gap 16 10 - 20 mmol/L    Urea Nitrogen 28 (H) 6 - 23 mg/dL    Creatinine 11.08 (H) 0.50 - 1.05 mg/dL    eGFR 4 (L) >60 mL/min/1.73m*2    Calcium 9.9 8.6 - 10.6 mg/dL    Phosphorus 6.4 (H) 2.5 - 4.9 mg/dL    Albumin 3.6 3.4 - 5.0  g/dL   Magnesium   Result Value Ref Range    Magnesium 2.33 1.60 - 2.40 mg/dL   Vancomycin   Result Value Ref Range    Vancomycin 28.8 (H) 5.0 - 20.0 ug/mL      Lab Results   Component Value Date    HGBA1C 5.7 (H) 11/29/2024      Lab Results   Component Value Date    CRP 1.13 (H) 12/03/2024      Lab Results   Component Value Date    SEDRATE 124 (H) 11/29/2024        Results from last 7 days   Lab Units 12/06/24  0627   WBC AUTO x10*3/uL 8.4   RBC AUTO x10*6/uL 3.14*   HEMOGLOBIN g/dL 9.6*   HEMATOCRIT % 27.6*     Results from last 7 days   Lab Units 12/06/24  0627 11/30/24  0611 11/29/24  1843   SODIUM mmol/L 143   < > 137   POTASSIUM mmol/L 4.2   < > 4.4   CHLORIDE mmol/L 98   < > 93*   CO2 mmol/L 33*   < > 32   BUN mg/dL 28*   < > 20   CREATININE mg/dL 11.08*   < > 7.77*   CALCIUM mg/dL 9.9   < > 9.1   PHOSPHORUS mg/dL 6.4*   < >  --    MAGNESIUM mg/dL 2.33   < >  --    BILIRUBIN TOTAL mg/dL  --   --  0.6   ALT U/L  --   --  8   AST U/L  --   --  7*    < > = values in this interval not displayed.           IMAGING REVIEW:  Vascular US Lower Extremity Hemodialysis Access Duplex Right    Result Date: 12/5/2024            [place] [address]   Tel [phone] and Fax [phone]  Vascular Lab Report [facility] US LOWER EXTREMITY HEMODIALYSIS ACCESS DUPLEX RIGHT  Patient Name:     FERNANDO Lancaster Physician: [id number] Audrey Rodriguez MD Study Date:       12/4/2024           Ordering           [id number] DANNY WALKER                                       Physician: MRN/PID:          [id number]            Technologist:      Katherine Shields RVT Accession#:       [id number]        Technologist 2: Date of           1970 / 53      Encounter#:        [id number] Birth/Age:        years Gender:           F Admission Status: Inpatient           Location           [facility]                                        Performed:  Diagnosis/ICD: End stage renal disease (ESRD)-N18.6 CPT Codes:     41927 Duplex Hemodialysis Access  CONCLUSIONS: Dialysis Access Evaluation: Right femoral AVG appears widely patent. Right femoral AVG shows an increased velocity at the flow proximal segment which may be indicative of a stenosis. Stent in mid AVF. Flow in the mid difficult to visualize due to shadowing artifact from calcification. Calcified plaque visualized in the mid to distal AVF. AVF is ectatic in the mid-dist to distal segment. Diam 15.59 mm.  Imaging & Doppler Findings:  Dialysis Access Graft                 Right Velocity Arterial Anast  130 cm/s Flow Prox       362 cm/s Flow Prox/Mid   379 cm/s Mid             367 cm/s Mid/Flow Distal 234 cm/s Flow Distal     203 cm/s Outflow Anast   115 cm/s Outflow Vein    218 cm/s Pre Anast       182 cm/s Post Anast      76 cm/s Mid Stent       367 cm/s                 PSV      Diameter Depth Flow-prox AVF   362 cm/s 10.20 mm 8.05 mm Mid-AVF         367 cm/s 9.46 mm  1.71 mm Flow-distal  cm/s 10.14 mm 5.58 mm Volume Flow 2518.00 ml/min  [id number] Audrey Rodriguez MD Electronically signed by [id number] Audrey Rodriguez MD on 12/5/2024 at 5:07:06 PM  ** Final **     Vascular US ankle brachial index (DHARMESH) without exercise    Result Date: 12/5/2024            [place] [address]   Tel [phone] and Fax [phone]  Vascular Lab Report [facility] US ANKLE BRACHIAL INDEX (DHARMESH) WITHOUT EXERCISE  Patient Name:      FERNANDO HERNANDEZ  Reading Physician:  [id number] Yaz Liao MD Study Date:        12/5/2024           Ordering Physician: [id number] DANNY WALKER MRN/PID:           [id number]            Technologist:       Katherine Shields RVT Accession#:        [id number]        Technologist 2: Date of Birth/Age: 1970 / 53      Encounter#:         [id number]                    years Gender:            F  Admission Status:  Inpatient           Location Performed: [facility]  Diagnosis/ICD: Peripheral vascular disease, unspecified-I73.9 CPT Codes:     89084 Peripheral artery DHARMESH Only  Patient History RLE AVG.  CONCLUSIONS: Right Lower PVR: The arterial ischemic steal syndrome pressure performed on the right lower extremity with AVG. Waveform taken from right lower extremity 1st digit. Pressure uncompressed 50 mmhg and uncompmressed 47 mmhg. Waveform and pressure remain unchanged with AVG compression.  Imaging & Doppler Findings:  [id number] Yaz Liao MD Electronically signed by [id number] Yaz Liao MD on 12/5/2024 at 3:19:09 PM  ** Final **     Vascular US Ankle Brachial Index (DHARMESH) Without Exercise    Result Date: 12/5/2024            [place] [address]   Tel [phone] and Fax [phone]  Vascular Lab Report [facility] US ANKLE BRACHIAL INDEX (DHARMESH) WITHOUT EXERCISE  Patient Name:      FERNANDO HERNANDEZ   Reading Physician:  [id number] Ceci Melendez MD, RPVI Study Date:        12/3/2024            Ordering Physician: [id number] DANNY WALKER MRN/PID:           [id number]             Technologist:       Dary Gonzalez RVT Accession#:        [id number]         Technologist 2: Date of Birth/Age: 1970 / 53 years Encounter#:         [id number] Gender:            F Admission Status:  Inpatient            Location Performed: [facility]  Diagnosis/ICD: Peripheral vascular disease, unspecified-I73.9 CPT Codes:     51165 Peripheral artery DHARMESH Only  CONCLUSIONS: Right Lower PVR: Evidence of moderate arterial occlusive disease in the right lower extremity at rest. Decreased digital perfusion noted. Lymphedema noted right arm. AVG noted in right groin. Unable to do DHARMESH due unable to obtain brachial pressures. Level of disease is called off of PVR waveforms  and Doppler tracings only. Left Lower PVR: No evidence of arterial occlusive disease in the left lower extremity at rest. Decreased digital perfusion noted. Unable to do DHARMESH due unable to obtain brachial pressures. Level of disease is called off of PVR waveforms and Doppler tracings only. AVF noted in left arm.  Imaging & Doppler Findings:  RIGHT Lower PVR                Pressures Right Posterior Tibial (Ankle) 157 mmHg Right Dorsalis Pedis (Ankle)   117 mmHg Right Digit (Great Toe)        24 mmHg   LEFT Lower PVR                Pressures Left High Thigh               103 mmHg Left Posterior Tibial (Ankle) 123 mmHg Left Dorsalis Pedis (Ankle)   89 mmHg Left Digit (Great Toe)        38 mmHg   [id number] Ceci Melendez MD, RPREN Electronically signed by [id number] Ceci Melendez MD, ANTONIO on 12/5/2024 at 3:12:23 PM  ** Final **     MR foot right w and wo IV contrast    Result Date: 12/5/2024  Interpreted By:  Steven Cintron  and Ana Rodney STUDY: MRI of the right foot without and with IV contrast;   INDICATION: Signs/Symptoms:OM? Gangrenous 2nd digit     COMPARISON: Right foot radiographs 11/29/2024.   ACCESSION NUMBER(S): [id number]   ORDERING CLINICIAN: DANNY WALKER   TECHNIQUE: Multiplanar multisequence MRI of the  right foot was performed without and with IV contrast. 17 mL Dotarem were administered intravenous.   FINDINGS: Soft tissues: A focal soft tissue defect is noted in the lateral aspect of the distal 5th digit. There is moderate diffuse subcutaneous edema of the foot and visualized ankle. No discrete rim enhancing fluid collection to suggest abscess.   Bones: Marrow edema and enhancement is noted within the middle and distal phalanxes of the 5th digit. There is associated relative T1 hypointensity.   Marrow edema is noted within the middle and distal phalanxes of the 2nd digit. No abnormal loss of T1 marrow signal or enhancement to suggest reactive osteitis or osteomyelitis within the 2nd digit.   There is no  evidence of a fracture.   There is hallux valgus deformity with mild 1st MTP degenerative changes. There is mild degenerative change of the tarsometatarsal joints as well.   Muscles:  Edema and atrophy of the plantar foot musculature, compatible with diabetic myopathy and/or myositis.   Miscellaneous:  Visualized tendons and Lisfranc ligament are grossly intact.       1. Osteomyelitis involving the middle and distal phalanxes of the 5th digit. 2. Marrow edema within the middle and distal phalanxes of the 2nd digit, with differential including reactive osteitis and early osteomyelitis. No abnormal marrow signal loss or enhancement within this digit to suggest definite osteomyelitis however. Please correlate with presence of ulcer at this location 3. Ischemic myopathy versus myositis in the plantar musculature..   I personally reviewed the images/study and I agree with the findings as stated by Ronel Perez MD. This study was interpreted at [place], [place].   MACRO: None   Signed by: Steven Cintron 12/5/2024 6:30 AM Dictation workstation:   [id number]    XR foot right 3+ views    Result Date: 11/29/2024  STUDY: Foot Radiographs; 11/29/2024, 6:34 PM INDICATION: 2nd toe has a ulcer. COMPARISON: None Available. ACCESSION NUMBER(S): [id number] ORDERING CLINICIAN: MEHNAZ FRANCISCO TECHNIQUE:  three view(s) of the right foot. FINDINGS:  There is soft tissue ulceration at the distal second toe, with minimal soft tissue irregularity. There is well-defined osteolysis involving the mid portion of the adjacent distal phalanx. Elsewhere, there is minimal generalized swelling in the foot, with minimal vascular calcifications. There is minimal hallux valgus.    Soft tissue ulceration distal second toe, with well-defined osteolysis involving the mid portion of the adjacent distal phalanx Signed by Flaco Tim MD            Assessment/Plan   ASSESSMENT & PLAN:    #Peripheral  vascular disease  #Dry gangrene, right foot  #Osteomyelitis, right foot  #Diabetes    - Patient was seen and evaluated; all findings were discussed and all questions were answered to patient's satisfaction.  - Charts, labs, vitals and imaging all reviewed.   - Labs: WBC 8.4, Creat 11.08  - Blood culture: No growth at 4 days    Plan:  -Right foot x-rays demonstrate osteomyelitis of the second distal phalanx  -Right foot MRI shows osteomyelitis of the fifth digit and likely osteomyelitis of the second digit  -PVRs showed moderate arterial disease on the right  - Patient's vascular procedure canceled today because Dr. Liao was in favor of removing the fistula during the procedure, however and according to the patient, her [facility] doctor advised the patient not to do that.   - Patient requested to transfer [facility].  - Podiatry is signing off.  - Patient discussed with Attending Dr. Ly.     Candiod Murphy DPM PGY-1  Podiatric Medicine and Surgery   Epic Secure Chat            SIGNATURE: Cnadido Murphy DPM PATIENT NAME: Rody Espinosa   DATE: December 6, 2024 MRN: [id number]   TIME: 10:33 AM CONTACT: Haiku Message      I saw and evaluated the patient. I personally obtained the key and critical portions of the history and physical exam or was physically present for key and critical portions performed by the resident/fellow. I reviewed the resident/fellow's documentation and discussed the patient with the resident/fellow. I agree with the resident/fellow's medical decision making as documented in the note.    I spent 30 minutes in the professional and overall care of this patient.    Ben Ly DPM

## 2024-12-06 NOTE — NURSING NOTE
Report from Sending RN:    Report From: Gloria Rajput ( RN)  Recent Surgery of Procedure: No  Baseline Level of Consciousness (LOC): a/o x 4  Oxygen Use: No  Type: none  Diabetic: Yes, 122  Last BP Med Given Day of Dialysis: none  Last Pain Med Given: none  Lab Tests to be Obtained with Dialysis: No  Blood Transfusion to be Given During Dialysis: No  Available IV Access: Yes  Medications to be Administered During Dialysis: No  Continuous IV Infusion Running: No  Restraints on Currently or in the Last 24 Hours: No  Hand-Off Communication: No acute overnight or morning events; vss; Pt did not take morning medications; pt will not need labs; Pt is a full code;   Dialysis Catheter Dressing: fistula right thigh  Last Dressing Change: yes

## 2024-12-10 ENCOUNTER — TELEPHONE (OUTPATIENT)
Dept: TRANSPLANT | Facility: HOSPITAL | Age: 54
End: 2024-12-10
Payer: COMMERCIAL

## 2024-12-30 ENCOUNTER — DOCUMENTATION (OUTPATIENT)
Dept: TRANSPLANT | Facility: HOSPITAL | Age: 54
End: 2024-12-30
Payer: COMMERCIAL

## 2024-12-30 NOTE — PROGRESS NOTES
Update given to dialysis unit EMERALD Araujo.  Pt is currently in a rehab with osteomyelitis of the right foot.

## 2025-01-08 PROCEDURE — 86833 HLA CLASS II HIGH DEFIN QUAL: CPT | Mod: OUT | Performed by: SURGERY

## 2025-01-15 ENCOUNTER — LAB REQUISITION (OUTPATIENT)
Dept: LAB | Facility: CLINIC | Age: 55
End: 2025-01-15
Payer: COMMERCIAL

## 2025-01-15 DIAGNOSIS — N18.6 END STAGE RENAL DISEASE (MULTI): ICD-10-CM

## 2025-01-15 LAB
HLA RESULTS: NORMAL
HLA-A+B+C AB NFR SER: NORMAL %
HLA-DP+DQ+DR AB NFR SER: NORMAL %

## 2025-02-05 PROCEDURE — 86808 CYTOTOXIC ANTIBODY SCREENING: CPT | Mod: OUT | Performed by: SURGERY

## 2025-02-19 ENCOUNTER — DOCUMENTATION (OUTPATIENT)
Facility: HOSPITAL | Age: 55
End: 2025-02-19
Payer: COMMERCIAL

## 2025-02-19 NOTE — PROGRESS NOTES
Waitlist status:  Inactive due to s/p right foot TMA.    Next review:  Testing out of date    Next visit due:  TBD based on when she gets clearance from podiatry

## 2025-02-26 ENCOUNTER — LAB REQUISITION (OUTPATIENT)
Dept: LAB | Facility: CLINIC | Age: 55
End: 2025-02-26
Payer: COMMERCIAL

## 2025-02-26 DIAGNOSIS — N18.6 END STAGE RENAL DISEASE (MULTI): ICD-10-CM

## 2025-02-26 LAB — FREEZE CROSSMATCH: NORMAL

## 2025-03-05 PROCEDURE — 86808 CYTOTOXIC ANTIBODY SCREENING: CPT | Mod: OUT | Performed by: SURGERY

## 2025-03-31 ENCOUNTER — LAB REQUISITION (OUTPATIENT)
Dept: LAB | Facility: CLINIC | Age: 55
End: 2025-03-31
Payer: COMMERCIAL

## 2025-03-31 DIAGNOSIS — N18.6 END STAGE RENAL DISEASE (MULTI): ICD-10-CM

## 2025-03-31 LAB — FREEZE CROSSMATCH: NORMAL

## 2025-04-02 PROCEDURE — 86832 HLA CLASS I HIGH DEFIN QUAL: CPT | Mod: OUT | Performed by: SURGERY

## 2025-04-02 PROCEDURE — 86833 HLA CLASS II HIGH DEFIN QUAL: CPT | Mod: OUT | Performed by: SURGERY

## 2025-04-08 ENCOUNTER — TELEPHONE (OUTPATIENT)
Facility: HOSPITAL | Age: 55
End: 2025-04-08
Payer: COMMERCIAL

## 2025-04-09 ENCOUNTER — DOCUMENTATION (OUTPATIENT)
Facility: HOSPITAL | Age: 55
End: 2025-04-09
Payer: COMMERCIAL

## 2025-04-09 NOTE — PROGRESS NOTES
Talked to pt.    Let her know we would bring her in for updated testing so we could work on getting her active on the transplant list again.

## 2025-04-15 ENCOUNTER — LAB REQUISITION (OUTPATIENT)
Dept: LAB | Facility: CLINIC | Age: 55
End: 2025-04-15
Payer: COMMERCIAL

## 2025-04-15 DIAGNOSIS — N18.6 END STAGE RENAL DISEASE (MULTI): ICD-10-CM

## 2025-04-15 LAB
HLA RESULTS: NORMAL
HLA-A+B+C AB NFR SER: NORMAL %
HLA-DP+DQ+DR AB NFR SER: NORMAL %

## 2025-04-23 ENCOUNTER — DOCUMENTATION (OUTPATIENT)
Facility: HOSPITAL | Age: 55
End: 2025-04-23
Payer: COMMERCIAL

## 2025-04-23 DIAGNOSIS — Z13.6 ENCOUNTER FOR SCREENING FOR CARDIOVASCULAR DISORDERS: ICD-10-CM

## 2025-04-23 DIAGNOSIS — Z79.899 OTHER LONG TERM (CURRENT) DRUG THERAPY: ICD-10-CM

## 2025-04-23 DIAGNOSIS — Z51.81 ENCOUNTER FOR THERAPEUTIC DRUG LEVEL MONITORING: ICD-10-CM

## 2025-04-23 DIAGNOSIS — N18.6 END STAGE RENAL DISEASE (MULTI): ICD-10-CM

## 2025-04-23 DIAGNOSIS — Z01.818 PRE-TRANSPLANT EVALUATION FOR KIDNEY TRANSPLANT: ICD-10-CM

## 2025-05-07 ENCOUNTER — DOCUMENTATION (OUTPATIENT)
Facility: HOSPITAL | Age: 55
End: 2025-05-07
Payer: COMMERCIAL

## 2025-05-07 PROCEDURE — 86808 CYTOTOXIC ANTIBODY SCREENING: CPT | Mod: OUT | Performed by: SURGERY

## 2025-05-12 ENCOUNTER — DOCUMENTATION (OUTPATIENT)
Facility: HOSPITAL | Age: 55
End: 2025-05-12
Payer: COMMERCIAL

## 2025-05-13 ENCOUNTER — TELEPHONE (OUTPATIENT)
Facility: HOSPITAL | Age: 55
End: 2025-05-13
Payer: COMMERCIAL

## 2025-05-15 ENCOUNTER — APPOINTMENT (OUTPATIENT)
Dept: CARDIOLOGY | Facility: HOSPITAL | Age: 55
End: 2025-05-15
Payer: COMMERCIAL

## 2025-05-15 ENCOUNTER — APPOINTMENT (OUTPATIENT)
Dept: RADIOLOGY | Facility: HOSPITAL | Age: 55
End: 2025-05-15
Payer: COMMERCIAL

## 2025-05-16 ENCOUNTER — HOSPITAL ENCOUNTER (OUTPATIENT)
Dept: RADIOLOGY | Facility: HOSPITAL | Age: 55
Discharge: HOME | End: 2025-05-16
Payer: COMMERCIAL

## 2025-05-16 DIAGNOSIS — Z01.818 PRE-TRANSPLANT EVALUATION FOR KIDNEY TRANSPLANT: ICD-10-CM

## 2025-05-16 PROCEDURE — 74176 CT ABD & PELVIS W/O CONTRAST: CPT

## 2025-05-28 ENCOUNTER — LAB REQUISITION (OUTPATIENT)
Dept: LAB | Facility: CLINIC | Age: 55
End: 2025-05-28
Payer: COMMERCIAL

## 2025-05-28 DIAGNOSIS — N18.6 END STAGE RENAL DISEASE (MULTI): ICD-10-CM

## 2025-05-28 LAB — FREEZE CROSSMATCH: NORMAL

## 2025-06-04 PROCEDURE — 86808 CYTOTOXIC ANTIBODY SCREENING: CPT | Mod: OUT | Performed by: SURGERY

## 2025-06-20 ENCOUNTER — LAB REQUISITION (OUTPATIENT)
Dept: LAB | Facility: CLINIC | Age: 55
End: 2025-06-20
Payer: COMMERCIAL

## 2025-06-20 DIAGNOSIS — N18.6 END STAGE RENAL DISEASE (MULTI): ICD-10-CM

## 2025-06-20 LAB — FREEZE CROSSMATCH: NORMAL

## 2025-06-30 ENCOUNTER — DOCUMENTATION (OUTPATIENT)
Facility: HOSPITAL | Age: 55
End: 2025-06-30
Payer: COMMERCIAL

## 2025-06-30 DIAGNOSIS — N18.6 ESRD (END STAGE RENAL DISEASE) (MULTI): ICD-10-CM

## 2025-06-30 DIAGNOSIS — Z13.6 ENCOUNTER FOR SCREENING FOR CARDIOVASCULAR DISORDERS: ICD-10-CM

## 2025-06-30 DIAGNOSIS — Z51.81 ENCOUNTER FOR THERAPEUTIC DRUG LEVEL MONITORING: ICD-10-CM

## 2025-06-30 DIAGNOSIS — Z01.818 PRE-TRANSPLANT EVALUATION FOR KIDNEY TRANSPLANT: ICD-10-CM

## 2025-06-30 DIAGNOSIS — E11.69 TYPE 2 DIABETES MELLITUS WITH OTHER SPECIFIED COMPLICATION, UNSPECIFIED WHETHER LONG TERM INSULIN USE (MULTI): ICD-10-CM

## 2025-07-03 ENCOUNTER — TELEPHONE (OUTPATIENT)
Facility: HOSPITAL | Age: 55
End: 2025-07-03
Payer: COMMERCIAL

## 2025-07-07 PROCEDURE — 86833 HLA CLASS II HIGH DEFIN QUAL: CPT | Mod: OUT | Performed by: SURGERY

## 2025-07-08 ENCOUNTER — OTHER (OUTPATIENT)
Facility: HOSPITAL | Age: 55
End: 2025-07-08
Payer: COMMERCIAL

## 2025-07-08 ENCOUNTER — DOCUMENTATION (OUTPATIENT)
Dept: TRANSPLANT | Facility: HOSPITAL | Age: 55
End: 2025-07-08
Payer: COMMERCIAL

## 2025-07-08 NOTE — PROGRESS NOTES
"Spoke to pt on phone for virtual a/r apt ([phone]) re benefits review; [employer] dual policy active. Pt understands her AR meds will be covered by Medicare part B at 80%. Discussed out of pocket expense if Medicare A/B & D only in regards to ded, co-ins & co-pays. Discussed Part D coverage.  pt is receiving the \"extra help\". Active with [facility] Medicaid.Discussed importance of [place]s [place] . Is participating with SSDI ticket to work program. Has dual eligibility for SSDI. All info on file verified & updated.  "

## 2025-07-10 ENCOUNTER — DOCUMENTATION (OUTPATIENT)
Facility: HOSPITAL | Age: 55
End: 2025-07-10
Payer: COMMERCIAL

## 2025-07-10 NOTE — PROGRESS NOTES
Talked to Dr. Landers, pt was in for a clinic appt.    Updated him on her status - she is currently inactive d/t having a TMA in Feb.  She is coming in for updates and will be done by Aug 5 and hope to get her reactivated at that time.

## 2025-07-16 ENCOUNTER — LAB REQUISITION (OUTPATIENT)
Dept: LAB | Facility: CLINIC | Age: 55
End: 2025-07-16
Payer: COMMERCIAL

## 2025-07-16 ENCOUNTER — OFFICE VISIT (OUTPATIENT)
Dept: CARDIOLOGY | Facility: CLINIC | Age: 55
End: 2025-07-16
Payer: COMMERCIAL

## 2025-07-16 VITALS
SYSTOLIC BLOOD PRESSURE: 141 MMHG | WEIGHT: 181.6 LBS | OXYGEN SATURATION: 96 % | BODY MASS INDEX: 28.5 KG/M2 | HEART RATE: 93 BPM | HEIGHT: 67 IN | DIASTOLIC BLOOD PRESSURE: 80 MMHG

## 2025-07-16 DIAGNOSIS — I10 ESSENTIAL HYPERTENSION: Primary | ICD-10-CM

## 2025-07-16 DIAGNOSIS — N18.6 END STAGE RENAL DISEASE (MULTI): ICD-10-CM

## 2025-07-16 LAB
HLA RESULTS: NORMAL
HLA-A+B+C AB NFR SER: NORMAL %
HLA-DP+DQ+DR AB NFR SER: NORMAL %

## 2025-07-16 PROCEDURE — 3008F BODY MASS INDEX DOCD: CPT | Performed by: INTERNAL MEDICINE

## 2025-07-16 PROCEDURE — 3078F DIAST BP <80 MM HG: CPT | Performed by: INTERNAL MEDICINE

## 2025-07-16 PROCEDURE — 99214 OFFICE O/P EST MOD 30 MIN: CPT | Performed by: INTERNAL MEDICINE

## 2025-07-16 PROCEDURE — G2211 COMPLEX E/M VISIT ADD ON: HCPCS | Performed by: INTERNAL MEDICINE

## 2025-07-16 PROCEDURE — 3074F SYST BP LT 130 MM HG: CPT | Performed by: INTERNAL MEDICINE

## 2025-07-16 PROCEDURE — 93005 ELECTROCARDIOGRAM TRACING: CPT | Performed by: INTERNAL MEDICINE

## 2025-07-16 PROCEDURE — 99212 OFFICE O/P EST SF 10 MIN: CPT

## 2025-07-16 PROCEDURE — 1036F TOBACCO NON-USER: CPT | Performed by: INTERNAL MEDICINE

## 2025-07-16 ASSESSMENT — ENCOUNTER SYMPTOMS
DEPRESSION: 0
OCCASIONAL FEELINGS OF UNSTEADINESS: 0
LOSS OF SENSATION IN FEET: 1

## 2025-07-16 ASSESSMENT — PAIN SCALES - GENERAL: PAINLEVEL_OUTOF10: 10-WORST PAIN EVER

## 2025-07-16 NOTE — PROGRESS NOTES
Primary Care Physician: Perfecto Mcghee MD  Date of Visit: 07/16/2025  3:00 PM EDT  [place] [facility] [place] [address]     Chief Complaint:   Chief Complaint   Patient presents with    Follow-up     Numbness and tingling in hands and arm     New cardiology consultation.  Chief complaint shortness of breath.  Recent labs and clinical course reviewed  HPI / Summary:   Rody Espinosa is a 54 y.o. female presents for new cardiovascular evaluation. No ref. provider found I had last seen in September 2020 for  ESRD 2/2 DM, history of CAD, status post RCA stent in the setting of ACS in 2006, GERD, DL, PAD, history of osteo myelitis with right foot TMA in December 2020 for delayed primary closure December 9, 2024 admitted in January for soft tissue swelling and infection  Most of care through the [facility] system  Cardiac catheterization May 2023 showed mild nonobstructive CAD with patent RCA stent    Toe amputation in December Debridement  In January.    No CP, RLS  Tolerating dialysis  Left arm edema  Specialty Problems          Cardiology Problems    MI (myocardial infarction) (Multi)    Last Assessment & Plan: Formatting of this note might be different from the original. Assessment: History of MI with 2 Cardiac stents. Takes Daily Aspirin. Denies any current CP, Heart Palpitations or SOB.         Superior vena cava compression syndrome    AV fistula stenosis    Hemodialysis-associated hypotension    Dialysis AV fistula malfunction    AVF (arteriovenous fistula)    Benign essential hypertension    Coronary artery disease involving native coronary artery of native heart without angina pectoris    Essential hypertension    Last Assessment & Plan: Formatting of this note might be different from the original. Resume home BP medications         Hyperlipidemia    Peripheral vascular disease    Venous thrombosis    History of cardiovascular surgery    Preop cardiovascular exam    Occlusion of arteriovenous dialysis  "graft    Peripheral vascular disease, unspecified        Medical History[1]       Surgical History[2]       Social History:   reports that she has never smoked. She has never used smokeless tobacco. She reports current alcohol use. She reports that she does not use drugs.      Allergies:  RX Allergies[3]    Outpatient Medications:  Current Outpatient Medications   Medication Instructions    acetaminophen (TYLENOL) 650 mg, Every 4 hours PRN    aspirin 81 mg, Daily RT    atorvastatin (LIPITOR) 40 mg, oral, Nightly    cetirizine (ZYRTEC) 5 mg, Nightly    clobetasol (Temovate) 0.05 % ointment 1 Application, 2 times daily PRN    glipiZIDE (GLUCOTROL) 5 mg, 2 times daily    ketoconazole (NIZOral) 2 % shampoo 1 Application, Every 14 days    metoprolol succinate XL (TOPROL-XL) 25 mg, oral, Daily    Nephron FA 66 mg iron- 1,000 mcg tablet 1 tablet, oral, Daily    omeprazole (PRILOSEC) 40 mg, 2 times daily    ondansetron (Zofran) 4 mg tablet Every 8 hours PRN    oxyCODONE (ROXICODONE) 5 mg, oral, Every 4 hours PRN    polyethylene glycol (GLYCOLAX, MIRALAX) 17 g, Daily PRN    pregabalin (Lyrica) 25 mg capsule 1-4 capsules, Nightly PRN    sevelamer carbonate (Renvela) 800 mg tablet 3 tablets, oral, 3 times daily (morning, midday, late afternoon)    timolol (Timoptic) 0.5 % ophthalmic solution 1 drop, 2 times daily    triamcinolone (Kenalog) 0.1 % ointment 1 Application, Every 48 hours PRN       ROS     Physical Exam:  Vitals:    07/16/25 1514 07/16/25 1544   BP: 85/51 141/80   BP Location: Left leg Left leg   Patient Position: Lying Sitting   BP Cuff Size: Adult Adult   Pulse: 93    SpO2: 96%    Weight: 82.4 kg (181 lb 9.6 oz)    Height: 1.689 m (5' 6.5\")      Wt Readings from Last 5 Encounters:   07/16/25 82.4 kg (181 lb 9.6 oz)   12/03/24 84.5 kg (186 lb 4.6 oz)   12/02/24 84.5 kg (186 lb 4.6 oz)   09/13/24 84.9 kg (187 lb 4 oz)   02/21/24 85.6 kg (188 lb 12.8 oz)     Body mass index is 28.87 kg/m².   Physical Exam " "  Distress.  Difficult to  neck pains.  Regular rhythm.  Clear lungs.  Soft abdomen.  Swollen right arm, well-healed scar in her right foot.  No erythema drainage.  Poor distal pulses  Last Labs:  CMP:  Recent Labs     12/06/24 0627 12/05/24  0559 12/04/24  0554 12/03/24  0629 12/02/24  0545    141 138 137 140   K 4.2 4.2 5.2 4.8 5.5*   CL 98 99 96* 94* 98   CO2 33* 29 27 31 27   ANIONGAP 16 17 20 17 21*   BUN 28* 18 43* 21 49*   CREATININE 11.08* 7.48* 12.64* 9.34* 14.67*   EGFR 4* 6* 3* 5* 3*   GLUCOSE 84 95 124* 115* 76     Recent Labs     12/06/24  0627 12/05/24  0559 12/04/24  0554 12/03/24  0629 12/01/24  1509 11/29/24  1843 11/29/24  1843 11/16/22  1240   ALBUMIN 3.6 3.6 3.6 3.7 3.4   < > 4.0 4.3   ALKPHOS  --   --   --   --   --   --  66 72   ALT  --   --   --   --   --   --  8 20   AST  --   --   --   --   --   --  7* 16   BILITOT  --   --   --   --   --   --  0.6 0.9    < > = values in this interval not displayed.     CBC:  Recent Labs     12/06/24 0627 12/05/24 0559 12/04/24  0554 12/03/24  0629 12/02/24  0545   WBC 8.4 7.8 8.0 8.3 9.2   HGB 9.6* 10.1* 10.8* 10.2* 10.3*   HCT 27.6* 29.0* 29.8* 29.5* 30.3*    172 196 174 177   MCV 88 90 87 88 90     COAG:   Recent Labs     12/04/24  0554 12/03/24  0629 04/04/23  1136   INR 1.1 1.1 1.0     HEME/ENDO:  Recent Labs     12/09/24  0555 11/29/24  1843 11/16/22  1240 03/15/22  0902   HGBA1C 5.6 5.7* 5.8* 6.4*      CARDIAC: No results for input(s): \"LDH\", \"CKMB\", \"TROPHS\", \"BNP\" in the last 42900 hours.    No lab exists for component: \"CK\", \"CKMBP\"No results for input(s): \"CHOL\", \"LDLF\", \"HDL\", \"TRIG\" in the last 04242 hours.    Last Cardiology Tests:  ECG:      Echo:  Echo Results:  Transthoracic Echo (TTE) Complete With Contrast 01/19/2024    Narrative  TRANSTHORACIC ECHOCARDIOGRAM REPORT      Patient Name:      FERNANDO HERNANDEZ   Reading Physician:    [id number] Solomon Doe MD  Study Date:        1/19/2024            Ordering Provider:    " [id number] ETIENNE HU  MRN/PID:           [id number]             Fellow:  Accession#:        [id number]         Nurse:                Cindi Ruff RN  Date of Birth/Age: 1970 / 53 years Sonographer:          VIOLETA Jacobson RDCS  Gender:            F                    Additional Staff:  Height:            170.18 cm            Admit Date:  Weight:            87.09 kg             Admission Status:     Outpatient  BSA:               1.99 m2              Encounter#:           [id number]  Department Location:  [facility]  Blood Pressure: 140 /74 mmHg    Study Type:    TRANSTHORACIC ECHO (TTE) COMPLETE  Diagnosis/ICD: Encounter for preprocedural cardiovascular examination-Z01.810  Indication:    Pre-kidney transplant evaluation  CPT Code:      Echo Complete w Full Doppler-45738    Patient History:  Drug Allergies:    None  Pertinent History: ESRD s/p tx , DMII, Htn, HLD, failed tx w/ current HD.    Study Detail: The following Echo studies were performed: 2D, M-Mode, Doppler and  color flow. Image quality for this study is less than ideal.  Definity used as a contrast agent for endocardial border  definition. Total contrast used for this procedure was 3 mL via IV  push. The patient was awake.      PHYSICIAN INTERPRETATION:  Left Ventricle: The left ventricular systolic function is normal, with an estimated ejection fraction of 65%. There are no regional wall motion abnormalities. The left ventricular cavity size is normal. Spectral Doppler shows a normal pattern of left ventricular diastolic filling.  Left Atrium: The left atrium is normal in size.  Right Ventricle: The right ventricle is normal in size. There is normal right ventricular global systolic function.  Right Atrium: The right atrium is normal in size.  Aortic Valve: The aortic valve is trileaflet. There is no evidence of aortic valve regurgitation. The peak instantaneous gradient of the aortic valve is 4.2 mmHg.  Mitral Valve: The  mitral valve is normal in structure. There is trace mitral valve regurgitation.  Tricuspid Valve: The tricuspid valve is structurally normal. There is trace to mild tricuspid regurgitation. The Doppler estimated RVSP is within normal limits at 26.1 mmHg.  Pulmonic Valve: The pulmonic valve is structurally normal. There is trace pulmonic valve regurgitation.  Pericardium: There is no pericardial effusion noted.  Aorta: The aortic root is normal.  In comparison to the previous echocardiogram(s): Compared with study from 11/16/2022, no significant change.      CONCLUSIONS:  1. Left ventricular systolic function is normal with a 65% estimated ejection fraction.  2. RVSP within normal limits.    QUANTITATIVE DATA SUMMARY:  2D MEASUREMENTS:  Normal Ranges:  LAs:           3.40 cm   (2.7-4.0cm)  RVIDd:         1.80 cm   (0.9-3.6cm)  IVSd:          1.13 cm   (0.6-1.1cm)  LVPWd:         1.05 cm   (0.6-1.1cm)  LVIDd:         3.96 cm   (3.9-5.9cm)  LVIDs:         2.52 cm  LV Mass Index: 71.1 g/m2  LV % FS        36.3 %    LA VOLUME:  Normal Ranges:  LA Vol A4C:        47.6 ml    (22+/-6mL/m2)  LA Vol A2C:        52.4 ml  LA Vol BP:         52.2 ml  LA Vol Index A4C:  24.0ml/m2  LA Vol Index A2C:  26.4 ml/m2  LA Vol Index BP:   26.3 ml/m2  LA Area A4C:       15.7 cm2  LA Area A2C:       17.2 cm2  LA Major Axis A4C: 4.4 cm  LA Major Axis A2C: 4.8 cm    RA VOLUME BY A/L METHOD:  Normal Ranges:  RA Vol A4C:        26.3 ml    (8.3-19.5ml)  RA Vol Index A4C:  13.2 ml/m2  RA Area A4C:       11.4 cm2  RA Major Axis A4C: 4.2 cm    AORTA MEASUREMENTS:  Normal Ranges:  AoV Meghan,s: 3.00 cm (1.4-2.6cm)  Asc Ao, d: 2.40 cm (2.1-3.4cm)    LV SYSTOLIC FUNCTION BY 2D PLANIMETRY (MOD):  Normal Ranges:  EF-A4C View: 68.3 % (>=55%)  EF-A2C View: 65.3 %  EF-Biplane:  66.1 %    LV DIASTOLIC FUNCTION:  Normal Ranges:  MV Peak E:        0.74 m/s   (0.7-1.2 m/s)  MV Peak A:        0.68 m/s   (0.42-0.7 m/s)  E/A Ratio:        1.08       (1.0-2.2)  MV e'              0.07 m/s   (>8.0)  MV lateral e'     0.07 m/s  MV medial e'      0.06 m/s  E/e' Ratio:       10.58      (<8.0)  a'                0.10 m/s  PulmV Sys Jaiden:    52.72 cm/s  PulmV Brown Jaiden:   41.06 cm/s  PulmV S/D Jaiden:    1.28  PulmV A Revs Jaiden: 33.40 cm/s  PulmV A Revs Dur: 99.19 msec    MITRAL VALVE:  Normal Ranges:  MV DT: 152 msec (150-240msec)    AORTIC VALVE:  Normal Ranges:  AoV Vmax:      1.03 m/s (<=1.7m/s)  AoV Peak P.2 mmHg (<20mmHg)  LVOT Max Jaiden:  0.91 m/s (<=1.1m/s)  LVOT VTI:      22.32 cm  LVOT Diameter: 2.34 cm  (1.8-2.4cm)  AoV Area,Vmax: 3.81 cm2 (2.5-4.5cm2)      RIGHT VENTRICLE:  RV Basal 3.40 cm  RV Mid   1.80 cm  RV Major 6.8 cm  TAPSE:   20.0 mm  RV s'    0.10 m/s    TRICUSPID VALVE/RVSP:  Normal Ranges:  Peak TR Velocity: 2.40 m/s  RV Syst Pressure: 26.1 mmHg (< 30mmHg)    PULMONIC VALVE:  Normal Ranges:  PV Max Jaiden: 0.9 m/s  (0.6-0.9m/s)  PV Max PG:  3.0 mmHg    Pulmonary Veins:  PulmV A Revs Dur: 99.19 msec  PulmV A Revs Jaiden: 33.40 cm/s  PulmV Brown Jaiden:   41.06 cm/s  PulmV S/D Jaiden:    1.28  PulmV Sys Jaiden:    52.72 cm/s    AORTA:  Asc Ao Diam 2.45 cm      [id number] Solomon Doe MD  Electronically signed on 2024 at 1:05:52 PM        ** Final **       Cath:      Stress Test:  Stress Results:  No results found for this or any previous visit from the past 365 days.         Cardiac Imaging:  CT abdomen pelvis wo IV contrast  Narrative: Interpreted By:  Doe Alfonso,  and Kahlil Brice   STUDY:  CT ABDOMEN PELVIS WO IV CONTRAST;  2025 2:45 pm      INDICATION:  Signs/Symptoms:assess vessels for prekidney recipient transplant  evaluation.      ,Z01.818 Encounter for other preprocedural examination      COMPARISON:  CT abdomen/pelvis 2017      ACCESSION NUMBER(S):  [id number]      ORDERING CLINICIAN:  ETIENNE HU      TECHNIQUE:  CT of the abdomen and pelvis was performed. Contiguous axial images  were obtained at 3 mm slice thickness through the abdomen and  pelvis.  Coronal and sagittal reconstructions at 3 mm slice thickness were  performed.  No intravenous contrast was administered.      FINDINGS:  Please note that the evaluation of vessels, lymph nodes and organs is  limited without intravenous contrast.      LOWER CHEST:  The visualized lung base is unremarkable. The heart is normal in size  without evidence of pericardial effusion. No pleural effusion is  present. Visualized distal esophagus appears normal.      ABDOMEN:      LIVER:  The liver is normal in size without evidence of focal liver lesions.      BILE DUCTS:  The intrahepatic and extrahepatic ducts are not dilated.      GALLBLADDER:  No calcified stones. No wall thickening.      PANCREAS:  The pancreas appears unremarkable without evidence of ductal  dilatation or masses.      SPLEEN:  Within normal limits.      ADRENAL GLANDS:  0.9 cm right adrenal myelolipoma.      KIDNEYS AND URETERS:  Atrophy of the bilateral native kidneys. There are increased number  and size of multiple cystic lesions over remote priors, which are  incompletely characterized on noncontrast examination. These most  likely represent cysts, with note made of a probable  proteinaceous/hemorrhagic cyst. No suspicious lesions are evident. No  hydroureteronephrosis or nephroureterolithiasis is identified.      PELVIS:      BLADDER:  Bladder is decompressed, limited for evaluation.      REPRODUCTIVE ORGANS:  Status post hysterectomy.      BOWEL:  The stomach, and small bowel are normal in appearance without wall  thickening or obstruction. Colonic diverticulosis without evident  acute diverticulitis. The appendix appears normal.      VESSELS:  Moderate atheromatous changes of the abdominal aorta and its  branches. In addition, there is medial calcification of the arterial  vessels. More specifically, the common iliac arteries demonstrate  moderate atheromatous calcification. The external iliac arteries  demonstrate mild atheromatous  calcification, right-greater-than-left.  The internal iliac arteries demonstrate moderate atheromatous  calcification.      There is normal arterial and venous anatomy with prominence of the  right great saphenous vein and partially visualized arteriovenous  fistula graft arising from the right SFA.      Wall calcification of the IVC and right common iliac vein. Abdominal  venous collaterals which terminate at the level of the junction of  the left great saphenous vein and left femoral vein.      PERITONEUM/RETROPERITONEUM/LYMPH NODES:  There is no free or loculated fluid collection, no free  intraperitoneal air. Calcified transplant kidney within the right  iliac fossa. No abdominopelvic lymphadenopathy is present.      ABDOMINAL WALL:  Extensive abdominal venous collaterals.      BONES:  Mild diffuse increase in osseous density which can be seen in the  setting of renal osteodystrophy.      Impression: 1. No acute abdominopelvic abnormality.  2. Vascular anatomy as above with note made of a partially visualized  arteriovenous fistula graft as well as extensive abdominal venous  collaterals which terminate at the level of the left great saphenous  vein and left femoral vein. Evaluation for patency of the abdominal  vessels, including the graft, is limited on noncontrast examination.  3. Additional chronic findings as above.      I personally reviewed the images/study and I agree with the resident  findings as stated by Babs Guillory MD. This study was interpreted at  [facility], [place], [place].      MACRO:  None      Signed by: Doe Alfonso 5/16/2025 5:32 PM  Dictation workstation:   [id number]        Assessment/Plan   She has a pretty adverse antibiotic profile so I am not sure she would actually be a transplant candidate.  She was found on cath in 2023 to have nonobstructive disease with a patent RCA stent.  After she sees transplant if they truly feel she is a  candidate I would recommend an ischemic evaluation with a stress CMR rather than a nuclear scan.  She is also scheduled for an echo.  I will see her back in 12 months sooner if symptoms warrant thank you for allowing me to participate in her care        Orders:  No orders of the defined types were placed in this encounter.     Followup Appts:  Future Appointments   Date Time Provider Department Center   8/5/2025  9:00 AM [facility] ECHO 2 [id number] [facility]   8/5/2025 10:00 AM [facility] [place] [id number] CR NONV1 HOLTER/ECG RESOURCE [id number] [facility] [place] [facility]   8/5/2025 10:30 AM TXP ABDOMINAL SURGEON [id number]   8/5/2025 11:00 AM LILLIAN Benavides [id number]           ____________________________________________________________  Kenny Barrett MD    Senior Attending Physician  [facility]  [facility]         [1]   Past Medical History:  Diagnosis Date    Allergic purpura     HSP (Henoch-Schonlein purpura) nephritis    Benign neoplasm of connective and other soft tissue, unspecified     Fibroid tumor    Compression of vein 11/16/2022    SVC syndrome    End stage renal disease (Multi)     ESRD (end stage renal disease)    Other conditions influencing health status     Acute Myocardial Infarction    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HHS-HCC)     Streptococcal meningitis   [2]   Past Surgical History:  Procedure Laterality Date    HYSTEROSCOPY  05/15/2013    Hysteroscopy With Endometrial Ablation    OTHER SURGICAL HISTORY  05/15/2013    Renal Transplant    OTHER SURGICAL HISTORY  05/15/2013    Parathyroid Resection Sub-Total Parathyroidectomy    OTHER SURGICAL HISTORY  05/15/2013    Parathyroid Surgery    OTHER SURGICAL HISTORY  05/15/2013    Repair Of Brow Ptosis Right Upper Eyelid    OTHER SURGICAL HISTORY  05/15/2013    Cardiac Cath Lesion 1, 1st Adjunct Treat Device: Stent    OTHER SURGICAL HISTORY  05/15/2013    Biopsy Renal    OTHER SURGICAL HISTORY   10/25/2018    Hemodialysis Access Type Arteriovenous Graft Right Thigh    OTHER SURGICAL HISTORY  10/25/2018    Hemodialysis Access Type Arteriovenous Fistula Left Arm    OTHER SURGICAL HISTORY  10/22/2014    Percutaneous Transluminal Angioplasty (Non-Coronary)   [3]   Allergies  Allergen Reactions    Betadine [Povidone-Iodine] Unknown    Iodinated Contrast Media Itching    Adhesive Tape-Silicones Rash    Iodine Rash     Mild rash

## 2025-07-18 LAB
ATRIAL RATE: 93 BPM
P AXIS: 68 DEGREES
P OFFSET: 190 MS
P ONSET: 138 MS
PR INTERVAL: 160 MS
Q ONSET: 218 MS
QRS COUNT: 16 BEATS
QRS DURATION: 80 MS
QT INTERVAL: 366 MS
QTC CALCULATION(BAZETT): 455 MS
QTC FREDERICIA: 423 MS
R AXIS: 40 DEGREES
T AXIS: 53 DEGREES
T OFFSET: 401 MS
VENTRICULAR RATE: 93 BPM

## 2025-08-01 ENCOUNTER — TELEPHONE (OUTPATIENT)
Facility: HOSPITAL | Age: 55
End: 2025-08-01
Payer: COMMERCIAL

## 2025-08-04 ENCOUNTER — DOCUMENTATION (OUTPATIENT)
Facility: HOSPITAL | Age: 55
End: 2025-08-04
Payer: COMMERCIAL

## 2025-08-04 NOTE — PROGRESS NOTES
Kidney Patient Summary      Reason for appt:  WL status 7 due to osteo (2024) and eventual TMA    Date of appointment: 2025    Name: Rody Espinosa    : 1970    MRN: [id number]    Diagnosis: Retransplant/HSP    ABO:   ABO TYPE (no units)   Date Value   2024 O        Phase: Waitlist  Status: Inactive    Center Waitlist Date: 2008     Last Seen:   Referring Nephrologist:       HD Unit: [facility] DIALYSIS [facility]   Dialysis Start:   Access:       Days:      PCP: Perfecto Mcghee MD       Endocrinologist:     BMI Readings from Last 1 Encounters:   25 28.87 kg/m²     Blood Transfusion:    Medical History/Hospitalizations: Medical History[1]        Diagnosis Date    Allergic purpura       HSP (Henoch-Schonlein purpura) nephritis    Benign neoplasm of connective and other soft tissue, unspecified       Fibroid tumor    Compression of vein 2022     SVC syndrome    End stage renal disease (Multi)       ESRD (end stage renal disease)    Other conditions influencing health status       Acute Myocardial Infarction    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[facility])       Streptococcal meningitis      Surgical History: Surgical History[2]    Past Surgical History  as of 2025 4:34 PM        Procedure Laterality Date    HYSTEROSCOPY   05/15/2013     Hysteroscopy With Endometrial Ablation    OTHER SURGICAL HISTORY   05/15/2013     Renal Transplant    OTHER SURGICAL HISTORY   05/15/2013     Parathyroid Resection Sub-Total Parathyroidectomy    OTHER SURGICAL HISTORY   05/15/2013     Parathyroid Surgery    OTHER SURGICAL HISTORY   05/15/2013     Repair Of Brow Ptosis Right Upper Eyelid    OTHER SURGICAL HISTORY   05/15/2013     Cardiac Cath Lesion 1, 1st Adjunct Treat Device: Stent    OTHER SURGICAL HISTORY   05/15/2013     Biopsy Renal    OTHER SURGICAL HISTORY   10/25/2018     Hemodialysis Access Type Arteriovenous Graft Right Thigh    OTHER SURGICAL HISTORY   10/25/2018      Hemodialysis Access Type Arteriovenous Fistula Left Arm    OTHER SURGICAL HISTORY   10/22/2014     Percutaneous Transluminal Angioplasty (Non-Coronary)     Donors: No    Staff:  Nephrologist:    Surgeon: River    : Sofia     Testing Date: Needs updated      Test/Consult Impression Next Scheduled Date   CXR No results found for this or any previous visit.    EKG ECG 12 lead (Clinic Performed) 7/16/2025    Narrative  Normal sinus rhythm  Normal ECG  When compared with ECG of 05-FEB-2024 15:03,  No significant change was found  Confirmed by Kenny Barrett ([id number]) on 7/18/2025 8:18:40 AM        Echo TRANSTHORACIC ECHOCARDIOGRAM REPORT      Patient Name:      FERNANDO CRUZ MARY   Reading Physician:    [id number] Solomon Doe MD  Study Date:        1/19/2024            Ordering Provider:    [id number] ETIENNE HU  MRN/PID:           [id number]             Fellow:  Accession#:        [id number]         Nurse:                Cindi Ruff RN  Date of Birth/Age: 1970 / 53 years Sonographer:          VIOLETA Jacobson RDCS  Gender:            F                    Additional Staff:  Height:            170.18 cm            Admit Date:  Weight:            87.09 kg             Admission Status:     Outpatient  BSA:               1.99 m2              Encounter#:           [id number]  Department Location:  [facility] Non  Invasive  Blood Pressure: 140 /74 mmHg    Study Type:    TRANSTHORACIC ECHO (TTE) COMPLETE  Diagnosis/ICD: Encounter for preprocedural cardiovascular examination-Z01.810  Indication:    Pre-kidney transplant evaluation  CPT Code:      Echo Complete w Full Doppler-[id number]    Patient History:  Drug Allergies:    None  Pertinent History: ESRD s/p tx , DMII, Htn, HLD, failed tx w/ current HD.    Study Detail: The following Echo studies were performed: 2D, M-Mode, Doppler and  color flow. Image quality for this study is less than ideal.  Definity used as a contrast agent for endocardial  border  definition. Total contrast used for this procedure was 3 mL via IV  push. The patient was awake.      PHYSICIAN INTERPRETATION:  Left Ventricle: The left ventricular systolic function is normal, with an estimated ejection fraction of 65%. There are no regional wall motion abnormalities. The left ventricular cavity size is normal. Spectral Doppler shows a normal pattern of left ventricular diastolic filling.  Left Atrium: The left atrium is normal in size.  Right Ventricle: The right ventricle is normal in size. There is normal right ventricular global systolic function.  Right Atrium: The right atrium is normal in size.  Aortic Valve: The aortic valve is trileaflet. There is no evidence of aortic valve regurgitation. The peak instantaneous gradient of the aortic valve is 4.2 mmHg.  Mitral Valve: The mitral valve is normal in structure. There is trace mitral valve regurgitation.  Tricuspid Valve: The tricuspid valve is structurally normal. There is trace to mild tricuspid regurgitation. The Doppler estimated RVSP is within normal limits at 26.1 mmHg.  Pulmonic Valve: The pulmonic valve is structurally normal. There is trace pulmonic valve regurgitation.  Pericardium: There is no pericardial effusion noted.  Aorta: The aortic root is normal.  In comparison to the previous echocardiogram(s): Compared with study from 11/16/2022, no significant change.      CONCLUSIONS:  1. Left ventricular systolic function is normal with a 65% estimated ejection fraction.  2. RVSP within normal limits.    QUANTITATIVE DATA SUMMARY:  2D MEASUREMENTS:  Normal Ranges:  LAs:           3.40 cm   (2.7-4.0cm)  RVIDd:         1.80 cm   (0.9-3.6cm)  IVSd:          1.13 cm   (0.6-1.1cm)  LVPWd:         1.05 cm   (0.6-1.1cm)  LVIDd:         3.96 cm   (3.9-5.9cm)  LVIDs:         2.52 cm  LV Mass Index: 71.1 g/m2  LV % FS        36.3 %    LA VOLUME:  Normal Ranges:  LA Vol A4C:        47.6 ml    (22+/-6mL/m2)  LA Vol A2C:        52.4 ml  LA  Vol BP:         52.2 ml  LA Vol Index A4C:  24.0ml/m2  LA Vol Index A2C:  26.4 ml/m2  LA Vol Index BP:   26.3 ml/m2  LA Area A4C:       15.7 cm2  LA Area A2C:       17.2 cm2  LA Major Axis A4C: 4.4 cm  LA Major Axis A2C: 4.8 cm    RA VOLUME BY A/L METHOD:  Normal Ranges:  RA Vol A4C:        26.3 ml    (8.3-19.5ml)  RA Vol Index A4C:  13.2 ml/m2  RA Area A4C:       11.4 cm2  RA Major Axis A4C: 4.2 cm    AORTA MEASUREMENTS:  Normal Ranges:  AoV Meghan,s: 3.00 cm (1.4-2.6cm)  Asc Ao, d: 2.40 cm (2.1-3.4cm)    LV SYSTOLIC FUNCTION BY 2D PLANIMETRY (MOD):  Normal Ranges:  EF-A4C View: 68.3 % (>=55%)  EF-A2C View: 65.3 %  EF-Biplane:  66.1 %    LV DIASTOLIC FUNCTION:  Normal Ranges:  MV Peak E:        0.74 m/s   (0.7-1.2 m/s)  MV Peak A:        0.68 m/s   (0.42-0.7 m/s)  E/A Ratio:        1.08       (1.0-2.2)  MV e'             0.07 m/s   (>8.0)  MV lateral e'     0.07 m/s  MV medial e'      0.06 m/s  E/e' Ratio:       10.58      (<8.0)  a'                0.10 m/s  PulmV Sys Jaiden:    52.72 cm/s  PulmV Brown Jaiden:   41.06 cm/s  PulmV S/D Jaiden:    1.28  PulmV A Revs Jaiden: 33.40 cm/s  PulmV A Revs Dur: 99.19 msec    MITRAL VALVE:  Normal Ranges:  MV DT: 152 msec (150-240msec)    AORTIC VALVE:  Normal Ranges:  AoV Vmax:      1.03 m/s (<=1.7m/s)  AoV Peak P.2 mmHg (<20mmHg)  LVOT Max Jaiden:  0.91 m/s (<=1.1m/s)  LVOT VTI:      22.32 cm  LVOT Diameter: 2.34 cm  (1.8-2.4cm)  AoV Area,Vmax: 3.81 cm2 (2.5-4.5cm2)      RIGHT VENTRICLE:  RV Basal 3.40 cm  RV Mid   1.80 cm  RV Major 6.8 cm  TAPSE:   20.0 mm  RV s'    0.10 m/s    TRICUSPID VALVE/RVSP:  Normal Ranges:  Peak TR Velocity: 2.40 m/s  RV Syst Pressure: 26.1 mmHg (< 30mmHg)    PULMONIC VALVE:  Normal Ranges:  PV Max Jaiden: 0.9 m/s  (0.6-0.9m/s)  PV Max PG:  3.0 mmHg    Pulmonary Veins:  PulmV A Revs Dur: 99.19 msec  PulmV A Revs Jaiden: 33.40 cm/s  PulmV Brown Jaiden:   41.06 cm/s  PulmV S/D Jaiden:    1.28  PulmV Sys Jaiden:    52.72 cm/s    AORTA:  Asc Ao Diam 2.45 cm      [id number] Solomon Doe  MD  Electronically signed on 1/19/2024 at 1:05:52 PM        ** Final **     CT Cardiac Score No results found.     NM Stress Test MRN: [id number]  Patient Name: FERNANDO HERNANDEZ    STUDY:  CARDIAC STRESS/REST INJECTION; PART 2 STRESS OR REST (NO CHARGE);  CARDIAC STRESS/REST (MYOCARDIAL PERFUSION/MIBI);  11/16/2022 12:03 pm    INDICATION:  listed for kidney transplant  Z01.818: Pre-transplant evaluation for  kidney transplant.    COMPARISON:  11/17/2021.    ACCESSION NUMBER(S):  [id number]; [id number]; [id number]    ORDERING CLINICIAN:  SAUL SCHMID    TECHNIQUE:  DIVISION OF NUCLEAR MEDICINE  PHARMACOLOGIC STRESS MYOCARDIAL PERFUSION SCAN, ONE DAY PROTOCOL    The patient received an intravenous dose of  8.1 mCi of Tc-99m  Myoview and resting emission tomographic (SPECT) images of the  myocardium were acquired. The patient then received an intravenous  infusion of 0.4mg regadenoson (Lexiscan)  followed by an additional  dose of  25.5 mCi of Tc-99m  Myoview. Stress phase SPECT images of  the myocardium were then acquired. These included ECG-gated images to  assess and quantify ventricular function.  A low-dose, nondiagnostic  regional CT was utilized for attenuation correction purposes.    FINDINGS:  Both stress and rest studies demonstrate grossly normal perfusion  throughout the left ventricle.    The left ventricle is normal in size.    Gated images demonstrate normal LV wall motion with an LV EF  estimated at greater than 65% on the stress images.    Attenuation correction CT images demonstrate no gross anatomic  abnormalities.  1.  Normal myocardial perfusion study without evidence of inducible  ischemia or prior infarction. Findings similar to prior study.  2. The left ventricle is normal in size.  3. Normal LV wall motion with an LV EF estimated at greater than 65%.      I personally reviewed the images/study and I agree with the findings  as stated. This study was interpreted at [facility]  [place], [place], [place].     Cardiac MRI      Left Heart Catheterization      Cardiology last visit impression  Assessment/Plan   She has a pretty adverse antibiotic profile so I am not sure she would actually be a transplant candidate.  She was found on cath in 2023 to have nonobstructive disease with a patent RCA stent.  After she sees transplant if they truly feel she is a candidate I would recommend an ischemic evaluation with a stress CMR rather than a nuclear scan.  She is also scheduled for an echo.  I will see her back in 12 months sooner if symptoms warrant thank you for allowing me to participate in her care    07/16/2025 Dr. Barrett    Pulmonary Function Test     CT Abd/Pelvis CT abdomen pelvis wo IV contrast  Result Date: 5/16/2025  1. No acute abdominopelvic abnormality. 2. Vascular anatomy as above with note made of a partially visualized arteriovenous fistula graft as well as extensive abdominal venous collaterals which terminate at the level of the left great saphenous vein and left femoral vein. Evaluation for patency of the abdominal vessels, including the graft, is limited on noncontrast examination. 3. Additional chronic findings as above.   I personally reviewed the images/study and I agree with the resident findings as stated by Babs Guillory MD. This study was interpreted at [facility], [place], [place].   MACRO: None   Signed by: Doe Alfonso 5/16/2025 5:32 PM Dictation workstation:   [id number]    CT abdomen pelvis wo IV contrast  Result Date: 11/18/2022  IMPRESSION: No acute abnormality. Changes of advanced chronic renal disease with numerous renal cysts. Mild colonic diverticulosis without evidence diverticulitis. Transcribed Using Voice Recognition Transcribe Date/Time: Nov 18 2022 11:40P Dictated by: WAYLON CONNELLY MD This examination was interpreted and the report reviewed and electronically signed by: WAYLON CONNELLY MD on Nov 18 2022 11:44PM   EST    CT abdomen pelvis wo IV contrast  Result Date: 10/7/2020  IMPRESSION: Distal descending colonic uncomplicated diverticulitis. Transcribed Using Voice Recognition Transcribe Date/Time: Oct  7 2020  8:52P Dictated by: NICHOLAS WEBSTER MD This examination was interpreted and the report reviewed and electronically signed by: NICHOLAS WEBSTER MD on Oct  7 2020  9:10PM  EST       Colonoscopy   Impression:              - Moderate diverticulosis in the sigmoid colon                          and in the ascending colon. There was no evidence                          of diverticular bleeding.                          - Non-bleeding external hemorrhoids.                          - No specimens collected.  Recommendation:          - Repeat colonoscopy in 7 years   2030   Pap 02/09/2024 reflex to HPV  Negative for intraepithelial lesion or malignancy   ????   Mammogram  BI mammo bilateral screening  Order: [id number]  Impression    IMPRESSION: INCOMPLETE: NEEDS ADDITIONAL IMAGING EVALUATION  The focal asymmetry in the right breast posterior depth lateral region  seen on the craniocaudal view only is indeterminate.  A spot compression  view is recommended.  The focal asymmetry in the left breast posterior depth central to the  nipple seen on the craniocaudal view only is indeterminate.  A spot  compression view is recommended.      Keiko Zelaya M.D.  ne/pentrevin:4/4/2024 11:17:31    Imaging Technologist(s): RT Taj(R)(M), [facility]  letter sent: Additional Imaging Needed  Mammogram BI-RADS: 0 Incomplete: needs additional imaging evaluation  NEEDS FOLLOW UP - SEE IF PT DID THIS   Other:             Last podiatry note I could locate:    05/06/2025     CC: Pain (foot) (Gangrene follow up on right foot)      SUBJECTIVE:  Rody Espinosa is a 54 year old female who presents for evaluation for follow up of right TMA dehiscence.  Since her last appointment she has been weightbearing as tolerated in her normal shoe  gear  She Has last dressing applied clean dry and intact.  Denying minimal pain to the foot at this time.  Denies any constitutional symptoms at this time.         OBJECTIVE:  GENERAL: No weight loss, malaise or fevers.  HEENT: Negative for frequent or significant headaches,   RESPIRATORY: Negative for cough, wheezing or shortness of breath.  CARDIOVASCULAR: Negative for chest pain, leg swelling or palpitations.  GI: Negative for abdominal discomfort,     MUSCULOSKELETAL: no back pain  The previous dehiscence site is now coapted, with healthy superficial tissue and mild hyperkeratotic rim. There is no drainage noted today. No extending erythema or edema, No cardinal signs of infection.   SKIN: Negative for lesions, rash, and itching. There is dorsal excoriation to the midfoot. No signs of infection noted.  NEURO:Negative - for numbess, tingling or burning in feet. Positive for phantom limb pain     ASSESSMENT:   Wound dehiscence right foot, now healed     PLAN:  No follow-ups on file.  ASSESSMENT/PLAN:  1. Gangrene of right foot (HCC) - ICD9: 785.4, ICD10: I96 (primary diagnosis)     2. Wound dehiscence - ICD9: 998.30, ICD10: T81.30XA     -Instructed patient that she can start to shower the area, apply lotion to the dorsal skin gently exfoliate as needed.  -Order placed for new custom diabetic shoes, diabetic filler for both feet  -Hyperkeratotic tissue was debrided from the dorsal incision.  -Educated on signs and symptoms to watch for and instructed to call if any should arise.     Follow-up in 2 weeks     Renea Church DPM PGY3          Dr. Holman Jan 2024    Chief Complaint: Patient presents for kidney transplant annual update     History of Present Illness:  Rody Espinosa  is a 53 y.o. female presents for her yearly update.     This is a 53 year old AA female with past medical history significant for hypertension, CAD/MI s/p RCA stent 2006, Diabetes after transplant, HTN, SVC compression syndrome MIGUEL ANGEL  recurrent DVT's on coumadin and End Stage Renal Disease secondary to HSP. She underwent a  donor renal transplant on 2007. The kidney allograft had lasted for 7 years and it failed in . Also has hx of RUE lymphedema from sirolimus. Rody is currently doing home hemodialysis, using Right thigh AVF, 4 days per week. Anuric. No potential living donor at this time.       Has been having vision loss. 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 Pending further interventions in right. Taken off coumadin for recurrent bleeding in eye. Continuing on ASA. Prior clots assisiated with her SVC syndrome. Denies lower extremity DVT     She is 100% PRA.     Last seen with cardiology 2023.      She has right lower extremity AVG and right subclavian/brachiocephalic stent.  Large collaterals along left side in subcutaneous tissue. Prior kidney transplant on the right side.     Adult cath 23:   1. Mild CAD with patent RCA stent in a left-dominant system.     Nuclear Stress 22:   1.  Normal myocardial perfusion study without evidence of inducible  ischemia or prior infarction. Findings similar to prior study.  2. The left ventricle is normal in size.  3. Normal LV wall motion with an LV EF estimated at greater than 65%.     Hemodialysis: 4 days a week at home via    ROS: anuric since her last transplant failed (15 years)      Past Medical History:  HTN  CAD s/p stents   Hx SVC syndrome, DVT associated with SVC syn     Past Surgical History:  RLE AVG  Renal transplant , failed   Lap hysterectomy     Review of Systems:  Cardiac: Denies chest pain, palpitations  : Normal urine output. Denies history of gross hematuria, nephrolithiasis, urinary retention, or recurrent UTIs.  Vascular: Denies personal or familial history of DVT/PE. No active claudication or non-healing LE wounds.  Functional Status: Can walk up 2 flights of stairs     Prior transplant: yes     Physical Exam:  Gen: A+OX3; NAD  HEENT: MADI  sclera anicteric, MMM  Cardiac: RRR  Chest: Normal inspiratory effort  Abdomen: S/NT/ND.  Ext: No LE edema  Vascular: 2+ palpable femoral pulses left, RLE graft  Psychiatric: Normal mood, affect     Assessment/Plan:  - Needs to see dermatology for right back chronic skin changes  - At time of surgery plan for midline incision to preserve left abdominal venous collaterals which are draining her LUE   - ECHO pending   - Optho follow up           [1]   Past Medical History:  Diagnosis Date    Allergic purpura     HSP (Henoch-Schonlein purpura) nephritis    Benign neoplasm of connective and other soft tissue, unspecified     Fibroid tumor    Compression of vein 11/16/2022    SVC syndrome    End stage renal disease (Multi)     ESRD (end stage renal disease)    Other conditions influencing health status     Acute Myocardial Infarction    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[facility])     Streptococcal meningitis   [2]   Past Surgical History:  Procedure Laterality Date    HYSTEROSCOPY  05/15/2013    Hysteroscopy With Endometrial Ablation    OTHER SURGICAL HISTORY  05/15/2013    Renal Transplant    OTHER SURGICAL HISTORY  05/15/2013    Parathyroid Resection Sub-Total Parathyroidectomy    OTHER SURGICAL HISTORY  05/15/2013    Parathyroid Surgery    OTHER SURGICAL HISTORY  05/15/2013    Repair Of Brow Ptosis Right Upper Eyelid    OTHER SURGICAL HISTORY  05/15/2013    Cardiac Cath Lesion 1, 1st Adjunct Treat Device: Stent    OTHER SURGICAL HISTORY  05/15/2013    Biopsy Renal    OTHER SURGICAL HISTORY  10/25/2018    Hemodialysis Access Type Arteriovenous Graft Right Thigh    OTHER SURGICAL HISTORY  10/25/2018    Hemodialysis Access Type Arteriovenous Fistula Left Arm    OTHER SURGICAL HISTORY  10/22/2014    Percutaneous Transluminal Angioplasty (Non-Coronary)

## 2025-08-05 ENCOUNTER — APPOINTMENT (OUTPATIENT)
Dept: CARDIOLOGY | Facility: HOSPITAL | Age: 55
End: 2025-08-05
Payer: COMMERCIAL

## 2025-08-05 ENCOUNTER — APPOINTMENT (OUTPATIENT)
Facility: HOSPITAL | Age: 55
End: 2025-08-05
Payer: COMMERCIAL

## 2025-08-05 ENCOUNTER — TELEPHONE (OUTPATIENT)
Facility: HOSPITAL | Age: 55
End: 2025-08-05
Payer: COMMERCIAL

## 2025-08-06 ENCOUNTER — APPOINTMENT (OUTPATIENT)
Facility: HOSPITAL | Age: 55
End: 2025-08-06
Payer: COMMERCIAL

## 2025-08-06 ENCOUNTER — TELEPHONE (OUTPATIENT)
Facility: HOSPITAL | Age: 55
End: 2025-08-06
Payer: COMMERCIAL

## 2025-08-08 PROCEDURE — 86808 CYTOTOXIC ANTIBODY SCREENING: CPT | Mod: OUT | Performed by: SURGERY

## 2025-08-13 ENCOUNTER — LAB REQUISITION (OUTPATIENT)
Dept: LAB | Facility: CLINIC | Age: 55
End: 2025-08-13
Payer: COMMERCIAL

## 2025-08-13 DIAGNOSIS — N18.6 END STAGE RENAL DISEASE (MULTI): ICD-10-CM

## 2025-08-13 LAB — FREEZE CROSSMATCH: NORMAL

## 2025-09-02 ENCOUNTER — OFFICE VISIT (OUTPATIENT)
Facility: HOSPITAL | Age: 55
End: 2025-09-02
Payer: COMMERCIAL

## 2025-09-02 ENCOUNTER — HOSPITAL ENCOUNTER (OUTPATIENT)
Dept: CARDIOLOGY | Facility: HOSPITAL | Age: 55
Discharge: HOME | End: 2025-09-02
Payer: COMMERCIAL

## 2025-09-02 ENCOUNTER — APPOINTMENT (OUTPATIENT)
Dept: RADIOLOGY | Facility: HOSPITAL | Age: 55
End: 2025-09-02
Payer: COMMERCIAL

## 2025-09-02 ENCOUNTER — CLINICAL SUPPORT (OUTPATIENT)
Dept: EMERGENCY MEDICINE | Facility: HOSPITAL | Age: 55
End: 2025-09-02
Payer: COMMERCIAL

## 2025-09-02 ENCOUNTER — HOSPITAL ENCOUNTER (EMERGENCY)
Facility: HOSPITAL | Age: 55
Discharge: HOME | End: 2025-09-02
Attending: EMERGENCY MEDICINE
Payer: COMMERCIAL

## 2025-09-02 ENCOUNTER — DOCUMENTATION (OUTPATIENT)
Facility: HOSPITAL | Age: 55
End: 2025-09-02
Payer: COMMERCIAL

## 2025-09-02 VITALS
SYSTOLIC BLOOD PRESSURE: 118 MMHG | OXYGEN SATURATION: 99 % | DIASTOLIC BLOOD PRESSURE: 80 MMHG | RESPIRATION RATE: 16 BRPM | HEART RATE: 82 BPM

## 2025-09-02 VITALS
WEIGHT: 183.8 LBS | DIASTOLIC BLOOD PRESSURE: 80 MMHG | BODY MASS INDEX: 29.22 KG/M2 | SYSTOLIC BLOOD PRESSURE: 144 MMHG | OXYGEN SATURATION: 98 % | HEART RATE: 79 BPM | TEMPERATURE: 97.1 F

## 2025-09-02 DIAGNOSIS — Z01.818 PRE-TRANSPLANT EVALUATION FOR KIDNEY TRANSPLANT: ICD-10-CM

## 2025-09-02 DIAGNOSIS — S09.90XA HEAD INJURY, INITIAL ENCOUNTER: ICD-10-CM

## 2025-09-02 DIAGNOSIS — N18.6 ESRD (END STAGE RENAL DISEASE) (MULTI): Primary | ICD-10-CM

## 2025-09-02 DIAGNOSIS — N18.6 ESRD (END STAGE RENAL DISEASE) (MULTI): ICD-10-CM
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
## 2025-09-02 DIAGNOSIS — E11.69 TYPE 2 DIABETES MELLITUS WITH OTHER SPECIFIED COMPLICATION, UNSPECIFIED WHETHER LONG TERM INSULIN USE (MULTI): ICD-10-CM

## 2025-09-02 DIAGNOSIS — Z94.0 KIDNEY REPLACED BY TRANSPLANT (HHS-HCC): ICD-10-CM

## 2025-09-02 LAB
ALBUMIN SERPL BCP-MCNC: 4.3 G/DL (ref 3.4–5)
ALP SERPL-CCNC: 106 U/L (ref 33–110)
ALT SERPL W P-5'-P-CCNC: 13 U/L (ref 7–45)
AMYLASE SERPL-CCNC: 71 U/L (ref 29–103)
AST SERPL W P-5'-P-CCNC: 12 U/L (ref 9–39)
BILIRUB DIRECT SERPL-MCNC: 0.2 MG/DL (ref 0–0.3)
BILIRUB SERPL-MCNC: 0.9 MG/DL (ref 0–1.2)
BUN SERPL-MCNC: 38 MG/DL (ref 6–23)
C PEPTIDE SERPL-MCNC: 11.3 NG/ML (ref 0.7–3.9)
CHOLEST SERPL-MCNC: 108 MG/DL (ref 0–199)
CHOLESTEROL/HDL RATIO: 2
CMV IGG AVIDITY SERPL IA-RTO: REACTIVE %
CREAT SERPL-MCNC: 10.51 MG/DL (ref 0.5–1.05)
EBV EA IGG SER QL: POSITIVE
EBV NA AB SER QL: POSITIVE
EBV VCA IGG SER IA-ACNC: POSITIVE
EBV VCA IGM SER IA-ACNC: NEGATIVE
EGFRCR SERPLBLD CKD-EPI 2021: 4 ML/MIN/1.73M*2
ERYTHROCYTE [DISTWIDTH] IN BLOOD BY AUTOMATED COUNT: 14.1 % (ref 11.5–14.5)
EST. AVERAGE GLUCOSE BLD GHB EST-MCNC: 111 MG/DL
HBA1C MFR BLD: 5.5 % (ref ?–5.7)
HBV CORE AB SER QL: NONREACTIVE
HBV SURFACE AB SER-ACNC: 19.5 MIU/ML
HBV SURFACE AG SERPL QL IA: NONREACTIVE
HCT VFR BLD AUTO: 37.3 % (ref 36–46)
HCV AB SER QL: NONREACTIVE
HCYS SERPL-SCNC: 27.97 UMOL/L (ref 5–13.9)
HDLC SERPL-MCNC: 54.9 MG/DL
HGB BLD-MCNC: 13.1 G/DL (ref 12–16)
HIV 1+2 AB+HIV1 P24 AG SERPL QL IA: NONREACTIVE
INR PPP: 1 (ref 0.9–1.1)
MCH RBC QN AUTO: 31.2 PG (ref 26–34)
MCHC RBC AUTO-ENTMCNC: 35.1 G/DL (ref 32–36)
MCV RBC AUTO: 89 FL (ref 80–100)
NON-HDL CHOLESTEROL: 53 MG/DL (ref 0–149)
NRBC BLD-RTO: 0.3 /100 WBCS (ref 0–0)
PHOSPHATE SERPL-MCNC: 4.7 MG/DL (ref 2.5–4.9)
PLATELET # BLD AUTO: 129 X10*3/UL (ref 150–450)
PROT SERPL-MCNC: 7.9 G/DL (ref 6.4–8.2)
PROTHROMBIN TIME: 10.5 SECONDS (ref 9.8–12.4)
RBC # BLD AUTO: 4.2 X10*6/UL (ref 4–5.2)
TREPONEMA PALLIDUM IGG+IGM AB [PRESENCE] IN SERUM OR PLASMA BY IMMUNOASSAY: NONREACTIVE
VARICELLA ZOSTER IGG INDEX: 3.9 AI
VZV IGG SER QL IA: POSITIVE
WBC # BLD AUTO: 7.2 X10*3/UL (ref 4.4–11.3)

## 2025-09-02 PROCEDURE — 80307 DRUG TEST PRSMV CHEM ANLYZR: CPT | Performed by: STUDENT IN AN ORGANIZED HEALTH CARE EDUCATION/TRAINING PROGRAM

## 2025-09-02 PROCEDURE — 86481 TB AG RESPONSE T-CELL SUSP: CPT | Performed by: STUDENT IN AN ORGANIZED HEALTH CARE EDUCATION/TRAINING PROGRAM

## 2025-09-02 PROCEDURE — 72125 CT NECK SPINE W/O DYE: CPT

## 2025-09-02 PROCEDURE — 84100 ASSAY OF PHOSPHORUS: CPT | Performed by: STUDENT IN AN ORGANIZED HEALTH CARE EDUCATION/TRAINING PROGRAM

## 2025-09-02 PROCEDURE — 70450 CT HEAD/BRAIN W/O DYE: CPT

## 2025-09-02 PROCEDURE — 83036 HEMOGLOBIN GLYCOSYLATED A1C: CPT | Performed by: STUDENT IN AN ORGANIZED HEALTH CARE EDUCATION/TRAINING PROGRAM

## 2025-09-02 PROCEDURE — 86832 HLA CLASS I HIGH DEFIN QUAL: CPT | Mod: OUT | Performed by: STUDENT IN AN ORGANIZED HEALTH CARE EDUCATION/TRAINING PROGRAM

## 2025-09-02 PROCEDURE — 86706 HEP B SURFACE ANTIBODY: CPT | Performed by: STUDENT IN AN ORGANIZED HEALTH CARE EDUCATION/TRAINING PROGRAM

## 2025-09-02 PROCEDURE — 84681 ASSAY OF C-PEPTIDE: CPT | Performed by: STUDENT IN AN ORGANIZED HEALTH CARE EDUCATION/TRAINING PROGRAM

## 2025-09-02 PROCEDURE — 70486 CT MAXILLOFACIAL W/O DYE: CPT

## 2025-09-02 PROCEDURE — 73030 X-RAY EXAM OF SHOULDER: CPT | Mod: RT

## 2025-09-02 PROCEDURE — 3044F HG A1C LEVEL LT 7.0%: CPT | Performed by: TRANSPLANT SURGERY

## 2025-09-02 PROCEDURE — 86787 VARICELLA-ZOSTER ANTIBODY: CPT | Performed by: STUDENT IN AN ORGANIZED HEALTH CARE EDUCATION/TRAINING PROGRAM

## 2025-09-02 PROCEDURE — 85610 PROTHROMBIN TIME: CPT | Performed by: STUDENT IN AN ORGANIZED HEALTH CARE EDUCATION/TRAINING PROGRAM

## 2025-09-02 PROCEDURE — 84520 ASSAY OF UREA NITROGEN: CPT | Performed by: STUDENT IN AN ORGANIZED HEALTH CARE EDUCATION/TRAINING PROGRAM

## 2025-09-02 PROCEDURE — 87340 HEPATITIS B SURFACE AG IA: CPT | Performed by: STUDENT IN AN ORGANIZED HEALTH CARE EDUCATION/TRAINING PROGRAM

## 2025-09-02 PROCEDURE — 86704 HEP B CORE ANTIBODY TOTAL: CPT | Performed by: STUDENT IN AN ORGANIZED HEALTH CARE EDUCATION/TRAINING PROGRAM

## 2025-09-02 PROCEDURE — 99215 OFFICE O/P EST HI 40 MIN: CPT | Mod: 25 | Performed by: TRANSPLANT SURGERY

## 2025-09-02 PROCEDURE — 93005 ELECTROCARDIOGRAM TRACING: CPT

## 2025-09-02 PROCEDURE — 82565 ASSAY OF CREATININE: CPT | Performed by: STUDENT IN AN ORGANIZED HEALTH CARE EDUCATION/TRAINING PROGRAM

## 2025-09-02 PROCEDURE — 76377 3D RENDER W/INTRP POSTPROCES: CPT

## 2025-09-02 PROCEDURE — 73030 X-RAY EXAM OF SHOULDER: CPT | Mod: LEFT SIDE | Performed by: RADIOLOGY

## 2025-09-02 PROCEDURE — 99215 OFFICE O/P EST HI 40 MIN: CPT | Performed by: TRANSPLANT SURGERY

## 2025-09-02 PROCEDURE — 93306 TTE W/DOPPLER COMPLETE: CPT

## 2025-09-02 PROCEDURE — 86664 EPSTEIN-BARR NUCLEAR ANTIGEN: CPT | Performed by: STUDENT IN AN ORGANIZED HEALTH CARE EDUCATION/TRAINING PROGRAM

## 2025-09-02 PROCEDURE — C8929 TTE W OR WO FOL WCON,DOPPLER: HCPCS

## 2025-09-02 PROCEDURE — 86644 CMV ANTIBODY: CPT | Performed by: STUDENT IN AN ORGANIZED HEALTH CARE EDUCATION/TRAINING PROGRAM

## 2025-09-02 PROCEDURE — 83090 ASSAY OF HOMOCYSTEINE: CPT | Performed by: STUDENT IN AN ORGANIZED HEALTH CARE EDUCATION/TRAINING PROGRAM

## 2025-09-02 PROCEDURE — 87389 HIV-1 AG W/HIV-1&-2 AB AG IA: CPT | Performed by: STUDENT IN AN ORGANIZED HEALTH CARE EDUCATION/TRAINING PROGRAM

## 2025-09-02 PROCEDURE — 70486 CT MAXILLOFACIAL W/O DYE: CPT | Performed by: STUDENT IN AN ORGANIZED HEALTH CARE EDUCATION/TRAINING PROGRAM

## 2025-09-02 PROCEDURE — 2500000004 HC RX 250 GENERAL PHARMACY W/ HCPCS (ALT 636 FOR OP/ED): Performed by: STUDENT IN AN ORGANIZED HEALTH CARE EDUCATION/TRAINING PROGRAM

## 2025-09-02 PROCEDURE — 73110 X-RAY EXAM OF WRIST: CPT | Mod: RT

## 2025-09-02 PROCEDURE — 3079F DIAST BP 80-89 MM HG: CPT | Performed by: TRANSPLANT SURGERY

## 2025-09-02 PROCEDURE — 85027 COMPLETE CBC AUTOMATED: CPT | Performed by: STUDENT IN AN ORGANIZED HEALTH CARE EDUCATION/TRAINING PROGRAM

## 2025-09-02 PROCEDURE — 80076 HEPATIC FUNCTION PANEL: CPT | Performed by: STUDENT IN AN ORGANIZED HEALTH CARE EDUCATION/TRAINING PROGRAM

## 2025-09-02 PROCEDURE — 70450 CT HEAD/BRAIN W/O DYE: CPT | Performed by: STUDENT IN AN ORGANIZED HEALTH CARE EDUCATION/TRAINING PROGRAM

## 2025-09-02 PROCEDURE — 83718 ASSAY OF LIPOPROTEIN: CPT | Performed by: STUDENT IN AN ORGANIZED HEALTH CARE EDUCATION/TRAINING PROGRAM

## 2025-09-02 PROCEDURE — 73110 X-RAY EXAM OF WRIST: CPT | Mod: LEFT SIDE | Performed by: RADIOLOGY

## 2025-09-02 PROCEDURE — 86803 HEPATITIS C AB TEST: CPT | Performed by: STUDENT IN AN ORGANIZED HEALTH CARE EDUCATION/TRAINING PROGRAM

## 2025-09-02 PROCEDURE — 86825 HLA X-MATH NON-CYTOTOXIC: CPT | Mod: OUT | Performed by: STUDENT IN AN ORGANIZED HEALTH CARE EDUCATION/TRAINING PROGRAM

## 2025-09-02 PROCEDURE — 72125 CT NECK SPINE W/O DYE: CPT | Performed by: STUDENT IN AN ORGANIZED HEALTH CARE EDUCATION/TRAINING PROGRAM

## 2025-09-02 PROCEDURE — 99285 EMERGENCY DEPT VISIT HI MDM: CPT | Mod: 25 | Performed by: EMERGENCY MEDICINE

## 2025-09-02 PROCEDURE — 2500000001 HC RX 250 WO HCPCS SELF ADMINISTERED DRUGS (ALT 637 FOR MEDICARE OP)

## 2025-09-02 PROCEDURE — 82150 ASSAY OF AMYLASE: CPT | Performed by: STUDENT IN AN ORGANIZED HEALTH CARE EDUCATION/TRAINING PROGRAM

## 2025-09-02 PROCEDURE — 3077F SYST BP >= 140 MM HG: CPT | Performed by: TRANSPLANT SURGERY

## 2025-09-02 PROCEDURE — 80323 ALKALOIDS NOS: CPT | Performed by: STUDENT IN AN ORGANIZED HEALTH CARE EDUCATION/TRAINING PROGRAM

## 2025-09-02 PROCEDURE — 86780 TREPONEMA PALLIDUM: CPT | Performed by: STUDENT IN AN ORGANIZED HEALTH CARE EDUCATION/TRAINING PROGRAM

## 2025-09-02 PROCEDURE — 73030 X-RAY EXAM OF SHOULDER: CPT | Mod: LT

## 2025-09-02 RX ORDER — ACETAMINOPHEN 325 MG/1
650 TABLET ORAL ONCE
Status: COMPLETED | OUTPATIENT
Start: 2025-09-02 | End: 2025-09-02

## 2025-09-02 RX ADMIN — ACETAMINOPHEN 650 MG: 325 TABLET, FILM COATED ORAL at 16:57

## 2025-09-02 RX ADMIN — HUMAN ALBUMIN MICROSPHERES AND PERFLUTREN 2 ML: 10; .22 INJECTION, SOLUTION INTRAVENOUS at 11:27

## 2025-09-02 ASSESSMENT — ENCOUNTER SYMPTOMS
HALLUCINATIONS: 0
ABDOMINAL PAIN: 0
FEVER: 0
COUGH: 0
LIGHT-HEADEDNESS: 0
AGITATION: 0
CONSTIPATION: 0
HEMATURIA: 0
DIZZINESS: 0
ABDOMINAL DISTENTION: 0
CHILLS: 0
ADENOPATHY: 0
ARTHRALGIAS: 0
SHORTNESS OF BREATH: 0
DYSURIA: 0
FREQUENCY: 0
WEAKNESS: 0
CONFUSION: 0
COLOR CHANGE: 0
DIARRHEA: 0
EYES NEGATIVE: 1

## 2025-09-02 ASSESSMENT — VISUAL ACUITY: OU: 1

## 2025-09-02 ASSESSMENT — PAIN SCALES - GENERAL: PAINLEVEL_OUTOF10: 0-NO PAIN

## 2025-09-03 LAB
AORTIC VALVE MEAN GRADIENT: 2 MMHG
AORTIC VALVE PEAK VELOCITY: 1.01 M/S
AV PEAK GRADIENT: 4 MMHG
AVA (PEAK VEL): 2.74 CM2
AVA (VTI): 2.85 CM2
EJECTION FRACTION APICAL 4 CHAMBER: 56.1
EJECTION FRACTION: 58 %
LEFT ATRIUM VOLUME AREA LENGTH INDEX BSA: 21.8 ML/M2
LEFT VENTRICLE INTERNAL DIMENSION DIASTOLE: 4.41 CM (ref 3.5–6)
LEFT VENTRICULAR OUTFLOW TRACT DIAMETER: 2.04 CM
MITRAL VALVE E/A RATIO: 0.82
RIGHT VENTRICLE FREE WALL PEAK S': 10 CM/S
RIGHT VENTRICLE PEAK SYSTOLIC PRESSURE: 33 MMHG
TRICUSPID ANNULAR PLANE SYSTOLIC EXCURSION: 2.1 CM

## 2025-09-04 LAB
AMPHETAMINES SERPL QL SCN: NEGATIVE
ANNOTATION COMMENT IMP: NORMAL
ATRIAL RATE: 81 BPM
BARBITURATES SERPL QL SCN: NEGATIVE
BENZODIAZ SERPL QL SCN: NEGATIVE
BUPRENORPHINE SERPL-MCNC: NEGATIVE NG/ML
CANNABINOIDS SERPL QL SCN: NEGATIVE
COCAINE SERPL QL SCN: NEGATIVE
METHADONE SERPL QL SCN: NEGATIVE
METHAMPHET SERPL QL: NEGATIVE
NIL(NEG) CONTROL SPOT COUNT: NORMAL
OPIATES SERPL QL SCN: NEGATIVE
OXYCODONE SERPL QL: NEGATIVE
P AXIS: 78 DEGREES
P OFFSET: 188 MS
P ONSET: 132 MS
PANEL A SPOT COUNT: 2
PANEL B SPOT COUNT: 0
PCP SERPL QL SCN: NEGATIVE
POS CONTROL SPOT COUNT: NORMAL
PR INTERVAL: 172 MS
Q ONSET: 218 MS
QRS COUNT: 13 BEATS
QRS DURATION: 82 MS
QT INTERVAL: 376 MS
QTC CALCULATION(BAZETT): 436 MS
QTC FREDERICIA: 415 MS
R AXIS: 63 DEGREES
T AXIS: 79 DEGREES
T OFFSET: 406 MS
T-SPOT. TB INTERPRETATION: NEGATIVE
VENTRICULAR RATE: 81 BPM

## 2025-09-05 ENCOUNTER — LAB REQUISITION (OUTPATIENT)
Dept: LAB | Facility: CLINIC | Age: 55
End: 2025-09-05
Payer: COMMERCIAL

## 2025-09-05 DIAGNOSIS — N18.6 END STAGE RENAL DISEASE (MULTI): ICD-10-CM

## 2025-09-05 LAB
FLOW AUTOCROSSMATCH: NORMAL
FLOW AUTOCROSSMATCH: NORMAL
HLA RESULTS: NORMAL
HLA-A+B+C AB NFR SER: NORMAL %
HLA-A+B+C AB NFR SER: NORMAL %
HLA-DP+DQ+DR AB NFR SER: NORMAL %
HLA-DP+DQ+DR AB NFR SER: NORMAL %

## 2025-09-06 LAB
COTININE SERPL-MCNC: <5 NG/ML
NICOTINE SERPL-MCNC: <5 NG/ML